# Patient Record
Sex: MALE | Race: WHITE | URBAN - METROPOLITAN AREA
[De-identification: names, ages, dates, MRNs, and addresses within clinical notes are randomized per-mention and may not be internally consistent; named-entity substitution may affect disease eponyms.]

---

## 2017-03-07 ENCOUNTER — TELEPHONE (OUTPATIENT)
Dept: FAMILY MEDICINE CLINIC | Facility: CLINIC | Age: 72
End: 2017-03-07

## 2017-03-07 DIAGNOSIS — G47.33 OSA (OBSTRUCTIVE SLEEP APNEA): Primary | ICD-10-CM

## 2017-03-07 RX ORDER — OMEGA-3S/DHA/EPA/FISH OIL 300-1000MG
1 CAPSULE ORAL 2 TIMES DAILY
COMMUNITY
Start: 2015-02-23

## 2017-03-07 RX ORDER — UBIDECARENONE 60 MG
1 CAPSULE ORAL DAILY
COMMUNITY
Start: 2009-02-26

## 2017-03-07 RX ORDER — OLMESARTAN MEDOXOMIL AND HYDROCHLOROTHIAZIDE 40/12.5 40; 12.5 MG/1; MG/1
1 TABLET ORAL DAILY
COMMUNITY
Start: 2008-03-13 | End: 2017-03-21

## 2017-03-07 RX ORDER — ATORVASTATIN CALCIUM 20 MG/1
1 TABLET, FILM COATED ORAL DAILY
COMMUNITY
Start: 2006-11-24 | End: 2017-03-21

## 2017-03-07 RX ORDER — CLONIDINE HYDROCHLORIDE 0.1 MG/1
1 TABLET ORAL 3 TIMES DAILY
Refills: 0 | COMMUNITY
Start: 2016-12-27 | End: 2018-02-13

## 2017-03-07 RX ORDER — DOCUSATE SODIUM 100 MG/1
1 CAPSULE, LIQUID FILLED ORAL DAILY
COMMUNITY
Start: 2014-03-27 | End: 2020-02-03

## 2017-03-07 RX ORDER — ALLOPURINOL 300 MG/1
1 TABLET ORAL DAILY
COMMUNITY
Start: 2013-02-04 | End: 2017-03-21

## 2017-03-07 RX ORDER — CLOBETASOL PROPIONATE 0.46 MG/ML
SOLUTION TOPICAL
Refills: 0 | COMMUNITY
Start: 2017-01-17

## 2017-03-07 RX ORDER — ACETAMINOPHEN 160 MG
6000 TABLET,DISINTEGRATING ORAL DAILY
COMMUNITY
Start: 2015-01-16

## 2017-03-07 RX ORDER — ALPHA LIPOIC ACID 200 MG
1000 CAPSULE ORAL DAILY
COMMUNITY
Start: 2015-01-16

## 2017-03-07 RX ORDER — CLONIDINE HYDROCHLORIDE 0.1 MG/1
1 TABLET ORAL 3 TIMES DAILY
COMMUNITY
Start: 2006-11-24 | End: 2017-03-21

## 2017-03-07 RX ORDER — MULTIVITAMIN
1 CAPSULE ORAL DAILY
COMMUNITY
Start: 2006-11-24

## 2017-03-07 NOTE — TELEPHONE ENCOUNTER
10/18/16 last seen with Dr. Cipriano Crow for sleep follow up  Okay to fax order to [facility] for cpap supplies?     Selvin Zuniga, 03/07/2017, 4:25 PM

## 2017-03-10 NOTE — TELEPHONE ENCOUNTER
All documentation faxed to [facility], [facility] Minor, 03/10/2017, 4:39 PM

## 2017-03-23 RX ORDER — ALLOPURINOL 300 MG/1
TABLET ORAL
Qty: 90 TABLET | Refills: 0 | Status: SHIPPED | OUTPATIENT
Start: 2017-03-23 | End: 2017-06-22

## 2017-03-23 RX ORDER — ATORVASTATIN CALCIUM 20 MG/1
TABLET, FILM COATED ORAL
Qty: 90 TABLET | Refills: 0 | Status: SHIPPED | OUTPATIENT
Start: 2017-03-23 | End: 2017-06-22

## 2017-03-23 RX ORDER — OLMESARTAN MEDOXOMIL AND HYDROCHLOROTHIAZIDE 40/12.5 40; 12.5 MG/1; MG/1
TABLET ORAL
Qty: 90 TABLET | Refills: 0 | Status: SHIPPED | OUTPATIENT
Start: 2017-03-23 | End: 2017-06-22

## 2017-03-23 RX ORDER — CLONIDINE HYDROCHLORIDE 0.1 MG/1
TABLET ORAL
Qty: 270 TABLET | Refills: 0 | Status: SHIPPED | OUTPATIENT
Start: 2017-03-23 | End: 2017-06-22

## 2017-06-22 RX ORDER — OLMESARTAN MEDOXOMIL AND HYDROCHLOROTHIAZIDE 40/12.5 40; 12.5 MG/1; MG/1
TABLET ORAL
Qty: 90 TABLET | Refills: 0 | Status: SHIPPED | OUTPATIENT
Start: 2017-06-22 | End: 2017-09-25

## 2017-06-22 RX ORDER — ATORVASTATIN CALCIUM 20 MG/1
TABLET, FILM COATED ORAL
Qty: 90 TABLET | Refills: 0 | Status: SHIPPED | OUTPATIENT
Start: 2017-06-22 | End: 2017-09-25

## 2017-06-22 RX ORDER — CLONIDINE HYDROCHLORIDE 0.1 MG/1
TABLET ORAL
Qty: 270 TABLET | Refills: 0 | Status: SHIPPED | OUTPATIENT
Start: 2017-06-22 | End: 2017-09-25

## 2017-06-22 RX ORDER — ALLOPURINOL 300 MG/1
TABLET ORAL
Qty: 90 TABLET | Refills: 0 | Status: SHIPPED | OUTPATIENT
Start: 2017-06-22 | End: 2017-09-25

## 2017-06-22 NOTE — TELEPHONE ENCOUNTER
10/18/16 last OV with Dr. Fernandez Aguayo  1/19/17 last labs done      [facility] Minor, 06/22/2017, 2:31 PM

## 2017-07-12 ENCOUNTER — TELEPHONE (OUTPATIENT)
Dept: FAMILY MEDICINE CLINIC | Facility: CLINIC | Age: 72
End: 2017-07-12

## 2017-07-12 DIAGNOSIS — C61 MALIGNANT NEOPLASM OF PROSTATE (HCC): ICD-10-CM

## 2017-07-12 DIAGNOSIS — R97.20 ELEVATED PROSTATE SPECIFIC ANTIGEN (PSA): Primary | ICD-10-CM

## 2017-07-12 NOTE — TELEPHONE ENCOUNTER
Patient saw Urologist at [facility]. Dr Amrik Alcocer requesting Patient to have repeat PSA. Requesting Order to be entered by Dr Erik Faith. Dr Amrik Alcocer did not send order home with Patient.     He will call back Thursday afternoon to Schedule Lab Bernad[place]

## 2017-07-13 PROBLEM — G47.30 SLEEP APNEA: Status: ACTIVE | Noted: 2017-07-13

## 2017-07-13 PROBLEM — M19.90 OSTEOARTHROSIS: Status: ACTIVE | Noted: 2017-07-13

## 2017-07-13 PROBLEM — Z86.010 HISTORY OF COLONIC POLYPS: Status: ACTIVE | Noted: 2017-07-13

## 2017-07-13 PROBLEM — Z86.0100 HISTORY OF COLONIC POLYPS: Status: ACTIVE | Noted: 2017-07-13

## 2017-07-13 PROBLEM — I10 ESSENTIAL HYPERTENSION: Status: ACTIVE | Noted: 2017-07-13

## 2017-07-13 PROBLEM — E78.00 PURE HYPERCHOLESTEROLEMIA: Status: ACTIVE | Noted: 2017-07-13

## 2017-07-13 PROBLEM — E66.9 OBESITY: Status: ACTIVE | Noted: 2017-07-13

## 2017-07-18 ENCOUNTER — APPOINTMENT (OUTPATIENT)
Dept: LAB | Age: 72
End: 2017-07-18
Attending: FAMILY MEDICINE
Payer: MEDICARE

## 2017-07-18 ENCOUNTER — TELEPHONE (OUTPATIENT)
Dept: FAMILY MEDICINE CLINIC | Facility: CLINIC | Age: 72
End: 2017-07-18

## 2017-07-18 DIAGNOSIS — C61 MALIGNANT NEOPLASM OF PROSTATE (HCC): ICD-10-CM

## 2017-07-18 DIAGNOSIS — R97.20 ELEVATED PROSTATE SPECIFIC ANTIGEN (PSA): ICD-10-CM

## 2017-07-18 LAB — PSA SERPL-MCNC: 3.01 NG/ML (ref 0.01–4)

## 2017-07-18 PROCEDURE — 36415 COLL VENOUS BLD VENIPUNCTURE: CPT

## 2017-07-18 PROCEDURE — 84153 ASSAY OF PSA TOTAL: CPT

## 2017-07-18 NOTE — TELEPHONE ENCOUNTER
----- Message from Luis David MD sent at 7/18/2017  4:18 PM CDT -----  PSA has doubled. Recommend fax to urologist.   Recommend appointment with oncologist as well.

## 2017-07-18 NOTE — TELEPHONE ENCOUNTER
Patient notified of results and recommendations and expressed understanding.   Copy of PSA faxed to Dr. Oli Vora at [facility]  Copy also to  for patient     Tere Zuniga, 07/18/17, 4:56 PM

## 2017-09-25 NOTE — TELEPHONE ENCOUNTER
Future appt:    Last Appointment:  Visit date not found  No results found for: CHOLEST, HDL, LDL, TRIGLY, TRIG  No results found for: EAG, A1C  No results found for: T4F, TSH, TSHT4  No results found for: GLU, BUN, BUNCREA, CREATSERUM, ANIONGAP, GFR, GFRNA

## 2017-09-29 ENCOUNTER — TELEPHONE (OUTPATIENT)
Dept: FAMILY MEDICINE CLINIC | Facility: CLINIC | Age: 72
End: 2017-09-29

## 2017-09-29 DIAGNOSIS — E53.8 ADENOSYLCOBALAMIN SYNTHESIS DEFECT: ICD-10-CM

## 2017-09-29 DIAGNOSIS — C61 MALIGNANT NEOPLASM OF PROSTATE (HCC): ICD-10-CM

## 2017-09-29 DIAGNOSIS — E78.00 PURE HYPERCHOLESTEROLEMIA: Primary | ICD-10-CM

## 2017-09-29 DIAGNOSIS — Z12.5 ENCOUNTER FOR SCREENING FOR MALIGNANT NEOPLASM OF PROSTATE: ICD-10-CM

## 2017-09-29 DIAGNOSIS — E55.9 VITAMIN D DEFICIENCY: ICD-10-CM

## 2017-09-29 DIAGNOSIS — G60.9 HEREDITARY AND IDIOPATHIC PERIPHERAL NEUROPATHY: ICD-10-CM

## 2017-09-29 DIAGNOSIS — R97.20 ELEVATED PROSTATE SPECIFIC ANTIGEN (PSA): ICD-10-CM

## 2017-09-29 DIAGNOSIS — R79.9 ABNORMAL FINDING OF BLOOD CHEMISTRY: ICD-10-CM

## 2017-09-29 RX ORDER — ALLOPURINOL 300 MG/1
TABLET ORAL
Qty: 90 TABLET | Refills: 0 | Status: SHIPPED | OUTPATIENT
Start: 2017-09-29 | End: 2017-12-20

## 2017-09-29 RX ORDER — ATORVASTATIN CALCIUM 20 MG/1
TABLET, FILM COATED ORAL
Qty: 90 TABLET | Refills: 0 | Status: SHIPPED | OUTPATIENT
Start: 2017-09-29 | End: 2017-12-20

## 2017-09-29 RX ORDER — OLMESARTAN MEDOXOMIL AND HYDROCHLOROTHIAZIDE 40/12.5 40; 12.5 MG/1; MG/1
TABLET ORAL
Qty: 90 TABLET | Refills: 0 | Status: SHIPPED | OUTPATIENT
Start: 2017-09-29 | End: 2017-12-20

## 2017-09-29 RX ORDER — CLONIDINE HYDROCHLORIDE 0.1 MG/1
TABLET ORAL
Qty: 270 TABLET | Refills: 0 | Status: SHIPPED | OUTPATIENT
Start: 2017-09-29 | End: 2017-10-17

## 2017-09-29 NOTE — TELEPHONE ENCOUNTER
Future Appointments  Date Time Provider Leeann Collinsi   10/17/2017 11:30 AM Miguel Suarez MD [facility] [place]     Patient is completely out of medications.

## 2017-09-29 NOTE — TELEPHONE ENCOUNTER
Patient would like to have labs done before seeing you at next appt. Future Appointments  Date Time Provider Leeann Flor   10/17/2017 11:30 AM Mihaela Cruz MD [facility] [place]     Can you please advise. Thank you.

## 2017-10-13 ENCOUNTER — LABORATORY ENCOUNTER (OUTPATIENT)
Dept: LAB | Age: 72
End: 2017-10-13
Attending: FAMILY MEDICINE
Payer: MEDICARE

## 2017-10-13 DIAGNOSIS — C61 MALIGNANT NEOPLASM OF PROSTATE (HCC): ICD-10-CM

## 2017-10-13 DIAGNOSIS — G60.9 HEREDITARY AND IDIOPATHIC PERIPHERAL NEUROPATHY: ICD-10-CM

## 2017-10-13 DIAGNOSIS — E53.8 ADENOSYLCOBALAMIN SYNTHESIS DEFECT: ICD-10-CM

## 2017-10-13 DIAGNOSIS — Z12.5 ENCOUNTER FOR SCREENING FOR MALIGNANT NEOPLASM OF PROSTATE: ICD-10-CM

## 2017-10-13 DIAGNOSIS — R79.9 ABNORMAL FINDING OF BLOOD CHEMISTRY: ICD-10-CM

## 2017-10-13 DIAGNOSIS — E55.9 VITAMIN D DEFICIENCY: ICD-10-CM

## 2017-10-13 DIAGNOSIS — E78.00 PURE HYPERCHOLESTEROLEMIA: ICD-10-CM

## 2017-10-13 DIAGNOSIS — R97.20 ELEVATED PSA: ICD-10-CM

## 2017-10-13 PROCEDURE — 36415 COLL VENOUS BLD VENIPUNCTURE: CPT

## 2017-10-13 PROCEDURE — 82553 CREATINE MB FRACTION: CPT

## 2017-10-13 PROCEDURE — 80053 COMPREHEN METABOLIC PANEL: CPT

## 2017-10-13 PROCEDURE — 81003 URINALYSIS AUTO W/O SCOPE: CPT

## 2017-10-13 PROCEDURE — 82728 ASSAY OF FERRITIN: CPT

## 2017-10-13 PROCEDURE — 84443 ASSAY THYROID STIM HORMONE: CPT

## 2017-10-13 PROCEDURE — 83036 HEMOGLOBIN GLYCOSYLATED A1C: CPT

## 2017-10-13 PROCEDURE — 85025 COMPLETE CBC W/AUTO DIFF WBC: CPT

## 2017-10-13 PROCEDURE — 84550 ASSAY OF BLOOD/URIC ACID: CPT

## 2017-10-13 PROCEDURE — 84153 ASSAY OF PSA TOTAL: CPT

## 2017-10-13 PROCEDURE — 82306 VITAMIN D 25 HYDROXY: CPT

## 2017-10-13 PROCEDURE — 83540 ASSAY OF IRON: CPT

## 2017-10-13 PROCEDURE — 82607 VITAMIN B-12: CPT

## 2017-10-13 PROCEDURE — 80061 LIPID PANEL: CPT

## 2017-10-13 PROCEDURE — 82550 ASSAY OF CK (CPK): CPT

## 2017-10-13 PROCEDURE — 83550 IRON BINDING TEST: CPT

## 2017-10-16 ENCOUNTER — TELEPHONE (OUTPATIENT)
Dept: FAMILY MEDICINE CLINIC | Facility: CLINIC | Age: 72
End: 2017-10-16

## 2017-10-16 NOTE — TELEPHONE ENCOUNTER
Patient notified of results and recommendations and expressed understanding.     Your appointments     Date & Time Appointment Department [facility])    Oct 17, 2017 11:30 AM CDT Follow up with Manisha Santamaria MD [address] [place] [address] (Ed

## 2017-10-16 NOTE — TELEPHONE ENCOUNTER
----- Message from Demetri Carranza MD sent at 10/16/2017  9:33 AM CDT -----  A1c is slightly elevated continue to watch diet. CK is elevated as patient having musculoskeletal pain?   Triglycerides are elevated with low HDLs and fish oil twice daily

## 2017-10-17 ENCOUNTER — OFFICE VISIT (OUTPATIENT)
Dept: FAMILY MEDICINE CLINIC | Facility: CLINIC | Age: 72
End: 2017-10-17

## 2017-10-17 ENCOUNTER — HOSPITAL ENCOUNTER (OUTPATIENT)
Dept: GENERAL RADIOLOGY | Age: 72
Discharge: HOME | End: 2017-10-17
Attending: FAMILY MEDICINE
Payer: MEDICARE

## 2017-10-17 VITALS
SYSTOLIC BLOOD PRESSURE: 138 MMHG | HEIGHT: 72 IN | HEART RATE: 72 BPM | BODY MASS INDEX: 38.38 KG/M2 | WEIGHT: 283.38 LBS | DIASTOLIC BLOOD PRESSURE: 70 MMHG | TEMPERATURE: 98 F | RESPIRATION RATE: 18 BRPM

## 2017-10-17 DIAGNOSIS — E55.9 VITAMIN D DEFICIENCY: ICD-10-CM

## 2017-10-17 DIAGNOSIS — C61 MALIGNANT NEOPLASM OF PROSTATE (HCC): ICD-10-CM

## 2017-10-17 DIAGNOSIS — E78.00 PURE HYPERCHOLESTEROLEMIA: ICD-10-CM

## 2017-10-17 DIAGNOSIS — M54.6 ACUTE MIDLINE THORACIC BACK PAIN: ICD-10-CM

## 2017-10-17 DIAGNOSIS — I10 ESSENTIAL HYPERTENSION: Primary | ICD-10-CM

## 2017-10-17 PROCEDURE — 99214 OFFICE O/P EST MOD 30 MIN: CPT | Performed by: FAMILY MEDICINE

## 2017-10-17 PROCEDURE — 90653 IIV ADJUVANT VACCINE IM: CPT | Performed by: FAMILY MEDICINE

## 2017-10-17 PROCEDURE — 72072 X-RAY EXAM THORAC SPINE 3VWS: CPT | Performed by: FAMILY MEDICINE

## 2017-10-17 PROCEDURE — G0008 ADMIN INFLUENZA VIRUS VAC: HCPCS | Performed by: FAMILY MEDICINE

## 2017-10-17 NOTE — PROGRESS NOTES
[facility] [place]  PROGRESS NOTE        HPI:   This is a 67year old male coming in for Patient presents with:  Medication Follow-Up: med check and go over lab results  Imm/Inj: flu shot    HPI  Here for follow-up of his chronic medical problem 8.7 mg/dL   -URINALYSIS WITH CULTURE REFLEX   Result Value Ref Range   Urine Color Yellow Yellow   Clarity Urine Clear Clear   Spec Gravity 1.016 1.001 - 1.030   Glucose Urine Negative Negative mg/dl   Bilirubin Urine Negative Negative   Ketones Urine Nega 0.00 - 1.00 x10(3) uL   Neutrophil % 57.0 %   Lymphocyte % 26.0 %   Monocyte % 8.4 %   Eosinophil % 7.6 %   Basophil % 0.8 %   Immature Granulocyte % 0.2 %       Past Medical History:   Diagnosis Date   • Essential hypertension      Past Surgical History: Not Answered         Problem List:  Patient Active Problem List:     First degree atrioventricular block     Benign enlargement of prostate     Eczema     Elevated prostate specific antigen (PSA)     Hemorrhoids     History of colonic polyps     Pure hyper and time. He has normal reflexes. Skin: Skin is warm and dry. ASSESSMENT AND PLAN:   Ron Calero was seen today for medication follow-up and imm/inj. Diagnoses and all orders for this visit:    Essential hypertension  Well-controlled.   Continue presen 11/04/2014      Pneumovax 23          12/01/2010      TDAP                  03/02/2009

## 2017-11-20 ENCOUNTER — TELEPHONE (OUTPATIENT)
Dept: FAMILY MEDICINE CLINIC | Facility: CLINIC | Age: 72
End: 2017-11-20

## 2017-11-20 DIAGNOSIS — C61 ADENOCARCINOMA OF PROSTATE (HCC): ICD-10-CM

## 2017-11-20 DIAGNOSIS — R73.9 HYPERGLYCEMIA: ICD-10-CM

## 2017-11-20 DIAGNOSIS — R89.9 ABNORMAL LABORATORY TEST: ICD-10-CM

## 2017-11-20 DIAGNOSIS — R97.20 ELEVATED PROSTATE SPECIFIC ANTIGEN (PSA): Primary | ICD-10-CM

## 2017-11-20 NOTE — TELEPHONE ENCOUNTER
Patient has upcoming physical and lab appt. Needs orders entered.  Please advise     Your appointments     Date & Time Appointment Department [facility])    Jan 17, 2018  9:15 AM CST Laboratory Visit with REF [facility] ([facility] [place]

## 2017-12-20 RX ORDER — ATORVASTATIN CALCIUM 20 MG/1
TABLET, FILM COATED ORAL
Qty: 90 TABLET | Refills: 0 | Status: SHIPPED | OUTPATIENT
Start: 2017-12-20 | End: 2018-03-19

## 2017-12-20 RX ORDER — ALLOPURINOL 300 MG/1
TABLET ORAL
Qty: 90 TABLET | Refills: 0 | Status: SHIPPED | OUTPATIENT
Start: 2017-12-20 | End: 2018-03-19

## 2017-12-20 RX ORDER — CLONIDINE HYDROCHLORIDE 0.1 MG/1
TABLET ORAL
Qty: 270 TABLET | Refills: 0 | Status: SHIPPED | OUTPATIENT
Start: 2017-12-20 | End: 2018-03-19

## 2017-12-20 RX ORDER — OLMESARTAN MEDOXOMIL AND HYDROCHLOROTHIAZIDE 40/12.5 40; 12.5 MG/1; MG/1
TABLET ORAL
Qty: 90 TABLET | Refills: 0 | Status: SHIPPED | OUTPATIENT
Start: 2017-12-20 | End: 2018-03-19

## 2017-12-20 NOTE — TELEPHONE ENCOUNTER
Future appt:     Your appointments     Date & Time Appointment Department [facility])    Jan 17, 2018  9:15 AM CST Laboratory Visit with REF [facility]  Reference Lab ([facility] [place])    Jan 18, 2018 11:00 AM CST Medicare Annual Well Visit with Mock,

## 2018-01-17 ENCOUNTER — LABORATORY ENCOUNTER (OUTPATIENT)
Dept: LAB | Age: 73
End: 2018-01-17
Attending: FAMILY MEDICINE
Payer: MEDICARE

## 2018-01-17 ENCOUNTER — TELEPHONE (OUTPATIENT)
Dept: FAMILY MEDICINE CLINIC | Facility: CLINIC | Age: 73
End: 2018-01-17

## 2018-01-17 DIAGNOSIS — R89.9 ABNORMAL LABORATORY TEST: ICD-10-CM

## 2018-01-17 DIAGNOSIS — C61 ADENOCARCINOMA OF PROSTATE (HCC): ICD-10-CM

## 2018-01-17 DIAGNOSIS — R73.9 HYPERGLYCEMIA: ICD-10-CM

## 2018-01-17 DIAGNOSIS — R97.20 ELEVATED PROSTATE SPECIFIC ANTIGEN (PSA): ICD-10-CM

## 2018-01-17 LAB
ALBUMIN SERPL-MCNC: 4 G/DL (ref 3.5–4.8)
ALP LIVER SERPL-CCNC: 90 U/L (ref 45–117)
ALT SERPL-CCNC: 42 U/L (ref 17–63)
AST SERPL-CCNC: 23 U/L (ref 15–41)
BASOPHILS # BLD AUTO: 0.05 X10(3) UL (ref 0–0.1)
BASOPHILS NFR BLD AUTO: 0.7 %
BILIRUB SERPL-MCNC: 0.8 MG/DL (ref 0.1–2)
BUN BLD-MCNC: 16 MG/DL (ref 8–20)
CALCIUM BLD-MCNC: 9.2 MG/DL (ref 8.3–10.3)
CHLORIDE: 108 MMOL/L (ref 101–111)
CK: 152 IU/L (ref 39–308)
CO2: 28 MMOL/L (ref 22–32)
COMPLEXED PSA SERPL-MCNC: 5.05 NG/ML (ref 0.01–4)
CREAT BLD-MCNC: 0.98 MG/DL (ref 0.7–1.3)
EOSINOPHIL # BLD AUTO: 0.41 X10(3) UL (ref 0–0.3)
EOSINOPHIL NFR BLD AUTO: 5.4 %
ERYTHROCYTE [DISTWIDTH] IN BLOOD BY AUTOMATED COUNT: 13.8 % (ref 11.5–16)
EST. AVERAGE GLUCOSE BLD GHB EST-MCNC: 131 MG/DL (ref 68–126)
GLUCOSE BLD-MCNC: 87 MG/DL (ref 70–99)
HBA1C MFR BLD HPLC: 6.2 % (ref ?–5.7)
HCT VFR BLD AUTO: 44.2 % (ref 37–53)
HGB BLD-MCNC: 14.6 G/DL (ref 13–17)
IMMATURE GRANULOCYTE COUNT: 0.03 X10(3) UL (ref 0–1)
IMMATURE GRANULOCYTE RATIO %: 0.4 %
LYMPHOCYTES # BLD AUTO: 2.12 X10(3) UL (ref 0.9–4)
LYMPHOCYTES NFR BLD AUTO: 27.7 %
M PROTEIN MFR SERPL ELPH: 7.4 G/DL (ref 6.1–8.3)
MCH RBC QN AUTO: 30.9 PG (ref 27–33.2)
MCHC RBC AUTO-ENTMCNC: 33 G/DL (ref 31–37)
MCV RBC AUTO: 93.4 FL (ref 80–99)
MONOCYTES # BLD AUTO: 0.65 X10(3) UL (ref 0.1–0.6)
MONOCYTES NFR BLD AUTO: 8.5 %
NEUTROPHIL ABS PRELIM: 4.39 X10 (3) UL (ref 1.3–6.7)
NEUTROPHILS # BLD AUTO: 4.39 X10(3) UL (ref 1.3–6.7)
NEUTROPHILS NFR BLD AUTO: 57.3 %
PLATELET # BLD AUTO: 200 10(3)UL (ref 150–450)
POTASSIUM SERPL-SCNC: 3.6 MMOL/L (ref 3.6–5.1)
RBC # BLD AUTO: 4.73 X10(6)UL (ref 3.8–5.8)
RED CELL DISTRIBUTION WIDTH-SD: 47.2 FL (ref 35.1–46.3)
SODIUM SERPL-SCNC: 143 MMOL/L (ref 136–144)
WBC # BLD AUTO: 7.7 X10(3) UL (ref 4–13)

## 2018-01-17 PROCEDURE — 80053 COMPREHEN METABOLIC PANEL: CPT

## 2018-01-17 PROCEDURE — 83036 HEMOGLOBIN GLYCOSYLATED A1C: CPT

## 2018-01-17 PROCEDURE — 82550 ASSAY OF CK (CPK): CPT

## 2018-01-17 PROCEDURE — 36415 COLL VENOUS BLD VENIPUNCTURE: CPT

## 2018-01-17 PROCEDURE — 85025 COMPLETE CBC W/AUTO DIFF WBC: CPT

## 2018-01-17 NOTE — TELEPHONE ENCOUNTER
patient is here for labs today and would like to access results on my chart when results are in- since dr lim is not in to sign off on these to put them through to Malia- will someone else be able to do this so that they are available to view online?- h

## 2018-01-17 NOTE — TELEPHONE ENCOUNTER
These let patient know that when I review the results I will be able to release him to my chart for him. Thank you.  Loreta Franco, 01/17/18, 9:29 AM

## 2018-01-23 ENCOUNTER — TELEPHONE (OUTPATIENT)
Dept: FAMILY MEDICINE CLINIC | Facility: CLINIC | Age: 73
End: 2018-01-23

## 2018-01-23 DIAGNOSIS — R97.20 ELEVATED PSA: Primary | ICD-10-CM

## 2018-01-23 DIAGNOSIS — C61 MALIGNANT NEOPLASM OF PROSTATE (HCC): ICD-10-CM

## 2018-01-23 DIAGNOSIS — Z92.3 PERSONAL HISTORY OF RADIATION EXPOSURE: ICD-10-CM

## 2018-01-23 NOTE — TELEPHONE ENCOUNTER
Per Nuc Med at [facility], just an order for bone scan is needed. Also needs to specify body part or whole body.

## 2018-01-23 NOTE — TELEPHONE ENCOUNTER
Orders entered. Please fax orders with the recent CMP to [facility] and let patient know. Thank you.    Loreta Franco, 01/23/18, 1:58 PM

## 2018-01-23 NOTE — TELEPHONE ENCOUNTER
Faxed orders and CMP to [facility] patient scheduling. Phoned patient and informed him of orders faxed. Gave patient [facility] patient scheduling phone number. Patient verbalized understanding and expressed appreciation. Thank you.  TRENT NORIEGA, 01/23/18, 2:23 P

## 2018-01-23 NOTE — TELEPHONE ENCOUNTER
Dr. Edgar Cummings (Oncologist in [place]) calling regarding patient. Dr. Martin Neville states he would like to have a bone scan and a CT of abdomin and pelvis because of an elevated PSA and previous radiation ordered for patient.  Dr. Martin Neville aware Dr. Lilian Aceves

## 2018-02-05 ENCOUNTER — PATIENT MESSAGE (OUTPATIENT)
Dept: FAMILY MEDICINE CLINIC | Facility: CLINIC | Age: 73
End: 2018-02-05

## 2018-02-05 NOTE — TELEPHONE ENCOUNTER
From: Harpreet Broussard  To: Lex Pop MD  Sent: 2/5/2018 2:59 PM CST  Subject: Test Results Question    Last week I had a bone scan and CT scan conducted at [facility]. I was told the test results would be put into My Chart test results file.  My

## 2018-02-07 ENCOUNTER — TELEPHONE (OUTPATIENT)
Dept: FAMILY MEDICINE CLINIC | Facility: CLINIC | Age: 73
End: 2018-02-07

## 2018-02-07 NOTE — TELEPHONE ENCOUNTER
Irma calling to make sure we received the addendum to patient's CT scan. States she faxed it over this morning. Elysia Freire checked the faxes while I was on the phone with Irma and we did receive the fax. Irma informed.   Will route message to [facility]

## 2018-02-08 ENCOUNTER — TELEPHONE (OUTPATIENT)
Dept: FAMILY MEDICINE CLINIC | Facility: CLINIC | Age: 73
End: 2018-02-08

## 2018-02-08 NOTE — TELEPHONE ENCOUNTER
Call to Dr. Harinder Aldrich regarding abnormal CT scan. Due to elevated PSA. he was with a patient.    Will call back

## 2018-02-08 NOTE — TELEPHONE ENCOUNTER
Spoke with Dr. Millie Hoang local urology appointment to further evaluate lesion on kidney and will need follow up with him, for possbie axemet scanning. Had a bone scan as well.    Wife is in [place] and goes out there the 19th of Febr

## 2018-02-10 ENCOUNTER — PATIENT MESSAGE (OUTPATIENT)
Dept: FAMILY MEDICINE CLINIC | Facility: CLINIC | Age: 73
End: 2018-02-10

## 2018-02-10 DIAGNOSIS — R97.20 ELEVATED PSA: ICD-10-CM

## 2018-02-10 DIAGNOSIS — C61 PROSTATE CA (HCC): ICD-10-CM

## 2018-02-10 DIAGNOSIS — N28.89 RENAL MASS: Primary | ICD-10-CM

## 2018-02-10 NOTE — TELEPHONE ENCOUNTER
From: Fede Dave  To: Manisha Santamaria MD  Sent: 2/10/2018 9:05 AM CST  Subject: Referral Request    The other day you asked if I have a preference about a urologist. I had forgotten that my daughter and grandson had to to Dr. Beth Brown in [place] and taryn

## 2018-02-12 ENCOUNTER — TELEPHONE (OUTPATIENT)
Dept: FAMILY MEDICINE CLINIC | Facility: CLINIC | Age: 73
End: 2018-02-12

## 2018-02-12 NOTE — TELEPHONE ENCOUNTER
Please f-ax copies of his ct, bone scan and demographic to Dr. Whelan Stagers office at  [phone]

## 2018-02-13 ENCOUNTER — OFFICE VISIT (OUTPATIENT)
Dept: FAMILY MEDICINE CLINIC | Facility: CLINIC | Age: 73
End: 2018-02-13

## 2018-02-13 VITALS
HEIGHT: 72 IN | DIASTOLIC BLOOD PRESSURE: 88 MMHG | WEIGHT: 282.81 LBS | HEART RATE: 76 BPM | BODY MASS INDEX: 38.31 KG/M2 | RESPIRATION RATE: 16 BRPM | TEMPERATURE: 98 F | SYSTOLIC BLOOD PRESSURE: 146 MMHG

## 2018-02-13 DIAGNOSIS — Z13.39 SCREENING FOR ALCOHOL PROBLEM: ICD-10-CM

## 2018-02-13 DIAGNOSIS — C61 MALIGNANT NEOPLASM OF PROSTATE (HCC): ICD-10-CM

## 2018-02-13 DIAGNOSIS — Z13.31 DEPRESSION SCREENING: ICD-10-CM

## 2018-02-13 DIAGNOSIS — I10 ESSENTIAL HYPERTENSION: ICD-10-CM

## 2018-02-13 DIAGNOSIS — E78.00 PURE HYPERCHOLESTEROLEMIA: ICD-10-CM

## 2018-02-13 DIAGNOSIS — Z00.00 ENCOUNTER FOR ANNUAL HEALTH EXAMINATION: Primary | ICD-10-CM

## 2018-02-13 DIAGNOSIS — E53.8 ADENOSYLCOBALAMIN SYNTHESIS DEFECT: ICD-10-CM

## 2018-02-13 PROCEDURE — G0442 ANNUAL ALCOHOL SCREEN 15 MIN: HCPCS | Performed by: FAMILY MEDICINE

## 2018-02-13 PROCEDURE — G0444 DEPRESSION SCREEN ANNUAL: HCPCS | Performed by: FAMILY MEDICINE

## 2018-02-13 PROCEDURE — G0439 PPPS, SUBSEQ VISIT: HCPCS | Performed by: FAMILY MEDICINE

## 2018-02-13 PROCEDURE — 93000 ELECTROCARDIOGRAM COMPLETE: CPT | Performed by: FAMILY MEDICINE

## 2018-02-13 RX ORDER — ALCLOMETASONE DIPROPIONATE 0.5 MG/G
OINTMENT TOPICAL
COMMUNITY
Start: 2018-01-04

## 2018-02-13 NOTE — PATIENT INSTRUCTIONS
Ranjeet Damon's SCREENING SCHEDULE   Tests on this list are recommended by your physician but may not be covered, or covered at this frequency, by your insurer. Please check with your insurance carrier before scheduling to verify coverage.     PREVENTATIV 10 years- more often if abnormal Colonoscopy,3 Years due on 04/08/2018 Update Health Maintenance if applicable    Flex Sigmoidoscopy Screen  Covered every 5 years No results found for this or any previous visit. No flowsheet data found.      Fecal Occult Bl cover unless Medically needed    Zoster (Not covered by Medicare Part B) No orders found for this or any previous visit. This may be covered with your pharmacy  prescription benefits     Recommended Websites for Advanced Directives    [email or web address]

## 2018-02-13 NOTE — PROGRESS NOTES
HPI:   Sarah De Guzman is a 67year old male who presents for a Medicare Subsequent Annual Wellness visit (Pt already had Initial Annual Wellness).     His last annual assessment has been over 1 year: Annual Physical due on 01/15/2016         Fall/Risk Asse CAGE Alcohol screening   Oskar Stroud was screened for Alcohol abuse and had a score of 0 so is at low risk.      Patient Care Team: Patient Care Team:  Dottie Diaz MD as PCP - General (Family Practice)    Patient Active Problem List:     First de by mouth daily. Vitamin D3 2000 units Oral Cap Take 3,000 Units by mouth daily. Coenzyme Q10 (CO Q 10) 60 MG Oral Cap Take 1 capsule by mouth 2 (two) times daily. docusate sodium (COLACE) 100 MG Oral Cap Take 1 capsule by mouth daily.    Multiple Tiffanie allergy or asthma    EXAM:   /88 (BP Location: Right arm, Patient Position: Sitting, Cuff Size: large)   Pulse 76   Temp 97.9 °F (36.6 °C) (Temporal)   Resp 16   Ht 72\"   Wt 282 lb 12.8 oz   BMI 38.35 kg/m²   Estimated body mass index is 38.35 kg/m² 23 12/01/2010   • TDAP 03/02/2009        ASSESSMENT AND OTHER RELEVANT CHRONIC CONDITIONS:   Valerie Turcios is a 67year old male who presents for a Medicare Assessment.      PLAN SUMMARY:   Diagnoses and all orders for this visit:    Encounter for annual he Value   01/17/2018 87   ----------       Cardiovascular Disease Screening     LDL Annually LDL Cholesterol (mg/dL)   Date Value   10/13/2017 64        EKG - w/ Initial Preventative Physical Exam only, or if medically necessary Electrocardiogram date    Col Procedure      Annual Monitoring of Persistent     Medications (ACE/ARB, digoxin diuretics, anticonvulsants.)    Potassium  Annually Potassium (mmol/L)   Date Value   01/17/2018 3.6    No flowsheet data found.     Creatinine  Annually Creatinine (mg/dL)   D

## 2018-03-09 ENCOUNTER — TELEPHONE (OUTPATIENT)
Dept: FAMILY MEDICINE CLINIC | Facility: CLINIC | Age: 73
End: 2018-03-09

## 2018-03-19 RX ORDER — CLONIDINE HYDROCHLORIDE 0.1 MG/1
TABLET ORAL
Qty: 270 TABLET | Refills: 1 | Status: SHIPPED | OUTPATIENT
Start: 2018-03-19 | End: 2018-09-21

## 2018-03-19 RX ORDER — ALLOPURINOL 300 MG/1
TABLET ORAL
Qty: 90 TABLET | Refills: 1 | Status: SHIPPED | OUTPATIENT
Start: 2018-03-19 | End: 2018-09-21

## 2018-03-19 RX ORDER — ATORVASTATIN CALCIUM 20 MG/1
TABLET, FILM COATED ORAL
Qty: 90 TABLET | Refills: 1 | Status: SHIPPED | OUTPATIENT
Start: 2018-03-19 | End: 2018-09-21

## 2018-03-19 RX ORDER — OLMESARTAN MEDOXOMIL AND HYDROCHLOROTHIAZIDE 40/12.5 40; 12.5 MG/1; MG/1
TABLET ORAL
Qty: 90 TABLET | Refills: 1 | Status: SHIPPED | OUTPATIENT
Start: 2018-03-19 | End: 2018-09-21

## 2018-03-19 NOTE — TELEPHONE ENCOUNTER
Future appt:     Your appointments     Date & Time Appointment Department [facility])    Jul 23, 2018  4:40 PM CDT Exam - Established Patient with Portia Gray MD [address] [place], [place] [address]        [place]

## 2018-08-31 ENCOUNTER — OFFICE VISIT (OUTPATIENT)
Dept: FAMILY MEDICINE CLINIC | Facility: CLINIC | Age: 73
End: 2018-08-31
Payer: MEDICARE

## 2018-08-31 VITALS
BODY MASS INDEX: 38.44 KG/M2 | TEMPERATURE: 99 F | RESPIRATION RATE: 20 BRPM | WEIGHT: 283.81 LBS | HEIGHT: 72 IN | HEART RATE: 78 BPM | DIASTOLIC BLOOD PRESSURE: 76 MMHG | SYSTOLIC BLOOD PRESSURE: 136 MMHG

## 2018-08-31 DIAGNOSIS — R73.09 ABNORMAL GLUCOSE: ICD-10-CM

## 2018-08-31 DIAGNOSIS — I44.0 FIRST DEGREE ATRIOVENTRICULAR BLOCK: ICD-10-CM

## 2018-08-31 DIAGNOSIS — C61 MALIGNANT NEOPLASM OF PROSTATE (HCC): ICD-10-CM

## 2018-08-31 DIAGNOSIS — I10 ESSENTIAL HYPERTENSION: ICD-10-CM

## 2018-08-31 DIAGNOSIS — E55.9 VITAMIN D DEFICIENCY: ICD-10-CM

## 2018-08-31 DIAGNOSIS — R01.1 MURMUR, CARDIAC: Primary | ICD-10-CM

## 2018-08-31 PROCEDURE — 99214 OFFICE O/P EST MOD 30 MIN: CPT | Performed by: FAMILY MEDICINE

## 2018-08-31 NOTE — PROGRESS NOTES
[facility] [place]  PROGRESS NOTE        HPI:   This is a 68year old male coming in for Patient presents with:  Medication Follow-Up    HPI  Just finished 39 radiation treatments for his prostrate cancer and doing well.   He  Notes that he has b RDW-SD 47.2 (H) 35.1 - 46.3 fL   Neutrophil Absolute Prelim 4.39 1.30 - 6.70 x10 (3) uL   Neutrophil Absolute 4.39 1.30 - 6.70 x10(3) uL   Lymphocyte Absolute 2.12 0.90 - 4.00 x10(3) uL   Monocyte Absolute 0.65 (H) 0.10 - 0.60 x10(3) uL   Eosinophil Abso Packs/day: 1.00      Years: 35.00        Types: Cigarettes, Pipe, Cigars     Quit date: 1/1/2000  Smokeless tobacco: Never Used                      Comment: 20yrs cigarettes, 9 yrs pipe, 6 years cigars            [place]  Alcohol use:  Yes by mouth daily. Disp:  Rfl:    Omega-3 Fatty Acids (OMEGA-3 FISH OIL) 300 MG Oral Cap Take 1 capsule by mouth 2 (two) times daily.  Disp:  Rfl:    Clobetasol Propionate 0.05 % External Solution AAA prn Disp:  Rfl: 0   aspirin 81 MG Oral Tab Take 81 mg by mo Constitutional: He is oriented to person, place, and time. He appears well-developed and well-nourished. HENT:   Head: Normocephalic and atraumatic.    Right Ear: External ear normal.   Left Ear: External ear normal.   Mouth/Throat: Oropharynx is clear continue present management plan to follow-up in 6 months and repeat these labs in 1-2 months. Malignant neoplasm of prostate ([facility] [address]  -     CBC WITH DIFFERENTIAL WITH PLATELET; Future  -     COMP METABOLIC PANEL (14);  Future  -     CK CREATINE KINASE (NOT 10/17/2017   • Meningococcal (Menactra/Menveo) 06/12/2002   • Pneumococcal (Prevnar 13) 11/04/2014   • Pneumovax 23 12/01/2010   • TDAP 03/02/2009   • Typhoid 04/15/1999, 06/12/2002   • Yellow Fever 04/15/1999   • Zoster Vaccine Live (Zostavax) 02/27/2009

## 2018-09-21 RX ORDER — CLONIDINE HYDROCHLORIDE 0.1 MG/1
TABLET ORAL
Qty: 270 TABLET | Refills: 1 | Status: SHIPPED | OUTPATIENT
Start: 2018-09-21 | End: 2019-03-13

## 2018-09-21 RX ORDER — ATORVASTATIN CALCIUM 20 MG/1
TABLET, FILM COATED ORAL
Qty: 90 TABLET | Refills: 1 | Status: SHIPPED | OUTPATIENT
Start: 2018-09-21 | End: 2019-03-13

## 2018-09-21 RX ORDER — ALLOPURINOL 300 MG/1
TABLET ORAL
Qty: 90 TABLET | Refills: 1 | Status: SHIPPED | OUTPATIENT
Start: 2018-09-21 | End: 2019-03-13

## 2018-09-21 RX ORDER — OLMESARTAN MEDOXOMIL AND HYDROCHLOROTHIAZIDE 40/12.5 40; 12.5 MG/1; MG/1
TABLET ORAL
Qty: 90 TABLET | Refills: 1 | Status: SHIPPED | OUTPATIENT
Start: 2018-09-21 | End: 2019-03-13

## 2018-09-21 NOTE — TELEPHONE ENCOUNTER
Future appt:     Your appointments     Date & Time Appointment Department [facility])    Oct 15, 2018  8:00 AM CDT Laboratory Visit with REF Tamika Jett [facility] ([facility])        [facility]  [facility] [place]  [address] [place] [address]

## 2018-09-28 ENCOUNTER — TELEPHONE (OUTPATIENT)
Dept: FAMILY MEDICINE CLINIC | Facility: CLINIC | Age: 73
End: 2018-09-28

## 2018-09-28 NOTE — TELEPHONE ENCOUNTER
Patient notified of results and recommendations and expressed understanding.     Bhavesh Cotto, 09/28/18, 5:58 PM

## 2018-09-28 NOTE — TELEPHONE ENCOUNTER
Called to tell patient about his echo result. Pt with very mild disease.  Ef of 70  aortic valve sclerosis,

## 2018-10-15 ENCOUNTER — LABORATORY ENCOUNTER (OUTPATIENT)
Dept: LAB | Age: 73
End: 2018-10-15
Attending: FAMILY MEDICINE
Payer: MEDICARE

## 2018-10-15 DIAGNOSIS — I10 ESSENTIAL HYPERTENSION: ICD-10-CM

## 2018-10-15 DIAGNOSIS — R73.09 ABNORMAL GLUCOSE: ICD-10-CM

## 2018-10-15 DIAGNOSIS — E55.9 VITAMIN D DEFICIENCY: ICD-10-CM

## 2018-10-15 DIAGNOSIS — C61 MALIGNANT NEOPLASM OF PROSTATE (HCC): ICD-10-CM

## 2018-10-15 PROCEDURE — 82306 VITAMIN D 25 HYDROXY: CPT

## 2018-10-15 PROCEDURE — 84153 ASSAY OF PSA TOTAL: CPT

## 2018-10-15 PROCEDURE — 85025 COMPLETE CBC W/AUTO DIFF WBC: CPT

## 2018-10-15 PROCEDURE — 84443 ASSAY THYROID STIM HORMONE: CPT

## 2018-10-15 PROCEDURE — 86038 ANTINUCLEAR ANTIBODIES: CPT

## 2018-10-15 PROCEDURE — 81003 URINALYSIS AUTO W/O SCOPE: CPT

## 2018-10-15 PROCEDURE — 82607 VITAMIN B-12: CPT

## 2018-10-15 PROCEDURE — 36415 COLL VENOUS BLD VENIPUNCTURE: CPT

## 2018-10-15 PROCEDURE — 82550 ASSAY OF CK (CPK): CPT

## 2018-10-15 PROCEDURE — 85652 RBC SED RATE AUTOMATED: CPT

## 2018-10-15 PROCEDURE — 84550 ASSAY OF BLOOD/URIC ACID: CPT

## 2018-10-15 PROCEDURE — 86200 CCP ANTIBODY: CPT

## 2018-10-15 PROCEDURE — 83036 HEMOGLOBIN GLYCOSYLATED A1C: CPT

## 2018-10-15 PROCEDURE — 86431 RHEUMATOID FACTOR QUANT: CPT

## 2018-10-15 PROCEDURE — 80061 LIPID PANEL: CPT

## 2018-10-15 PROCEDURE — 80053 COMPREHEN METABOLIC PANEL: CPT

## 2019-01-04 DIAGNOSIS — C61 MALIGNANT NEOPLASM OF PROSTATE (HCC): ICD-10-CM

## 2019-01-04 DIAGNOSIS — R97.20 ELEVATED PROSTATE SPECIFIC ANTIGEN (PSA): Primary | ICD-10-CM

## 2019-01-04 NOTE — PROGRESS NOTES
Order for PSA into chart per Dr. Umberto Sneed orders  See patient letter scanned into Epic dated 11/12/18    Amber Cherry, 01/04/19, 9:52 AM

## 2019-01-15 ENCOUNTER — TELEPHONE (OUTPATIENT)
Dept: FAMILY MEDICINE CLINIC | Facility: CLINIC | Age: 74
End: 2019-01-15

## 2019-01-24 ENCOUNTER — APPOINTMENT (OUTPATIENT)
Dept: LAB | Age: 74
End: 2019-01-24
Attending: FAMILY MEDICINE
Payer: MEDICARE

## 2019-01-24 DIAGNOSIS — C61 MALIGNANT NEOPLASM OF PROSTATE (HCC): ICD-10-CM

## 2019-01-24 DIAGNOSIS — R97.20 ELEVATED PROSTATE SPECIFIC ANTIGEN (PSA): ICD-10-CM

## 2019-01-24 LAB — PSA SERPL-MCNC: 0.01 NG/ML (ref 0.01–4)

## 2019-01-24 PROCEDURE — 36415 COLL VENOUS BLD VENIPUNCTURE: CPT

## 2019-01-24 PROCEDURE — 84153 ASSAY OF PSA TOTAL: CPT

## 2019-01-25 ENCOUNTER — TELEPHONE (OUTPATIENT)
Dept: FAMILY MEDICINE CLINIC | Facility: CLINIC | Age: 74
End: 2019-01-25

## 2019-01-25 NOTE — TELEPHONE ENCOUNTER
Patient informed of below. patient not seeing a urologist at this time. Patient will print results off [employer] and bring with to his oncology appt. patient extremely pleased with his labs and excited for dilia in march.

## 2019-01-25 NOTE — TELEPHONE ENCOUNTER
----- Message from Loli Iglesias MD sent at 1/24/2019  5:07 PM CST -----  Please send to urology. Looks good.

## 2019-02-11 ENCOUNTER — TELEPHONE (OUTPATIENT)
Dept: FAMILY MEDICINE CLINIC | Facility: CLINIC | Age: 74
End: 2019-02-11

## 2019-02-11 DIAGNOSIS — G47.33 OSA (OBSTRUCTIVE SLEEP APNEA): Primary | ICD-10-CM

## 2019-02-11 NOTE — TELEPHONE ENCOUNTER
Patient states his cpap machine is not working correctly   [employer] is going to replace his machine  States he needs an order faxed to [facility] so his cpap machine can be replaced    Valeria Hobbs, 02/11/19, 11:47 AM

## 2019-02-11 NOTE — TELEPHONE ENCOUNTER
Patient having problems with sleep machine, patient states that justin needs a prescription from Dr. Carleen Acevedo so they can replace patients machine. Any questions, call patient.

## 2019-03-04 ENCOUNTER — OFFICE VISIT (OUTPATIENT)
Dept: FAMILY MEDICINE CLINIC | Facility: CLINIC | Age: 74
End: 2019-03-04
Payer: MEDICARE

## 2019-03-04 VITALS
DIASTOLIC BLOOD PRESSURE: 84 MMHG | RESPIRATION RATE: 18 BRPM | OXYGEN SATURATION: 97 % | SYSTOLIC BLOOD PRESSURE: 136 MMHG | HEIGHT: 72 IN | BODY MASS INDEX: 39.91 KG/M2 | TEMPERATURE: 97 F | HEART RATE: 84 BPM | WEIGHT: 294.63 LBS

## 2019-03-04 DIAGNOSIS — M62.08 RECTUS DIASTASIS: ICD-10-CM

## 2019-03-04 DIAGNOSIS — M25.561 RIGHT KNEE PAIN, UNSPECIFIED CHRONICITY: ICD-10-CM

## 2019-03-04 DIAGNOSIS — Z00.00 ENCOUNTER FOR ANNUAL HEALTH EXAMINATION: ICD-10-CM

## 2019-03-04 DIAGNOSIS — Z00.00 ENCOUNTER FOR ROUTINE ADULT HEALTH EXAMINATION WITHOUT ABNORMAL FINDINGS: Primary | ICD-10-CM

## 2019-03-04 DIAGNOSIS — C61 PROSTATE CANCER (HCC): ICD-10-CM

## 2019-03-04 PROCEDURE — 99497 ADVNCD CARE PLAN 30 MIN: CPT | Performed by: FAMILY MEDICINE

## 2019-03-04 PROCEDURE — G0439 PPPS, SUBSEQ VISIT: HCPCS | Performed by: FAMILY MEDICINE

## 2019-03-04 NOTE — PATIENT INSTRUCTIONS
Refer to [facility] for colonoscopy  [phone]. Fe Damon's SCREENING SCHEDULE   Tests on this list are recommended by your physician but may not be covered, or covered at this frequency, by your insurer.  Please check with your insurance aguilar Covered up to Age 76     Colonoscopy Screen   Covered every 10 years- more often if abnormal Colonoscopy due on 04/08/2018 Update Health Maintenance if applicable    Flex Sigmoidoscopy Screen  Covered every 5 years No results found for this or any previous with your prescription benefits, but Medicare does not cover unless Medically needed    Zoster (Not covered by Medicare Part B) No orders found for this or any previous visit.  This may be covered with your pharmacy  prescription benefits     Recommended We

## 2019-03-04 NOTE — PROGRESS NOTES
HPI:   Marie Sahu is a 68year old male who presents for a Medicare Subsequent Annual Wellness visit (Pt already had Initial Annual Wellness).     Pt states he is concerned about his having difficulty with the hormone oncologist in [facility],  Having so the past but quit greater than 12 months ago.   Social History    Tobacco Use      Smoking status: Former Smoker        Packs/day: 1.00        Years: 35.00        Pack years: 35        Types: Cigarettes, Pipe, Cigars        Quit date: 1/1/2000        Years and latex.     CURRENT MEDICATIONS:     Outpatient Medications Marked as Taking for the 3/4/19 encounter (Office Visit) with Holden Nair MD:  ALLOPURINOL 300 MG Oral Tab TAKE ONE TABLET BY MOUTH ONCE DAILY   ATORVASTATIN 20 MG Oral Tab TAKE ONE TABLET BY drinks about 1.8 oz of alcohol per week. He reports that he does not use drugs.      REVIEW OF SYSTEMS:   Review of Systems      History obtained from the patient  EXAM:   /84 (BP Location: Left arm, Patient Position: Sitting, Cuff Size: large)   Puls Vaccination History     Immunization History   Administered Date(s) Administered   • FLUAD High Dose 72 yr and older (98064) 10/17/2017   • HEP A 04/15/1999, 10/13/1999   • HEP B 06/12/2002, 07/10/2002, 12/18/2002   • IPV 06/12/2002   • Influenza 10/17/2 data found.     Fasting Blood Sugar (FSB)Annually Glucose (mg/dL)   Date Value   10/15/2018 114 (H)          Cardiovascular Disease Screening     LDL Annually LDL Cholesterol (mg/dL)   Date Value   10/15/2018 70        EKG - w/ Initial Preventative Physical MONITORING Internal Lab or Procedure External Lab or Procedure      Annual Monitoring of Persistent     Medications (ACE/ARB, digoxin diuretics, anticonvulsants.)    Potassium  Annually Potassium (mmol/L)   Date Value   10/15/2018 4.0    No flowsheet data

## 2019-03-11 ENCOUNTER — TELEPHONE (OUTPATIENT)
Dept: FAMILY MEDICINE CLINIC | Facility: CLINIC | Age: 74
End: 2019-03-11

## 2019-03-13 RX ORDER — CLONIDINE HYDROCHLORIDE 0.1 MG/1
TABLET ORAL
Qty: 270 TABLET | Refills: 0 | Status: SHIPPED | OUTPATIENT
Start: 2019-03-13 | End: 2019-06-15

## 2019-03-13 RX ORDER — OLMESARTAN MEDOXOMIL AND HYDROCHLOROTHIAZIDE 40/12.5 40; 12.5 MG/1; MG/1
TABLET ORAL
Qty: 90 TABLET | Refills: 0 | Status: SHIPPED | OUTPATIENT
Start: 2019-03-13 | End: 2019-06-15

## 2019-03-13 RX ORDER — ALLOPURINOL 300 MG/1
TABLET ORAL
Qty: 90 TABLET | Refills: 0 | Status: SHIPPED | OUTPATIENT
Start: 2019-03-13 | End: 2019-06-08

## 2019-03-13 RX ORDER — ATORVASTATIN CALCIUM 20 MG/1
TABLET, FILM COATED ORAL
Qty: 90 TABLET | Refills: 0 | Status: SHIPPED | OUTPATIENT
Start: 2019-03-13 | End: 2019-06-15

## 2019-03-13 NOTE — TELEPHONE ENCOUNTER
Future appt:    Last Appointment:  3/4/2019  Cholesterol, Total (mg/dL)   Date Value   10/15/2018 142     HDL Cholesterol (mg/dL)   Date Value   10/15/2018 40     LDL Cholesterol (mg/dL)   Date Value   10/15/2018 70     Triglycerides (mg/dL)   Date Value

## 2019-03-16 ENCOUNTER — TELEPHONE (OUTPATIENT)
Dept: FAMILY MEDICINE CLINIC | Facility: CLINIC | Age: 74
End: 2019-03-16

## 2019-03-16 DIAGNOSIS — R79.89 ABNORMAL CBC: ICD-10-CM

## 2019-03-16 DIAGNOSIS — E78.00 PURE HYPERCHOLESTEROLEMIA: ICD-10-CM

## 2019-03-16 DIAGNOSIS — C61 MALIGNANT NEOPLASM OF PROSTATE (HCC): Primary | ICD-10-CM

## 2019-03-16 DIAGNOSIS — E55.9 VITAMIN D DEFICIENCY: ICD-10-CM

## 2019-03-16 DIAGNOSIS — E53.8 ADENOSYLCOBALAMIN SYNTHESIS DEFECT: ICD-10-CM

## 2019-03-16 DIAGNOSIS — R73.09 ELEVATED GLUCOSE: ICD-10-CM

## 2019-03-18 NOTE — TELEPHONE ENCOUNTER
Orders into chart for full fasting labs  Patient notified to come to his lab appointment fasting  Patient expressed understanding    Nati Marti, 03/18/19, 2:06 PM

## 2019-03-18 NOTE — TELEPHONE ENCOUNTER
Your Appointments    Monday April 22, 2019  8:45 AM CDT  Laboratory Visit with REF Gabriella M[facility]ugh [facility] ([facility] [place]) [address] [facility]  [phone]        Patient needs to have PSA done per Dr. Melina rOtega recommend

## 2019-04-05 ENCOUNTER — TELEPHONE (OUTPATIENT)
Dept: FAMILY MEDICINE CLINIC | Facility: CLINIC | Age: 74
End: 2019-04-05

## 2019-04-05 ENCOUNTER — NURSE ONLY (OUTPATIENT)
Dept: FAMILY MEDICINE CLINIC | Facility: CLINIC | Age: 74
End: 2019-04-05
Payer: MEDICARE

## 2019-04-05 VITALS — TEMPERATURE: 98 F

## 2019-04-05 DIAGNOSIS — Z23 IMMUNIZATION DUE: Primary | ICD-10-CM

## 2019-04-05 DIAGNOSIS — Z23 NEED FOR VACCINATION: ICD-10-CM

## 2019-04-05 PROCEDURE — 90714 TD VACC NO PRESV 7 YRS+ IM: CPT | Performed by: FAMILY MEDICINE

## 2019-04-05 PROCEDURE — 90471 IMMUNIZATION ADMIN: CPT | Performed by: FAMILY MEDICINE

## 2019-04-05 NOTE — TELEPHONE ENCOUNTER
order needed for tetnus shot, it was talked about at last appointment and has now cut himself last night was told if this happened he would need it within 3 days of an injury, please advise

## 2019-04-05 NOTE — TELEPHONE ENCOUNTER
Appt given.     Future Appointments   Date Time Provider Leeann Flor   4/5/2019  4:00 PM [facility] [place] NURSE [facility] [place]   4/22/2019  8:45 AM [facility]F [facility] REF [facility]

## 2019-04-05 NOTE — TELEPHONE ENCOUNTER
Pt was seen recently with Dr. Cipriano Crow- 3/4/19. Pt states at time of visit, he brought up question if he needs tetanus- states he knew he was due for one.   Pt states he was told to call if he has any injuries, pt states he was told he would need vaccination w

## 2019-04-22 ENCOUNTER — TELEPHONE (OUTPATIENT)
Dept: FAMILY MEDICINE CLINIC | Facility: CLINIC | Age: 74
End: 2019-04-22

## 2019-04-22 ENCOUNTER — LABORATORY ENCOUNTER (OUTPATIENT)
Dept: LAB | Age: 74
End: 2019-04-22
Attending: FAMILY MEDICINE
Payer: MEDICARE

## 2019-04-22 DIAGNOSIS — R73.09 ELEVATED GLUCOSE: ICD-10-CM

## 2019-04-22 DIAGNOSIS — R79.89 ABNORMAL CBC: ICD-10-CM

## 2019-04-22 DIAGNOSIS — E55.9 VITAMIN D DEFICIENCY: ICD-10-CM

## 2019-04-22 DIAGNOSIS — C61 MALIGNANT NEOPLASM OF PROSTATE (HCC): ICD-10-CM

## 2019-04-22 DIAGNOSIS — E78.00 PURE HYPERCHOLESTEROLEMIA: ICD-10-CM

## 2019-04-22 PROCEDURE — 80061 LIPID PANEL: CPT

## 2019-04-22 PROCEDURE — 81003 URINALYSIS AUTO W/O SCOPE: CPT

## 2019-04-22 PROCEDURE — 83036 HEMOGLOBIN GLYCOSYLATED A1C: CPT

## 2019-04-22 PROCEDURE — 83540 ASSAY OF IRON: CPT

## 2019-04-22 PROCEDURE — 36415 COLL VENOUS BLD VENIPUNCTURE: CPT

## 2019-04-22 PROCEDURE — 80053 COMPREHEN METABOLIC PANEL: CPT

## 2019-04-22 PROCEDURE — 84550 ASSAY OF BLOOD/URIC ACID: CPT

## 2019-04-22 PROCEDURE — 84153 ASSAY OF PSA TOTAL: CPT

## 2019-04-22 PROCEDURE — 83550 IRON BINDING TEST: CPT

## 2019-04-22 PROCEDURE — 85025 COMPLETE CBC W/AUTO DIFF WBC: CPT

## 2019-04-22 PROCEDURE — 82306 VITAMIN D 25 HYDROXY: CPT

## 2019-04-22 PROCEDURE — 82550 ASSAY OF CK (CPK): CPT

## 2019-04-22 PROCEDURE — 82728 ASSAY OF FERRITIN: CPT

## 2019-04-22 NOTE — TELEPHONE ENCOUNTER
----- Message from Sydnee Blanchard MD sent at 4/22/2019  5:38 PM CDT -----  Normal (urinalysis) results,   please notify patient.    [employer] message sent

## 2019-04-23 NOTE — TELEPHONE ENCOUNTER
Chart reviewed and pt has reviewed labs/results/recommendations.     Results faxed to Urology - Dr. Carlos Hong at [phone]

## 2019-04-23 NOTE — TELEPHONE ENCOUNTER
----- Message from Carolina Iglesias MD sent at 4/22/2019  6:54 PM CDT -----  Labs look good. AIc is slightly higher,  Recommend attention to diet and exercise. Continue vitamin D supplementation. Goal is 50. PSA is stable.  Fax to urology    Continue pre

## 2019-06-12 NOTE — TELEPHONE ENCOUNTER
Future appt:    Last Appointment:  3/4/2019  Cholesterol, Total (mg/dL)   Date Value   04/22/2019 139     HDL Cholesterol (mg/dL)   Date Value   04/22/2019 36 (L)     LDL Cholesterol (mg/dL)   Date Value   04/22/2019 70     Triglycerides (mg/dL)   Date Sofia

## 2019-06-15 RX ORDER — ALLOPURINOL 300 MG/1
TABLET ORAL
Qty: 90 TABLET | Refills: 0 | Status: SHIPPED | OUTPATIENT
Start: 2019-06-15 | End: 2019-09-10

## 2019-06-17 RX ORDER — ATORVASTATIN CALCIUM 20 MG/1
TABLET, FILM COATED ORAL
Qty: 90 TABLET | Refills: 0 | Status: SHIPPED | OUTPATIENT
Start: 2019-06-17 | End: 2019-09-10

## 2019-06-17 RX ORDER — OLMESARTAN MEDOXOMIL AND HYDROCHLOROTHIAZIDE 40/12.5 40; 12.5 MG/1; MG/1
TABLET ORAL
Qty: 90 TABLET | Refills: 0 | Status: SHIPPED | OUTPATIENT
Start: 2019-06-17 | End: 2019-09-10

## 2019-06-17 RX ORDER — CLONIDINE HYDROCHLORIDE 0.1 MG/1
TABLET ORAL
Qty: 270 TABLET | Refills: 0 | Status: SHIPPED | OUTPATIENT
Start: 2019-06-17 | End: 2019-09-10

## 2019-06-17 NOTE — TELEPHONE ENCOUNTER
Future appt:  No future appointments.   Last Appointment:  3/4/2019  Cholesterol, Total (mg/dL)   Date Value   04/22/2019 139     HDL Cholesterol (mg/dL)   Date Value   04/22/2019 36 (L)     LDL Cholesterol (mg/dL)   Date Value   04/22/2019 70     Triglycer

## 2019-07-15 ENCOUNTER — TELEPHONE (OUTPATIENT)
Dept: FAMILY MEDICINE CLINIC | Facility: CLINIC | Age: 74
End: 2019-07-15

## 2019-07-15 DIAGNOSIS — C61 MALIGNANT NEOPLASM OF PROSTATE (HCC): Primary | ICD-10-CM

## 2019-07-15 NOTE — TELEPHONE ENCOUNTER
Pt states that he has had an appointment with Dr. Valerie Sutton and he states that the notes were sent to Dr. Radha Caban for review.  Pt states that he needs the order for a PSA test to be done based on this appointment and the collaboration that Dr. Radha Caban and Dr. Samy Keith

## 2019-07-15 NOTE — TELEPHONE ENCOUNTER
Patient states he needs to have PSA lab work, states they are being order by a different Doctor. Patient wants to know if Dr Jose Alfredo Dooley wants him to come in for more lab work so he can get them done the same day.  Please call back

## 2019-07-16 NOTE — TELEPHONE ENCOUNTER
Pt contacted and informed that order for PSA is in chart. Pt stated will call office back to schedule lab draw.

## 2019-07-19 ENCOUNTER — APPOINTMENT (OUTPATIENT)
Dept: LAB | Age: 74
End: 2019-07-19
Attending: FAMILY MEDICINE
Payer: MEDICARE

## 2019-07-19 DIAGNOSIS — C61 MALIGNANT NEOPLASM OF PROSTATE (HCC): ICD-10-CM

## 2019-07-19 LAB — PSA SERPL-MCNC: 0.04 NG/ML (ref ?–4)

## 2019-07-19 PROCEDURE — 84153 ASSAY OF PSA TOTAL: CPT

## 2019-07-19 PROCEDURE — 36415 COLL VENOUS BLD VENIPUNCTURE: CPT

## 2019-07-20 ENCOUNTER — TELEPHONE (OUTPATIENT)
Dept: FAMILY MEDICINE CLINIC | Facility: CLINIC | Age: 74
End: 2019-07-20

## 2019-07-20 NOTE — TELEPHONE ENCOUNTER
Let pt know the following below. Pt verbalized his understanding and had no other questions at this time.    Faxed to Dr Natacha Reid

## 2019-07-20 NOTE — TELEPHONE ENCOUNTER
----- Message from Eriberto Jo MD sent at 7/20/2019  7:48 AM CDT -----  This is slightly higher, than it was previously,   Please forward this to urology.

## 2019-09-10 RX ORDER — OLMESARTAN MEDOXOMIL AND HYDROCHLOROTHIAZIDE 40/12.5 40; 12.5 MG/1; MG/1
TABLET ORAL
Qty: 90 TABLET | Refills: 0 | Status: SHIPPED | OUTPATIENT
Start: 2019-09-10 | End: 2020-03-03

## 2019-09-10 RX ORDER — ATORVASTATIN CALCIUM 20 MG/1
TABLET, FILM COATED ORAL
Qty: 90 TABLET | Refills: 0 | Status: SHIPPED | OUTPATIENT
Start: 2019-09-10 | End: 2019-12-05

## 2019-09-10 RX ORDER — ALLOPURINOL 300 MG/1
TABLET ORAL
Qty: 90 TABLET | Refills: 0 | Status: SHIPPED | OUTPATIENT
Start: 2019-09-10 | End: 2019-12-05

## 2019-09-10 RX ORDER — CLONIDINE HYDROCHLORIDE 0.1 MG/1
TABLET ORAL
Qty: 270 TABLET | Refills: 0 | Status: SHIPPED | OUTPATIENT
Start: 2019-09-10 | End: 2019-12-05

## 2019-09-10 NOTE — TELEPHONE ENCOUNTER
Future appt:    Last Appointment with provider:  3/4/19 (Annual Physical)  Last appointment at [facility] [place]:  Visit date not found  Cholesterol, Total (mg/dL)   Date Value   04/22/2019 139     HDL Cholesterol (mg/dL)   Date Value   04/22/2019 36 (L)     LD

## 2019-09-11 ENCOUNTER — TELEPHONE (OUTPATIENT)
Dept: FAMILY MEDICINE CLINIC | Facility: CLINIC | Age: 74
End: 2019-09-11

## 2019-09-11 NOTE — TELEPHONE ENCOUNTER
Please advise orders to split medication. Refill was sent yesterday for Olmesartan/HCTZ 40-12.5mg. Received a fax from [facility] [place] stating that the medication is on a nationwide back order.      They would like to know if they can split the medication

## 2019-10-14 ENCOUNTER — TELEPHONE (OUTPATIENT)
Dept: FAMILY MEDICINE CLINIC | Facility: CLINIC | Age: 74
End: 2019-10-14

## 2019-10-14 DIAGNOSIS — C61 MALIGNANT NEOPLASM OF PROSTATE (HCC): Primary | ICD-10-CM

## 2019-10-14 NOTE — TELEPHONE ENCOUNTER
Re:   lab order from other Dr Morgan Slider PSA    - Please leave detailed messaage on voice mail if cannot be reached.    needs to schedule this week

## 2019-10-14 NOTE — TELEPHONE ENCOUNTER
Pt calling to state that order for PSA lab is needed in regards to upcoming appt with Dr. Becka Feliciano on 10/21/19  Pt informed that order would be sent to Dr Molina Zavala for review   Lab appt scheduled.   Future Appointments   Date Time Provider Leeann Andujar

## 2019-10-15 ENCOUNTER — APPOINTMENT (OUTPATIENT)
Dept: LAB | Age: 74
End: 2019-10-15
Attending: FAMILY MEDICINE
Payer: MEDICARE

## 2019-10-15 DIAGNOSIS — C61 MALIGNANT NEOPLASM OF PROSTATE (HCC): ICD-10-CM

## 2019-10-15 PROCEDURE — 36415 COLL VENOUS BLD VENIPUNCTURE: CPT

## 2019-10-15 PROCEDURE — 84153 ASSAY OF PSA TOTAL: CPT

## 2019-10-17 ENCOUNTER — TELEPHONE (OUTPATIENT)
Dept: FAMILY MEDICINE CLINIC | Facility: CLINIC | Age: 74
End: 2019-10-17

## 2019-10-17 NOTE — TELEPHONE ENCOUNTER
Per chart pt has viewed results/recommendations. Lab results faxed to Dr. Deon Gonzalez at [facility] at fax # [phone].

## 2019-10-17 NOTE — TELEPHONE ENCOUNTER
----- Message from Marli Hou MD sent at 10/16/2019  9:12 PM CDT -----  slightly higher than previous. Fax to urology at [facility]. Notify pt. Mychart message sent.

## 2019-12-05 RX ORDER — ATORVASTATIN CALCIUM 20 MG/1
TABLET, FILM COATED ORAL
Qty: 90 TABLET | Refills: 0 | Status: SHIPPED | OUTPATIENT
Start: 2019-12-05 | End: 2020-03-05

## 2019-12-05 RX ORDER — CLONIDINE HYDROCHLORIDE 0.1 MG/1
TABLET ORAL
Qty: 270 TABLET | Refills: 0 | Status: SHIPPED | OUTPATIENT
Start: 2019-12-05 | End: 2020-02-03

## 2019-12-05 RX ORDER — ALLOPURINOL 300 MG/1
TABLET ORAL
Qty: 90 TABLET | Refills: 0 | Status: SHIPPED | OUTPATIENT
Start: 2019-12-05 | End: 2020-03-05

## 2019-12-05 NOTE — TELEPHONE ENCOUNTER
Future appt:    Last Appointment with provider:  3/4/2019    Last appointment at [facility]:  Visit date not found  Cholesterol, Total (mg/dL)   Date Value   04/22/2019 139     HDL Cholesterol (mg/dL)   Date Value   04/22/2019 36 (L)     LDL Cholesterol

## 2019-12-06 RX ORDER — ALLOPURINOL 300 MG/1
TABLET ORAL
Qty: 90 TABLET | Refills: 0 | OUTPATIENT
Start: 2019-12-06

## 2019-12-06 RX ORDER — ATORVASTATIN CALCIUM 20 MG/1
TABLET, FILM COATED ORAL
Qty: 90 TABLET | Refills: 0 | OUTPATIENT
Start: 2019-12-06

## 2019-12-06 RX ORDER — CLONIDINE HYDROCHLORIDE 0.1 MG/1
TABLET ORAL
Qty: 270 TABLET | Refills: 0 | OUTPATIENT
Start: 2019-12-06

## 2019-12-07 DIAGNOSIS — I10 ESSENTIAL HYPERTENSION: Primary | ICD-10-CM

## 2019-12-07 DIAGNOSIS — E78.00 PURE HYPERCHOLESTEROLEMIA: ICD-10-CM

## 2019-12-07 NOTE — TELEPHONE ENCOUNTER
Refill request from [facility].      Future appt:    Last Appointment with provider:     4/5/19 with Dr. Viviana Palencia  Last appointment at [facility]:  4/5/19  Cholesterol, Total (mg/dL)   Date Value   04/22/2019 139     HDL Cholesterol (mg/dL)   Date Value   04

## 2019-12-08 RX ORDER — ALLOPURINOL 300 MG/1
TABLET ORAL
Qty: 90 TABLET | Refills: 0 | Status: SHIPPED | OUTPATIENT
Start: 2019-12-08 | End: 2020-02-03

## 2019-12-08 RX ORDER — CLONIDINE HYDROCHLORIDE 0.1 MG/1
TABLET ORAL
Qty: 270 TABLET | Refills: 0 | Status: SHIPPED | OUTPATIENT
Start: 2019-12-08 | End: 2020-03-05

## 2019-12-08 RX ORDER — HYDROCHLOROTHIAZIDE 12.5 MG/1
CAPSULE, GELATIN COATED ORAL
Qty: 90 CAPSULE | Refills: 0 | Status: SHIPPED | OUTPATIENT
Start: 2019-12-08 | End: 2020-02-03

## 2019-12-08 RX ORDER — OLMESARTAN MEDOXOMIL 40 MG/1
TABLET ORAL
Qty: 90 TABLET | Refills: 0 | Status: SHIPPED | OUTPATIENT
Start: 2019-12-08 | End: 2020-02-03

## 2019-12-08 RX ORDER — ATORVASTATIN CALCIUM 20 MG/1
TABLET, FILM COATED ORAL
Qty: 90 TABLET | Refills: 0 | Status: SHIPPED | OUTPATIENT
Start: 2019-12-08 | End: 2020-02-03

## 2019-12-09 DIAGNOSIS — I10 ESSENTIAL HYPERTENSION: ICD-10-CM

## 2019-12-09 RX ORDER — HYDROCHLOROTHIAZIDE 12.5 MG/1
CAPSULE, GELATIN COATED ORAL
Qty: 90 CAPSULE | Refills: 0 | OUTPATIENT
Start: 2019-12-09

## 2019-12-09 RX ORDER — OLMESARTAN MEDOXOMIL 40 MG/1
TABLET ORAL
Qty: 90 TABLET | Refills: 0 | OUTPATIENT
Start: 2019-12-09

## 2019-12-20 ENCOUNTER — TELEPHONE (OUTPATIENT)
Dept: FAMILY MEDICINE CLINIC | Facility: CLINIC | Age: 74
End: 2019-12-20

## 2019-12-21 NOTE — TELEPHONE ENCOUNTER
2/11/19 is a phone note. Patient was not seen that date. Will need to call April at [place] on Monday 12/23.   Curt Orellana, 12/21/19, 11:24 AM

## 2019-12-23 NOTE — TELEPHONE ENCOUNTER
[facility] informed there is not an office visit on date requested only a   Telephone note. Informed there is not an office visit before or after   2/11/19 phone note discussing sleep medicine.   Gera Morales, 12/23/19, 2:16 PM

## 2020-01-21 ENCOUNTER — TELEPHONE (OUTPATIENT)
Dept: FAMILY MEDICINE CLINIC | Facility: CLINIC | Age: 75
End: 2020-01-21

## 2020-01-21 DIAGNOSIS — I44.0 FIRST DEGREE ATRIOVENTRICULAR BLOCK: ICD-10-CM

## 2020-01-21 DIAGNOSIS — C61 MALIGNANT NEOPLASM OF PROSTATE (HCC): ICD-10-CM

## 2020-01-21 DIAGNOSIS — E55.9 VITAMIN D DEFICIENCY: Primary | ICD-10-CM

## 2020-01-21 DIAGNOSIS — E78.00 PURE HYPERCHOLESTEROLEMIA: ICD-10-CM

## 2020-01-21 NOTE — TELEPHONE ENCOUNTER
has px in March. needs refills on all rxs to [facility] in [place]. will run out 2nd week of March.  scheduled labs for April.  needs a psa for Dr. Jacoby Doyle at that time and  wants yearly labs done at that time also

## 2020-01-23 NOTE — TELEPHONE ENCOUNTER
Please review all meds with patient multiple are dupliates, and multiple preparations of drugs. Lab orders entered.

## 2020-02-03 NOTE — TELEPHONE ENCOUNTER
Spoke with patient and reviewed and clarified his medication list.    He will have [facility] in [facility] fax over refill requests when he is due. Patient verbalized understanding and has no further questions or concerns.

## 2020-02-12 ENCOUNTER — TELEPHONE (OUTPATIENT)
Dept: FAMILY MEDICINE CLINIC | Facility: CLINIC | Age: 75
End: 2020-02-12

## 2020-02-14 NOTE — TELEPHONE ENCOUNTER
Scheduled patient for a sleep fu appointment on 2/15/19 at 10 am.    Patient verbalized understanding and has no further questions or concerns.

## 2020-02-17 NOTE — TELEPHONE ENCOUNTER
Patient cancelled his Sleep FU appointment on 2/15/20. Sent letter to patient via [address] No

## 2020-03-03 RX ORDER — OLMESARTAN MEDOXOMIL AND HYDROCHLOROTHIAZIDE 40/12.5 40; 12.5 MG/1; MG/1
TABLET ORAL
Qty: 90 TABLET | Refills: 0 | Status: SHIPPED | OUTPATIENT
Start: 2020-03-03 | End: 2020-05-29

## 2020-03-03 NOTE — TELEPHONE ENCOUNTER
Please advise refill request of Olmesartan-HCTZ. Last Rx: 9/10/19    Future appt:     Your appointments     Date & Time Appointment Department [facility])    Mar 05, 2020  9:50 AM CST Medicare Annual Well Visit with Miguel Suarez  [facility]

## 2020-03-05 ENCOUNTER — TELEPHONE (OUTPATIENT)
Dept: FAMILY MEDICINE CLINIC | Facility: CLINIC | Age: 75
End: 2020-03-05

## 2020-03-05 ENCOUNTER — OFFICE VISIT (OUTPATIENT)
Dept: FAMILY MEDICINE CLINIC | Facility: CLINIC | Age: 75
End: 2020-03-05
Payer: MEDICARE

## 2020-03-05 VITALS
BODY MASS INDEX: 38.28 KG/M2 | DIASTOLIC BLOOD PRESSURE: 82 MMHG | TEMPERATURE: 97 F | WEIGHT: 282.63 LBS | HEART RATE: 70 BPM | HEIGHT: 72 IN | SYSTOLIC BLOOD PRESSURE: 126 MMHG | OXYGEN SATURATION: 98 %

## 2020-03-05 DIAGNOSIS — G47.33 OBSTRUCTIVE SLEEP APNEA SYNDROME: Primary | ICD-10-CM

## 2020-03-05 DIAGNOSIS — Z13.31 DEPRESSION SCREENING: ICD-10-CM

## 2020-03-05 DIAGNOSIS — Z00.00 ENCOUNTER FOR ANNUAL HEALTH EXAMINATION: ICD-10-CM

## 2020-03-05 PROBLEM — E53.8 VITAMIN B12 DEFICIENCY: Status: ACTIVE | Noted: 2020-03-05

## 2020-03-05 PROCEDURE — G0439 PPPS, SUBSEQ VISIT: HCPCS | Performed by: FAMILY MEDICINE

## 2020-03-05 PROCEDURE — G0444 DEPRESSION SCREEN ANNUAL: HCPCS | Performed by: FAMILY MEDICINE

## 2020-03-05 PROCEDURE — 99214 OFFICE O/P EST MOD 30 MIN: CPT | Performed by: FAMILY MEDICINE

## 2020-03-05 RX ORDER — CLONIDINE HYDROCHLORIDE 0.1 MG/1
TABLET ORAL
Qty: 270 TABLET | Refills: 0 | Status: SHIPPED | OUTPATIENT
Start: 2020-03-05 | End: 2020-06-01

## 2020-03-05 RX ORDER — ATORVASTATIN CALCIUM 20 MG/1
TABLET, FILM COATED ORAL
Qty: 90 TABLET | Refills: 0 | Status: SHIPPED | OUTPATIENT
Start: 2020-03-05 | End: 2020-05-29

## 2020-03-05 RX ORDER — ALLOPURINOL 300 MG/1
TABLET ORAL
Qty: 90 TABLET | Refills: 0 | Status: SHIPPED | OUTPATIENT
Start: 2020-03-05 | End: 2020-05-29

## 2020-03-05 NOTE — PATIENT INSTRUCTIONS
Arabella Damon's SCREENING SCHEDULE   Tests on this list are recommended by your physician but may not be covered, or covered at this frequency, by your insurer. Please check with your insurance carrier before scheduling to verify coverage.     PREVENTATIV every 10 years- more often if abnormal Colonoscopy due on 05/13/2024 Update [facility] if applicable    Flex Sigmoidoscopy Screen  Covered every 5 years No results found for this or any previous visit. No flowsheet data found.      Fecal Occult Bloo prescription benefits, but Medicare does not cover unless Medically needed    Zoster (Not covered by Medicare Part B) No orders found for this or any previous visit.  This may be covered with your pharmacy  prescription benefits     Recommended Websites for

## 2020-03-05 NOTE — PROGRESS NOTES
HPI:   Rose Mary Church is a 76year old male who presents for a Medicare Subsequent Annual Wellness visit (Pt already had Initial Annual Wellness). Pt is using and benefiting from his pap therapy.   He has been using nightly   He is using nightly and   H cigarettes, 9 yrs pipe, 6 years cigars [place]         CAGE Alcohol screening   Harpreet Broussard was screened for Alcohol abuse and had a score of 0 so is at low risk.      Patient Care Team: Patient Care Team:  Lex Pop MD as PCP - [facility] rhinitis (1961), [facility] ([facility]), Essential hypertension, Hyperlipidemia (1990), and Sleep apnea (1996).     He  has a past surgical history that includes tonsillectomy; other surgical history; colonoscopy (Since 1995); hernia surgery (2007); skin surge taken:  3/5/2020  9:22 AM  Screening Method:  Finger Rub  Finger Rub Result:  Pass                  Visual Acuity  Right Eye Visual Acuity: Corrected Right Eye Chart Acuity: 20/25   Left Eye Visual Acuity: Corrected Left Eye Chart Acuity: 20/25   Both Eyes a Medicare Assessment. PLAN SUMMARY:   Diagnoses and all orders for this visit:    Obstructive sleep apnea syndrome   Patient is using and benefiting from his cpap. Awakens more refreshed and not having any issues with machine or complications.   Sara applicable    Flex Sigmoidoscopy Screen every 10 years No results found for this or any previous visit. No flowsheet data found. Fecal Occult Blood Annually No results found for: FOB No flowsheet data found.     Glaucoma Screening      Ophthalmology Bethel Sammy

## 2020-03-05 NOTE — TELEPHONE ENCOUNTER
Allopurinol LR - 12/5/19, 90 tab, 0 refills  Atorvastatin LR - 12/5/19, 90 tab, 0 refills  Clonidine LR - 12/8/19, 270 tab, 0 refills    LOV - 3/4/19 Physical  NOV - 3/5/20 Medicare Physical    Future appt:     Your appointments     Date & Time Appointment

## 2020-03-05 NOTE — TELEPHONE ENCOUNTER
Spoke with Jr Hicks from [place] [facility] and she is faxing over the patient's CMN sheet and they are requesting further information to be faxes. Waiting for CMN.

## 2020-04-15 ENCOUNTER — TELEPHONE (OUTPATIENT)
Dept: FAMILY MEDICINE CLINIC | Facility: CLINIC | Age: 75
End: 2020-04-15

## 2020-04-15 NOTE — TELEPHONE ENCOUNTER
faxed over all BW orders dated 1/23/2020  to [facility] per pt request as his appt here for BW was cancelled. Confirmation of receipt of fax received.

## 2020-05-11 ENCOUNTER — PATIENT MESSAGE (OUTPATIENT)
Dept: FAMILY MEDICINE CLINIC | Facility: CLINIC | Age: 75
End: 2020-05-11

## 2020-05-11 NOTE — TELEPHONE ENCOUNTER
From: Marta Austin  To: Mel Estrada MD  Sent: 5/11/2020 10:21 AM CDT  Subject: Test Results Question    FYI I had a blood draw at [facility] last week to get a PSA for Dr. Fany Velarde. It was a 0.09 (yeah!).  The orders for the tests you wanted were a

## 2020-05-11 NOTE — TELEPHONE ENCOUNTER
Agreed, will ask nursing staff to enter them to your [facility] Chart. Overall labs look good. Vitamin d and b12 are good. Iron profile is great. And cbc and cmp looks good as well.    Continue present managment       Please add labs from 5/6/20 to flow

## 2020-05-13 ENCOUNTER — TELEPHONE (OUTPATIENT)
Dept: FAMILY MEDICINE CLINIC | Facility: CLINIC | Age: 75
End: 2020-05-13

## 2020-05-13 DIAGNOSIS — E66.09 CLASS 2 OBESITY DUE TO EXCESS CALORIES WITH BODY MASS INDEX (BMI) OF 38.0 TO 38.9 IN ADULT, UNSPECIFIED WHETHER SERIOUS COMORBIDITY PRESENT: ICD-10-CM

## 2020-05-13 DIAGNOSIS — K76.9 HEPATIC DYSFUNCTION: ICD-10-CM

## 2020-05-13 DIAGNOSIS — E55.9 VITAMIN D DEFICIENCY: Primary | ICD-10-CM

## 2020-05-13 NOTE — TELEPHONE ENCOUNTER
I have seen it! And have it at home and working on it. There are multiple diagnosis that will work. I Entered a new order here and we can send it with the updated diagnosis to [facility] I suppose. I have sent the new order by Right fax.    Please notify

## 2020-05-29 RX ORDER — ATORVASTATIN CALCIUM 20 MG/1
TABLET, FILM COATED ORAL
Qty: 90 TABLET | Refills: 0 | Status: SHIPPED | OUTPATIENT
Start: 2020-05-29 | End: 2020-08-27

## 2020-05-29 RX ORDER — ALLOPURINOL 300 MG/1
TABLET ORAL
Qty: 90 TABLET | Refills: 0 | Status: SHIPPED | OUTPATIENT
Start: 2020-05-29 | End: 2020-08-27

## 2020-05-29 RX ORDER — OLMESARTAN MEDOXOMIL AND HYDROCHLOROTHIAZIDE 40/12.5 40; 12.5 MG/1; MG/1
TABLET ORAL
Qty: 90 TABLET | Refills: 0 | Status: SHIPPED | OUTPATIENT
Start: 2020-05-29 | End: 2020-08-27

## 2020-05-29 NOTE — TELEPHONE ENCOUNTER
Please advise refills of Olmesartan-HCTZ 40-12.5mg, Allopurinol 300mg, Atorvastatin 20mg. Last Rx: 3/5/20    Future appt:     Your appointments     Date & Time Appointment Department [facility])    Sep 08, 2020  9:30 AM CDT Sleep Follow Up with Bertha Reaves

## 2020-06-01 RX ORDER — CLONIDINE HYDROCHLORIDE 0.1 MG/1
TABLET ORAL
Qty: 270 TABLET | Refills: 0 | Status: SHIPPED | OUTPATIENT
Start: 2020-06-01 | End: 2020-11-16

## 2020-06-01 NOTE — TELEPHONE ENCOUNTER
Future appt:     Your appointments     Date & Time Appointment Department [facility])    Sep 08, 2020  9:30 AM CDT Sleep Follow Up with Lex Pop MD [address] [place] [address])            [employer]

## 2020-08-26 NOTE — TELEPHONE ENCOUNTER
Future appt:     Your appointments     Date & Time Appointment Department [facility])    Sep 08, 2020  9:30 AM CDT Sleep Follow Up with Sadie Rodriguez MD [facility], [facility] ([facility])            [employer]

## 2020-08-27 RX ORDER — OLMESARTAN MEDOXOMIL AND HYDROCHLOROTHIAZIDE 40/12.5 40; 12.5 MG/1; MG/1
TABLET ORAL
Qty: 90 TABLET | Refills: 0 | Status: SHIPPED | OUTPATIENT
Start: 2020-08-27 | End: 2020-11-19

## 2020-08-27 RX ORDER — ATORVASTATIN CALCIUM 20 MG/1
TABLET, FILM COATED ORAL
Qty: 90 TABLET | Refills: 0 | Status: SHIPPED | OUTPATIENT
Start: 2020-08-27 | End: 2020-12-21

## 2020-08-27 RX ORDER — ALLOPURINOL 300 MG/1
TABLET ORAL
Qty: 90 TABLET | Refills: 0 | Status: SHIPPED | OUTPATIENT
Start: 2020-08-27 | End: 2020-11-19

## 2020-09-08 ENCOUNTER — OFFICE VISIT (OUTPATIENT)
Dept: FAMILY MEDICINE CLINIC | Facility: CLINIC | Age: 75
End: 2020-09-08
Payer: MEDICARE

## 2020-09-08 VITALS
SYSTOLIC BLOOD PRESSURE: 126 MMHG | TEMPERATURE: 97 F | HEART RATE: 83 BPM | DIASTOLIC BLOOD PRESSURE: 80 MMHG | BODY MASS INDEX: 38.19 KG/M2 | WEIGHT: 282 LBS | OXYGEN SATURATION: 98 % | HEIGHT: 72 IN

## 2020-09-08 DIAGNOSIS — G47.33 OBSTRUCTIVE SLEEP APNEA SYNDROME: ICD-10-CM

## 2020-09-08 DIAGNOSIS — G47.52 REM SLEEP BEHAVIOR DISORDER: Primary | ICD-10-CM

## 2020-09-08 PROCEDURE — 99214 OFFICE O/P EST MOD 30 MIN: CPT | Performed by: FAMILY MEDICINE

## 2020-10-16 ENCOUNTER — TELEPHONE (OUTPATIENT)
Dept: FAMILY MEDICINE CLINIC | Facility: CLINIC | Age: 75
End: 2020-10-16

## 2020-10-16 DIAGNOSIS — E53.8 VITAMIN B12 DEFICIENCY: ICD-10-CM

## 2020-10-16 DIAGNOSIS — I10 ESSENTIAL HYPERTENSION: ICD-10-CM

## 2020-10-16 DIAGNOSIS — E78.00 PURE HYPERCHOLESTEROLEMIA: ICD-10-CM

## 2020-10-16 DIAGNOSIS — E55.9 VITAMIN D DEFICIENCY: Primary | ICD-10-CM

## 2020-10-16 DIAGNOSIS — R97.20 ELEVATED PROSTATE SPECIFIC ANTIGEN (PSA): ICD-10-CM

## 2020-10-16 NOTE — TELEPHONE ENCOUNTER
Future Appointments   Date Time Provider Leeann Ray   3/8/2021  2:00 PM Koffi Harris MD [facility]     Please enter BW orders for a diagnostic PSA (for Dr Natacha Reid) and if you want anything added.   Also there are orders from January

## 2020-10-16 NOTE — TELEPHONE ENCOUNTER
Future appt:     Your appointments     Date & Time Appointment Department [facility])    Mar 08, 2021  2:00 PM CST Sleep Follow Up with Mihaela Cruz MD [facility], [facility]            [employer]

## 2020-10-20 ENCOUNTER — TELEPHONE (OUTPATIENT)
Dept: FAMILY MEDICINE CLINIC | Facility: CLINIC | Age: 75
End: 2020-10-20

## 2020-10-20 NOTE — TELEPHONE ENCOUNTER
Pt states that he only needs the PSA.  He states that he is not going to have labs in November because he just had them in May and they were normal. He states that Medicare isn't going to pay for it and he isn't having anything done that isn't covered when

## 2020-10-20 NOTE — TELEPHONE ENCOUNTER
estrada    RE: BW appt on 10/27. .       has questions / appt not there @ 915am      rsd to 10/27 @ 945am          Future Appointments   Date Time Provider Leeann Ray   10/27/2020  9:45 AM REF [place] [facility]   3/8/2021  2:00 PM Sadie Rodriguez

## 2020-10-27 ENCOUNTER — IMMUNIZATION (OUTPATIENT)
Dept: FAMILY MEDICINE CLINIC | Facility: CLINIC | Age: 75
End: 2020-10-27
Payer: MEDICARE

## 2020-10-27 ENCOUNTER — LAB ENCOUNTER (OUTPATIENT)
Dept: LAB | Age: 75
End: 2020-10-27
Attending: FAMILY MEDICINE
Payer: MEDICARE

## 2020-10-27 DIAGNOSIS — Z23 NEED FOR VACCINATION: ICD-10-CM

## 2020-10-27 DIAGNOSIS — R97.20 ELEVATED PROSTATE SPECIFIC ANTIGEN (PSA): ICD-10-CM

## 2020-10-27 PROCEDURE — 36415 COLL VENOUS BLD VENIPUNCTURE: CPT

## 2020-10-27 PROCEDURE — G0008 ADMIN INFLUENZA VIRUS VAC: HCPCS | Performed by: FAMILY MEDICINE

## 2020-10-27 PROCEDURE — 90662 IIV NO PRSV INCREASED AG IM: CPT | Performed by: FAMILY MEDICINE

## 2020-10-27 PROCEDURE — 84153 ASSAY OF PSA TOTAL: CPT

## 2020-10-28 ENCOUNTER — TELEPHONE (OUTPATIENT)
Dept: FAMILY MEDICINE CLINIC | Facility: CLINIC | Age: 75
End: 2020-10-28

## 2020-10-28 DIAGNOSIS — C61 CARCINOMA OF PROSTATE (HCC): Primary | ICD-10-CM

## 2020-10-28 NOTE — TELEPHONE ENCOUNTER
----- Message from Nina Yen MD sent at 10/27/2020  5:08 PM CDT -----  Higher than previously   Forward to Urology.

## 2020-10-29 NOTE — TELEPHONE ENCOUNTER
Patient notified of PSA results and Dr. Sukhdeep Jovel instructions. Patient states he has an appointment coming up with Dr. Vin Workman next week regarding his PSA and requests results to be faxed to him. PSA result faxed to Dr. Vin Workman.

## 2020-11-16 RX ORDER — CLONIDINE HYDROCHLORIDE 0.1 MG/1
TABLET ORAL
Qty: 270 TABLET | Refills: 0 | Status: SHIPPED | OUTPATIENT
Start: 2020-11-16 | End: 2021-04-17

## 2020-11-16 NOTE — TELEPHONE ENCOUNTER
Future appt:     Your appointments     Date & Time Appointment Department [facility])    Mar 08, 2021  2:00 PM CST Sleep Follow Up with Nancy Gimenez MD [address] [place] [address])            [employer]

## 2020-11-18 DIAGNOSIS — E78.00 PURE HYPERCHOLESTEROLEMIA: Primary | ICD-10-CM

## 2020-11-18 NOTE — TELEPHONE ENCOUNTER
*Pt is due for annual physical*    Future appt:     Your appointments     Date & Time Appointment Department [facility])    Mar 08, 2021  2:00 PM CST Sleep Follow Up with Koffi Harris MD 25 [facility], Lesli Turcios [address]

## 2020-11-19 RX ORDER — ALLOPURINOL 300 MG/1
TABLET ORAL
Qty: 90 TABLET | Refills: 1 | Status: SHIPPED | OUTPATIENT
Start: 2020-11-19 | End: 2021-08-03

## 2020-11-19 RX ORDER — OLMESARTAN MEDOXOMIL AND HYDROCHLOROTHIAZIDE 40/12.5 40; 12.5 MG/1; MG/1
TABLET ORAL
Qty: 90 TABLET | Refills: 1 | Status: SHIPPED | OUTPATIENT
Start: 2020-11-19 | End: 2021-08-03

## 2020-11-19 NOTE — TELEPHONE ENCOUNTER
Future appt: Your appointments     Date & Time Appointment Department [facility])    Mar 08, 2021  2:00 PM CST Had sleep follow up on 9/8/20 w/Dr. Mahad Valle.  Sleep Follow Up with Joi Roberts MD [address] [place] [address] [employer]

## 2020-12-21 RX ORDER — ATORVASTATIN CALCIUM 20 MG/1
TABLET, FILM COATED ORAL
Qty: 90 TABLET | Refills: 1 | Status: SHIPPED | OUTPATIENT
Start: 2020-12-21 | End: 2021-04-17

## 2020-12-21 NOTE — TELEPHONE ENCOUNTER
Future appt:     Your appointments     Date & Time Appointment Department [facility])    Mar 08, 2021  2:00 PM CST Sleep Follow Up with Verner Cones, MD [address] [place] [address], Beck Bailey ([facility])        May 18, 2021  9:10 AM

## 2021-02-01 DIAGNOSIS — Z23 NEED FOR VACCINATION: ICD-10-CM

## 2021-02-08 DIAGNOSIS — E78.00 PURE HYPERCHOLESTEROLEMIA: ICD-10-CM

## 2021-02-08 RX ORDER — ATORVASTATIN CALCIUM 20 MG/1
TABLET, FILM COATED ORAL
Qty: 90 TABLET | Refills: 0 | OUTPATIENT
Start: 2021-02-08

## 2021-02-08 NOTE — TELEPHONE ENCOUNTER
Script filled and approved by pharmacy 12/21/20 for 90tabs w/ 1 refill to soon to refill. Future appt:     Your appointments     Date & Time Appointment Department [facility])    Mar 08, 2021  2:00 PM CST Sleep Follow Up with Bertha Reaves MD [address] [place] [address]

## 2021-03-08 ENCOUNTER — OFFICE VISIT (OUTPATIENT)
Dept: FAMILY MEDICINE CLINIC | Facility: CLINIC | Age: 76
End: 2021-03-08
Payer: MEDICARE

## 2021-03-08 VITALS
TEMPERATURE: 98 F | WEIGHT: 285.81 LBS | DIASTOLIC BLOOD PRESSURE: 88 MMHG | HEART RATE: 75 BPM | HEIGHT: 72.25 IN | RESPIRATION RATE: 16 BRPM | OXYGEN SATURATION: 99 % | SYSTOLIC BLOOD PRESSURE: 124 MMHG | BODY MASS INDEX: 38.29 KG/M2

## 2021-03-08 DIAGNOSIS — C61 MALIGNANT NEOPLASM OF PROSTATE (HCC): ICD-10-CM

## 2021-03-08 DIAGNOSIS — E55.9 VITAMIN D DEFICIENCY: ICD-10-CM

## 2021-03-08 DIAGNOSIS — G47.33 OBSTRUCTIVE SLEEP APNEA SYNDROME: Primary | ICD-10-CM

## 2021-03-08 DIAGNOSIS — E78.00 PURE HYPERCHOLESTEROLEMIA: ICD-10-CM

## 2021-03-08 DIAGNOSIS — I10 ESSENTIAL HYPERTENSION: ICD-10-CM

## 2021-03-08 DIAGNOSIS — I44.0 FIRST DEGREE ATRIOVENTRICULAR BLOCK: ICD-10-CM

## 2021-03-08 DIAGNOSIS — M1A.00X0 IDIOPATHIC CHRONIC GOUT WITHOUT TOPHUS, UNSPECIFIED SITE: ICD-10-CM

## 2021-03-08 DIAGNOSIS — E53.8 VITAMIN B12 DEFICIENCY: ICD-10-CM

## 2021-03-08 PROCEDURE — 99214 OFFICE O/P EST MOD 30 MIN: CPT | Performed by: FAMILY MEDICINE

## 2021-03-08 NOTE — PROGRESS NOTES
[facility]  SLEEP PROGRESS NOTE        HPI:   This is a 76year old male coming in for Patient presents with:  Obstructive Sleep Apnea (NELL)      HPI: over the past week has had a tough time with staying asleep.  Due to some family issues HDBracytherapy   • Essential hypertension    • Hyperlipidemia 1990    Under Medical Control   • Sleep apnea 1996    Use CPAP     Past Surgical History:   Procedure Laterality Date   • COLONOSCOPY  Since 1995    As needed   • HERNIA SURGERY  2017    Umbilic use: No    Family History:  Family History   Problem Relation Age of Onset   • Cancer Father         Lung Cancer   • Diabetes Father    • Cancer Paternal Grandfather         Cancer   • Cancer Sister         Breast then Ovarian Cancer   • Stroke Maternal Gr List:  Patient Active Problem List:     First degree atrioventricular block     Benign enlargement of prostate     Eczema     Elevated prostate specific antigen (PSA)     Hemorrhoids     History of colonic polyps     Pure hypercholesterolemia     Essential atraumatic. Right Ear: External ear normal.      Left Ear: External ear normal.      Nose: Nose normal.      Mouth/Throat:      Mouth: Mucous membranes are moist.      Comments: Oral erythema.    Mal 1 tonsils 0     Eyes:      Conjunctiva/sclera: Conju CULTURE REFLEX; Future    7. Essential hypertension  - CK CREATINE KINASE (NOT CREATININE); Future  - LIPID PANEL; Future  - COMP METABOLIC PANEL (14); Future  - TSH W REFLEX TO FREE T4; Future  - URINALYSIS WITH CULTURE REFLEX; Future    8.  Idiopathic chr excuse any grammatical errors. Call my office if you have any questions regarding this note.  \"     Barbie Mckeon MD  3/8/2021  2:08 PM

## 2021-04-17 DIAGNOSIS — E78.00 PURE HYPERCHOLESTEROLEMIA: ICD-10-CM

## 2021-04-17 RX ORDER — ATORVASTATIN CALCIUM 20 MG/1
20 TABLET, FILM COATED ORAL EVERY EVENING
Qty: 90 TABLET | Refills: 1 | Status: SHIPPED | OUTPATIENT
Start: 2021-04-17 | End: 2021-10-08

## 2021-04-17 RX ORDER — CLONIDINE HYDROCHLORIDE 0.1 MG/1
0.1 TABLET ORAL 3 TIMES DAILY
Qty: 270 TABLET | Refills: 0 | Status: SHIPPED | OUTPATIENT
Start: 2021-04-17 | End: 2021-07-12

## 2021-04-17 NOTE — TELEPHONE ENCOUNTER
Future appt:     Your appointments     Date & Time Appointment Department [facility])    Apr 26, 2021  8:45 AM CDT Laboratory Visit with [facility] ([facility])        May 18, 2021  9:10 AM CDT Medicare Annual Well Visit with [facility]

## 2021-04-26 ENCOUNTER — LAB ENCOUNTER (OUTPATIENT)
Dept: LAB | Age: 76
End: 2021-04-26
Attending: FAMILY MEDICINE
Payer: MEDICARE

## 2021-04-26 ENCOUNTER — TELEPHONE (OUTPATIENT)
Dept: FAMILY MEDICINE CLINIC | Facility: CLINIC | Age: 76
End: 2021-04-26

## 2021-04-26 DIAGNOSIS — E78.00 PURE HYPERCHOLESTEROLEMIA: ICD-10-CM

## 2021-04-26 DIAGNOSIS — I10 ESSENTIAL HYPERTENSION: ICD-10-CM

## 2021-04-26 DIAGNOSIS — M1A.00X0 IDIOPATHIC CHRONIC GOUT WITHOUT TOPHUS, UNSPECIFIED SITE: ICD-10-CM

## 2021-04-26 DIAGNOSIS — C61 MALIGNANT NEOPLASM OF PROSTATE (HCC): ICD-10-CM

## 2021-04-26 DIAGNOSIS — E55.9 VITAMIN D DEFICIENCY: ICD-10-CM

## 2021-04-26 DIAGNOSIS — I44.0 FIRST DEGREE ATRIOVENTRICULAR BLOCK: ICD-10-CM

## 2021-04-26 DIAGNOSIS — E53.8 VITAMIN B12 DEFICIENCY: ICD-10-CM

## 2021-04-26 PROCEDURE — 36415 COLL VENOUS BLD VENIPUNCTURE: CPT

## 2021-04-26 PROCEDURE — 82306 VITAMIN D 25 HYDROXY: CPT

## 2021-04-26 PROCEDURE — 84443 ASSAY THYROID STIM HORMONE: CPT

## 2021-04-26 PROCEDURE — 81003 URINALYSIS AUTO W/O SCOPE: CPT

## 2021-04-26 PROCEDURE — 80061 LIPID PANEL: CPT

## 2021-04-26 PROCEDURE — 84153 ASSAY OF PSA TOTAL: CPT

## 2021-04-26 PROCEDURE — 82550 ASSAY OF CK (CPK): CPT

## 2021-04-26 PROCEDURE — 80053 COMPREHEN METABOLIC PANEL: CPT

## 2021-04-26 PROCEDURE — 84550 ASSAY OF BLOOD/URIC ACID: CPT

## 2021-04-26 PROCEDURE — 82607 VITAMIN B-12: CPT

## 2021-04-26 NOTE — TELEPHONE ENCOUNTER
Pt received first moderna covid vaccine on 3/9/21 and second vaccine on 4/7/21. Would like covid vaccine order in chart cancelled.

## 2021-04-27 ENCOUNTER — TELEPHONE (OUTPATIENT)
Dept: FAMILY MEDICINE CLINIC | Facility: CLINIC | Age: 76
End: 2021-04-27

## 2021-04-27 DIAGNOSIS — R73.9 ELEVATED SERUM GLUCOSE: Primary | ICD-10-CM

## 2021-04-27 NOTE — TELEPHONE ENCOUNTER
----- Message from Ravi Montiel MD sent at 4/27/2021  7:13 AM CDT -----  Sugar is higher,  suspect that he is trending towards diabetes. Weight loss, diet, exercise,   ADD AIC>. PSA is increasing. Recommend  forward to urology.      Triglycerides a

## 2021-04-27 NOTE — TELEPHONE ENCOUNTER
Lab unable to add A1C to yesterday's blood. Please advise whether to change entered order to office visit fingerstick A1C.

## 2021-04-27 NOTE — TELEPHONE ENCOUNTER
Patient notified of lab recommendations. He has viewed results on Nadanu. States Dr. Josephine Sun is watching his PSA with him and has a plan for treatment, will continue to monitor every 6 months. Patient will come in for A1C draw this week.

## 2021-05-10 ENCOUNTER — LAB ENCOUNTER (OUTPATIENT)
Dept: LAB | Age: 76
End: 2021-05-10
Attending: FAMILY MEDICINE
Payer: MEDICARE

## 2021-05-10 DIAGNOSIS — C61 CARCINOMA OF PROSTATE (HCC): ICD-10-CM

## 2021-05-10 DIAGNOSIS — C61 MALIGNANT NEOPLASM OF PROSTATE (HCC): Primary | ICD-10-CM

## 2021-05-10 PROCEDURE — 83036 HEMOGLOBIN GLYCOSYLATED A1C: CPT | Performed by: FAMILY MEDICINE

## 2021-05-10 PROCEDURE — 36415 COLL VENOUS BLD VENIPUNCTURE: CPT | Performed by: FAMILY MEDICINE

## 2021-05-11 ENCOUNTER — TELEPHONE (OUTPATIENT)
Dept: FAMILY MEDICINE CLINIC | Facility: CLINIC | Age: 76
End: 2021-05-11

## 2021-05-11 NOTE — TELEPHONE ENCOUNTER
----- Message from Marta Nair MD sent at 5/10/2021  6:37 PM CDT -----  Slightly worse AIC<   Would he like to trial diet and exercise. Or would he like medications?

## 2021-05-11 NOTE — TELEPHONE ENCOUNTER
BRIGID    Pt states that he wants to talk to Dr lim about what to do moving forward at his next appt coming up next week. He does not know what a glucose level of 154 means, he wants to know if that is something he will die from?      He states that there

## 2021-05-18 ENCOUNTER — OFFICE VISIT (OUTPATIENT)
Dept: FAMILY MEDICINE CLINIC | Facility: CLINIC | Age: 76
End: 2021-05-18
Payer: MEDICARE

## 2021-05-18 VITALS
HEIGHT: 71.75 IN | OXYGEN SATURATION: 97 % | DIASTOLIC BLOOD PRESSURE: 78 MMHG | WEIGHT: 282.63 LBS | HEART RATE: 64 BPM | RESPIRATION RATE: 18 BRPM | BODY MASS INDEX: 38.7 KG/M2 | TEMPERATURE: 98 F | SYSTOLIC BLOOD PRESSURE: 126 MMHG

## 2021-05-18 DIAGNOSIS — E11.9 TYPE 2 DIABETES MELLITUS WITHOUT COMPLICATION, WITHOUT LONG-TERM CURRENT USE OF INSULIN (HCC): ICD-10-CM

## 2021-05-18 DIAGNOSIS — Z00.00 ENCOUNTER FOR ANNUAL HEALTH EXAMINATION: Primary | ICD-10-CM

## 2021-05-18 PROBLEM — M54.6 MIDLINE THORACIC BACK PAIN: Status: RESOLVED | Noted: 2017-10-17 | Resolved: 2021-05-18

## 2021-05-18 PROCEDURE — G0439 PPPS, SUBSEQ VISIT: HCPCS | Performed by: FAMILY MEDICINE

## 2021-05-18 NOTE — PATIENT INSTRUCTIONS
Discussed exercise for 30 minutes daily to sweating/ target heart rate.     Schedule diabetic eye exam.       Rachelle Damon's SCREENING SCHEDULE   Tests on this list are recommended by your physician but may not be covered, or covered at this frequency, by history    Colorectal Cancer Screening Covered up to Age 76     Colonoscopy Screen   Covered every 10 years- more often if abnormal Colonoscopy due on 05/13/2024 Update Health Maintenance if applicable    Flex Sigmoidoscopy Screen  Covered every 5 years No Part B) Orders placed or performed in visit on 04/05/19   • TETANUS AND DIPHTHERIA, PRESERVE FREE    This may be covered with your prescription benefits, but Medicare does not cover unless Medically needed    Zoster (Not covered by Medicare Part B) No orde trouble using it. As time goes on, your body may stop making enough insulin. Then you may need medicine. Over time, many people with type 2 diabetes need medicine to manage their disease. But a healthy lifestyle is also important.  This is done through die times. Eat or drink these if you start to have symptoms of low blood sugar. Fiber  Fiber comes from plant foods. Your body can't digest most fiber.  Instead of raising blood sugar levels like other carbs, fiber stops blood sugar from rising too fast. Fibe eat. If your food doesn't have a nutrition label, you should be able to get an idea how many carbs there are per serving by using a book or website.    Two very important lines to look at on the label are the serving size and the total carbohydrate amount p Overview  Diabetes is a long-term health problem. It means your body doesn't make enough insulin. Or it may mean that your body can't use the insulin it makes. Insulin is a hormone in your body. It lets blood sugar (glucose) reach the cells in your body.  A and get exercise. · Don't smoke. Smoking worsens the effects of diabetes on your circulation. You are much more likely to have a heart attack if you have diabetes and you smoke. Also don't use e-cigarettes or vaping products.   · Take good care of your fee regular schedule. Do this even if you don't feel like eating. · Drink water or other liquids that don't have caffeine or calories. This will keep you from getting dehydrated. If you are nauseated or vomiting, takes small sips every 5 minutes.  To prevent d if you have any of these signs of low blood sugar and they don't go away with the above treatment suggestions:   · Fatigue  · Headache  · Shakes  · Excess sweating  · Hunger  · Feeling anxious or restless  · Eyesight changes  · Drowsiness  · Weakness  Call helps keep blood cholesterol at a healthy level. Did you know? Even though carbohydrates raise blood sugar, it’s best to have some in every meal. They are an important part of a healthy diet. Fat  Fat is an energy source that can be stored until needed. foods tend to have no cholesterol. Most are low in saturated fat. · Animal protein. This is found in fish, poultry, meat, cheese, milk, and eggs. These foods have cholesterol. They can be high in saturated fat. Aim for lean, lower-fat choices.  Don't eat fri manage your diabetes. The carbohydrates you eat become glucose in the blood. Talk with your healthcare provider about how many grams of carbohydrates are recommended for you at each meal. Eat 3 meals a day, at consistent times. Don't skip meals.  If you are reactions. It will also help you reduce the health risks of diabetes. There is no one specific diet that is right for everyone with diabetes. But there are general guidelines to follow.  A registered dietitian (ROBBY) will create a tailored diet approach that’ canola, or peanut oil. · Don't eat foods with added salt. Salt can contribute to high blood pressure, which can cause heart disease. People with diabetes already have a risk for high blood pressure and heart disease. · Stay at a healthy weight.  If you ne

## 2021-05-18 NOTE — PROGRESS NOTES
HPI:   Marta Austin is a 76year old male who presents for a Medicare Subsequent Annual Wellness visit (Pt already had Initial Annual Wellness). He continues with his urologist to follow his prostate cancer.    Wife is getting weaker and now feeding h Benign enlargement of prostate     Eczema     Elevated prostate specific antigen (PSA)     Hemorrhoids     History of colonic polyps     Pure hypercholesterolemia     Essential hypertension     Actinic keratosis     Obesity     Osteoarthrosis     Heredit 0.05 % External Ointment,   triamcinolone acetonide 0.1 % External Ointment,   triamcinolone acetonide 0.1 % External Cream,   B Complex Vitamins (B COMPLEX-B12) Oral Tab, Take 1,000 mcg by mouth daily.   Vitamin D3 2000 units Oral Cap, Take 3,000 Units by Ht 5' 11.75\" (1.822 m)   Wt 282 lb 9.6 oz (128.2 kg)   SpO2 97%   BMI 38.60 kg/m²   Estimated body mass index is 38.6 kg/m² as calculated from the following:    Height as of this encounter: 5' 11.75\" (1.822 m).     Weight as of this encounter: 282 lb 9.6 (07685) 10/27/2020   • FLUAD High Dose 65 yr and older (00759) 10/17/2017   • HEP A 04/15/1999, 10/13/1999   • HEP B 06/12/2002, 07/10/2002, 12/18/2002   • IPV 06/12/2002   • Influenza 10/17/2017, 10/31/2018, 11/02/2019   • Meningococcal-Menactra 06/12/200 Value   04/26/2021 116 (H)       Cardiovascular Disease Screening     LDL Annually LDL Cholesterol (mg/dL)   Date Value   04/26/2021 73        EKG - w/ Initial Preventative Physical Exam only, or if medically necessary Electrocardiogram date    Colorectal Persistent     Medications (ACE/ARB, digoxin diuretics, anticonvulsants.)    Potassium  Annually Potassium (mmol/L)   Date Value   04/26/2021 4.2     POTASSIUM (no units)   Date Value   05/06/2020 3.9   05/06/2020 3.9    No flowsheet data found.     Creatin

## 2021-06-15 ENCOUNTER — TELEPHONE (OUTPATIENT)
Dept: FAMILY MEDICINE CLINIC | Facility: CLINIC | Age: 76
End: 2021-06-15

## 2021-06-15 DIAGNOSIS — E11.65 TYPE 2 DIABETES MELLITUS WITH HYPERGLYCEMIA, WITHOUT LONG-TERM CURRENT USE OF INSULIN (HCC): Primary | ICD-10-CM

## 2021-07-12 RX ORDER — CLONIDINE HYDROCHLORIDE 0.1 MG/1
0.1 TABLET ORAL 3 TIMES DAILY
Qty: 270 TABLET | Refills: 0 | Status: SHIPPED | OUTPATIENT
Start: 2021-07-12 | End: 2021-10-08

## 2021-07-12 NOTE — TELEPHONE ENCOUNTER
Future appt:     Your appointments     Date & Time Appointment Department [facility])    Aug 24, 2021  9:30 AM CDT Follow Up Visit with Bernie Chaudhari MD 25 [facility], Angelina Ruelas ([place]) [facility]

## 2021-08-03 RX ORDER — OLMESARTAN MEDOXOMIL AND HYDROCHLOROTHIAZIDE 40/12.5 40; 12.5 MG/1; MG/1
TABLET ORAL
Qty: 90 TABLET | Refills: 0 | Status: SHIPPED | OUTPATIENT
Start: 2021-08-03 | End: 2021-12-04

## 2021-08-03 RX ORDER — ALLOPURINOL 300 MG/1
TABLET ORAL
Qty: 90 TABLET | Refills: 0 | Status: SHIPPED | OUTPATIENT
Start: 2021-08-03 | End: 2021-12-04

## 2021-08-03 NOTE — TELEPHONE ENCOUNTER
Future appt:     Your appointments     Date & Time Appointment Department [facility])    Aug 24, 2021  9:30 AM CDT Follow Up Visit with Frederic Ray MD 25 [facility], [facility] ([place]) [facility]

## 2021-08-16 ENCOUNTER — PATIENT MESSAGE (OUTPATIENT)
Dept: FAMILY MEDICINE CLINIC | Facility: CLINIC | Age: 76
End: 2021-08-16

## 2021-08-16 NOTE — TELEPHONE ENCOUNTER
From: Luis Miguel Lamas  To: Trish Stratton MD  Sent: 8/16/2021 8:49 AM CDT  Subject: Other    I have an appointment next Monday. My notes indicated that you want an A1C.  My notes also indicated that I did not need a blood draw prior to my appointment and that

## 2021-08-23 PROBLEM — G62.9 PERIPHERAL NEUROPATHY: Status: ACTIVE | Noted: 2021-08-23

## 2021-08-23 PROBLEM — I44.0 AV BLOCK, 1ST DEGREE: Status: ACTIVE | Noted: 2021-08-23

## 2021-08-23 PROBLEM — C61 PROSTATE CANCER (HCC): Status: ACTIVE | Noted: 2021-08-23

## 2021-08-23 PROBLEM — I10 HYPERTENSION: Status: ACTIVE | Noted: 2021-08-23

## 2021-08-23 PROBLEM — E78.00 HYPERCHOLESTEROLEMIA: Status: ACTIVE | Noted: 2021-08-23

## 2021-08-23 PROBLEM — M54.50 ACUTE RIGHT-SIDED LOW BACK PAIN WITHOUT SCIATICA: Status: ACTIVE | Noted: 2020-08-17

## 2021-08-23 PROBLEM — G47.33 OBSTRUCTIVE SLEEP APNEA: Status: ACTIVE | Noted: 2017-07-13

## 2021-08-24 ENCOUNTER — OFFICE VISIT (OUTPATIENT)
Dept: FAMILY MEDICINE CLINIC | Facility: CLINIC | Age: 76
End: 2021-08-24
Payer: MEDICARE

## 2021-08-24 VITALS
OXYGEN SATURATION: 98 % | HEIGHT: 71.75 IN | TEMPERATURE: 97 F | HEART RATE: 64 BPM | DIASTOLIC BLOOD PRESSURE: 78 MMHG | BODY MASS INDEX: 34.78 KG/M2 | SYSTOLIC BLOOD PRESSURE: 120 MMHG | WEIGHT: 254 LBS | RESPIRATION RATE: 18 BRPM

## 2021-08-24 DIAGNOSIS — I10 PRIMARY HYPERTENSION: ICD-10-CM

## 2021-08-24 DIAGNOSIS — E11.9 TYPE 2 DIABETES MELLITUS WITHOUT COMPLICATION, WITHOUT LONG-TERM CURRENT USE OF INSULIN (HCC): Primary | ICD-10-CM

## 2021-08-24 LAB
CARTRIDGE LOT#: NORMAL NUMERIC
HEMOGLOBIN A1C: 5.6 % (ref 4.3–5.6)

## 2021-08-24 PROCEDURE — 83036 HEMOGLOBIN GLYCOSYLATED A1C: CPT | Performed by: FAMILY MEDICINE

## 2021-08-24 PROCEDURE — 99214 OFFICE O/P EST MOD 30 MIN: CPT | Performed by: FAMILY MEDICINE

## 2021-08-24 RX ORDER — LANCETS 33 GAUGE
1 EACH MISCELLANEOUS 2 TIMES DAILY
COMMUNITY
Start: 2021-08-01

## 2021-08-24 RX ORDER — BLOOD SUGAR DIAGNOSTIC
1 STRIP MISCELLANEOUS 2 TIMES DAILY
COMMUNITY
Start: 2021-08-01

## 2021-08-24 NOTE — PROGRESS NOTES
[facility] [place]  PROGRESS NOTE        HPI:   This is a 68year old male coming in for Patient presents with: Follow - Up: 3mo    HPI  He is overall feeling fine. He notes that he is now seeing Dr. Spring Chavarria.     He is checking his blood sugar 46       Spouses Name: ab VAIL      Spouses Health:  Spinal muscular atrophy, needs walker and scooter, going to pick her up Monday.        Number of Children: 2 children, 3 grandchild      Denominational/ Sikhism: Anabaptism Latter day.       Other Social History route 2 (two) times daily. • Lancets (ONETOUCH DELICA PLUS [id number]) Does not apply Misc 1 strip by In Vitro route 2 (two) times daily.      • ALLOPURINOL 300 MG Oral Tab TAKE ONE TABLET BY MOUTH ONE TIME DAILY 90 tablet 0   • OLMESARTAN MEDOXOMIL-HCTZ Acute right-sided low back pain without sciatica     Hypertension     AV block, 1st degree     Peripheral neuropathy     Prostate cancer ([facility] [address]     Hypercholesterolemia      REVIEW OF SYSTEMS:   Review of Systems   Constitutional: Negative.     HENT: Negativ is soft. Musculoskeletal:         General: Normal range of motion. Cervical back: Normal range of motion and neck supple. Skin:     General: Skin is warm and dry.    Neurological:      Mental Status: He is alert and oriented to person, place, and t Pneumococcal (Prevnar 13) 11/04/2014   • Pneumovax 23 12/01/2010   • TDAP 03/02/2009   • Td, Preserv Free 04/05/2019   • Typhoid 04/15/1999, 06/12/2002   • Yellow Fever 04/15/1999   • Zoster Vaccine Live (Zostavax) 02/27/2009       Most Recent Immunization

## 2021-10-07 DIAGNOSIS — E78.00 PURE HYPERCHOLESTEROLEMIA: ICD-10-CM

## 2021-10-07 NOTE — TELEPHONE ENCOUNTER
Future appt:    Last Appointment with provider:   8/24/2021 for diabetes follow up.   Last appointment at [facility]:  8/24/2021  Cholesterol, Total (mg/dL)   Date Value   04/26/2021 141     HDL Cholesterol (mg/dL)   Date Value   04/26/2021 37 (L)     LDL

## 2021-10-08 RX ORDER — ATORVASTATIN CALCIUM 20 MG/1
20 TABLET, FILM COATED ORAL EVERY EVENING
Qty: 90 TABLET | Refills: 0 | Status: SHIPPED | OUTPATIENT
Start: 2021-10-08

## 2021-10-08 RX ORDER — CLONIDINE HYDROCHLORIDE 0.1 MG/1
TABLET ORAL
Qty: 270 TABLET | Refills: 0 | Status: SHIPPED | OUTPATIENT
Start: 2021-10-08

## 2021-11-01 ENCOUNTER — LABORATORY ENCOUNTER (OUTPATIENT)
Dept: LAB | Age: 76
End: 2021-11-01
Payer: MEDICARE

## 2021-11-01 DIAGNOSIS — C61 CARCINOMA OF PROSTATE (HCC): ICD-10-CM

## 2021-11-01 DIAGNOSIS — C61 MALIGNANT NEOPLASM OF PROSTATE (HCC): ICD-10-CM

## 2021-11-01 PROCEDURE — 84402 ASSAY OF FREE TESTOSTERONE: CPT

## 2021-11-01 PROCEDURE — 84154 ASSAY OF PSA FREE: CPT

## 2021-11-01 PROCEDURE — 36415 COLL VENOUS BLD VENIPUNCTURE: CPT

## 2021-11-01 PROCEDURE — 85025 COMPLETE CBC W/AUTO DIFF WBC: CPT

## 2021-11-01 PROCEDURE — 84403 ASSAY OF TOTAL TESTOSTERONE: CPT

## 2021-11-01 PROCEDURE — 80053 COMPREHEN METABOLIC PANEL: CPT

## 2021-11-01 PROCEDURE — 84153 ASSAY OF PSA TOTAL: CPT

## 2021-11-30 ENCOUNTER — TELEPHONE (OUTPATIENT)
Dept: FAMILY MEDICINE CLINIC | Facility: CLINIC | Age: 76
End: 2021-11-30

## 2021-11-30 NOTE — TELEPHONE ENCOUNTER
Patient can discuss results with Dr. Kelby Palacios- ordering physician. I would advise he schedule appointment with any provider here to review ascending Aortic aneurysm.

## 2021-11-30 NOTE — TELEPHONE ENCOUNTER
Patient is seeing Dr. Trini Sen for malignant neoplasm of prostate. Dr. Trini Sen ordered PET scan on 11/18/21 (see in care everywhere). He was not contacted by Dr. Fadumo Olivier office regarding results but saw them on mychart.  PET scan showed ascending aortic

## 2021-12-01 ENCOUNTER — OFFICE VISIT (OUTPATIENT)
Dept: FAMILY MEDICINE CLINIC | Facility: CLINIC | Age: 76
End: 2021-12-01
Payer: MEDICARE

## 2021-12-01 VITALS
DIASTOLIC BLOOD PRESSURE: 80 MMHG | RESPIRATION RATE: 18 BRPM | TEMPERATURE: 97 F | SYSTOLIC BLOOD PRESSURE: 130 MMHG | HEART RATE: 74 BPM | BODY MASS INDEX: 34.23 KG/M2 | OXYGEN SATURATION: 95 % | HEIGHT: 71.5 IN | WEIGHT: 250 LBS

## 2021-12-01 DIAGNOSIS — I71.2 THORACIC ASCENDING AORTIC ANEURYSM (HCC): Primary | ICD-10-CM

## 2021-12-01 PROCEDURE — 99213 OFFICE O/P EST LOW 20 MIN: CPT | Performed by: NURSE PRACTITIONER

## 2021-12-04 ENCOUNTER — TELEPHONE (OUTPATIENT)
Dept: FAMILY MEDICINE CLINIC | Facility: CLINIC | Age: 76
End: 2021-12-04

## 2021-12-04 RX ORDER — ALLOPURINOL 300 MG/1
300 TABLET ORAL DAILY
Qty: 90 TABLET | Refills: 0 | Status: SHIPPED | OUTPATIENT
Start: 2021-12-04

## 2021-12-04 RX ORDER — OLMESARTAN MEDOXOMIL AND HYDROCHLOROTHIAZIDE 40/12.5 40; 12.5 MG/1; MG/1
1 TABLET ORAL DAILY
Qty: 90 TABLET | Refills: 0 | Status: SHIPPED | OUTPATIENT
Start: 2021-12-04

## 2021-12-04 NOTE — TELEPHONE ENCOUNTER
Needs refill of allopurinol and olmesartan medoxomil sent to [facility] in [facility]. Pt states he only has 5 days left of blood pressure meds.

## 2021-12-04 NOTE — TELEPHONE ENCOUNTER
Future appt:    Last Appointment with provider: 12/1/21 test results.     8/24/2021 3 month follow up  Last appointment at [facility] [place]:  12/1/2021  Cholesterol, Total (mg/dL)   Date Value   04/26/2021 141     HDL Cholesterol (mg/dL)   Date Value   04/26/20

## 2021-12-06 ENCOUNTER — TELEPHONE (OUTPATIENT)
Dept: FAMILY MEDICINE CLINIC | Facility: CLINIC | Age: 76
End: 2021-12-06

## 2021-12-06 DIAGNOSIS — I71.2 THORACIC ASCENDING AORTIC ANEURYSM (HCC): Primary | ICD-10-CM

## 2021-12-06 PROBLEM — I71.21 THORACIC ASCENDING AORTIC ANEURYSM (HCC): Status: ACTIVE | Noted: 2021-12-06

## 2021-12-06 PROBLEM — I71.21 THORACIC ASCENDING AORTIC ANEURYSM: Status: ACTIVE | Noted: 2021-12-06

## 2021-12-06 NOTE — PROGRESS NOTES
HPI:    Patient ID: Marie Sahu is a 68year old male. HPI    Patient is following up from a PET scan. He had a PET scan secondary to his prostate cancer. He was found to have an ascending thoracic aneurysm. It was less than 5 cm in size.   He denie Comment:Other reaction(s): Blisters             Other reaction(s): Blisters  Lemongrass [Cymbopo*    TONGUE SWELLING  Terconazole             SWELLING  Terazosin Hcl               Comment:Other reaction(s): nasal congestion, dizzines,             edema  Pineda Referrals:  None      Patient Instructions   Recommend referral to cardio of vascular surgeon for evaluation. Repeat CT scan in 6 months unless recommended otherwise by surgeon. Follow up as needed.           KS#[id number]

## 2021-12-06 NOTE — TELEPHONE ENCOUNTER
Recommendations for a vascular surgeon for the aneurysm. Dr. Mikayla Benito.      [address] [facility], [address]    Phone: [phone]    (on website did also [facility]acic [facility], [facility] 80

## 2021-12-06 NOTE — PATIENT INSTRUCTIONS
Recommend referral to cardio of vascular surgeon for evaluation. Repeat CT scan in 6 months unless recommended otherwise by surgeon. Follow up as needed.

## 2022-02-24 RX ORDER — ATORVASTATIN CALCIUM 20 MG/1
TABLET, FILM COATED ORAL
Qty: 90 TABLET | Refills: 0 | Status: SHIPPED | OUTPATIENT
Start: 2022-02-24

## 2022-02-24 RX ORDER — CLONIDINE HYDROCHLORIDE 0.1 MG/1
TABLET ORAL
Qty: 270 TABLET | Refills: 0 | Status: SHIPPED | OUTPATIENT
Start: 2022-02-24

## 2022-02-24 NOTE — TELEPHONE ENCOUNTER
Future appt:    Last Appointment with provider:  12/1/21-  Advised Follow up as needed. Last refill: 10/8/21-  Atorvastatin 20 mg #90 0 refills  Clonidine 0.1 mg #270 0 refills      Cholesterol, Total (mg/dL)   Date Value   04/26/2021 141     HDL Cholesterol (mg/dL)   Date Value   04/26/2021 37 (L)     LDL Cholesterol (mg/dL)   Date Value   04/26/2021 73     Triglycerides (mg/dL)   Date Value   04/26/2021 156 (H)     Lab Results   Component Value Date     (H) 05/10/2021    A1C 5.6 08/24/2021     Lab Results   Component Value Date    TSH 1.410 04/26/2021       No follow-ups on file.

## 2022-02-24 NOTE — TELEPHONE ENCOUNTER
Future appt:    Last Appointment with provider:   12/1/2021. Last diabetes check 8/24/21. Due for an appt. Last appointment at [facility]:  12/1/2021  Cholesterol, Total (mg/dL)   Date Value   04/26/2021 141     HDL Cholesterol (mg/dL)   Date Value   04/26/2021 37 (L)     LDL Cholesterol (mg/dL)   Date Value   04/26/2021 73     Triglycerides (mg/dL)   Date Value   04/26/2021 156 (H)     Lab Results   Component Value Date     (H) 05/10/2021    A1C 5.6 08/24/2021     Lab Results   Component Value Date    TSH 1.410 04/26/2021       No follow-ups on file.

## 2022-02-25 NOTE — TELEPHONE ENCOUNTER
appt made    Future Appointments   Date Time Provider Leeann [place]   3/10/2022  8:00 AM VON Escamilla [facility] [place] [place]   5/23/2022  9:00 AM Brett Franco APRN [facility] [place]

## 2022-02-28 RX ORDER — OLMESARTAN MEDOXOMIL AND HYDROCHLOROTHIAZIDE 40/12.5 40; 12.5 MG/1; MG/1
1 TABLET ORAL DAILY
Qty: 90 TABLET | Refills: 0 | Status: SHIPPED | OUTPATIENT
Start: 2022-02-28

## 2022-02-28 RX ORDER — ALLOPURINOL 300 MG/1
300 TABLET ORAL DAILY
Qty: 90 TABLET | Refills: 0 | Status: SHIPPED | OUTPATIENT
Start: 2022-02-28

## 2022-03-07 DIAGNOSIS — R97.21 RISING PSA FOLLOWING TREATMENT FOR MALIGNANT NEOPLASM OF PROSTATE: Primary | ICD-10-CM

## 2022-03-10 ENCOUNTER — TELEPHONE (OUTPATIENT)
Dept: FAMILY MEDICINE CLINIC | Facility: CLINIC | Age: 77
End: 2022-03-10

## 2022-03-10 ENCOUNTER — OFFICE VISIT (OUTPATIENT)
Dept: FAMILY MEDICINE CLINIC | Facility: CLINIC | Age: 77
End: 2022-03-10
Payer: MEDICARE

## 2022-03-10 VITALS
SYSTOLIC BLOOD PRESSURE: 128 MMHG | BODY MASS INDEX: 35.33 KG/M2 | WEIGHT: 258 LBS | DIASTOLIC BLOOD PRESSURE: 74 MMHG | HEART RATE: 67 BPM | HEIGHT: 71.5 IN | TEMPERATURE: 97 F | RESPIRATION RATE: 18 BRPM | OXYGEN SATURATION: 99 %

## 2022-03-10 DIAGNOSIS — R39.198 VOIDING DIFFICULTY: ICD-10-CM

## 2022-03-10 DIAGNOSIS — I10 ESSENTIAL HYPERTENSION: ICD-10-CM

## 2022-03-10 DIAGNOSIS — C61 CARCINOMA OF PROSTATE (HCC): ICD-10-CM

## 2022-03-10 DIAGNOSIS — E11.9 TYPE 2 DIABETES MELLITUS WITHOUT COMPLICATION, WITHOUT LONG-TERM CURRENT USE OF INSULIN (HCC): Primary | ICD-10-CM

## 2022-03-10 DIAGNOSIS — R97.20 ELEVATED PROSTATE SPECIFIC ANTIGEN (PSA): ICD-10-CM

## 2022-03-10 LAB
APPEARANCE: CLEAR
BILIRUB UR QL STRIP.AUTO: NEGATIVE
BILIRUBIN: NEGATIVE
CLARITY UR REFRACT.AUTO: CLEAR
COLOR UR AUTO: YELLOW
GLUCOSE (URINE DIPSTICK): NEGATIVE MG/DL
GLUCOSE UR STRIP.AUTO-MCNC: NEGATIVE MG/DL
KETONES (URINE DIPSTICK): NEGATIVE MG/DL
KETONES UR STRIP.AUTO-MCNC: NEGATIVE MG/DL
LEUKOCYTE ESTERASE UR QL STRIP.AUTO: NEGATIVE
LEUKOCYTES: NEGATIVE
MULTISTIX LOT#: ABNORMAL NUMERIC
NITRITE UR QL STRIP.AUTO: NEGATIVE
NITRITE, URINE: NEGATIVE
PH UR STRIP.AUTO: 6 [PH] (ref 5–8)
PROT UR STRIP.AUTO-MCNC: NEGATIVE MG/DL
PROTEIN (URINE DIPSTICK): NEGATIVE MG/DL
RBC UR QL AUTO: NEGATIVE
SP GR UR STRIP.AUTO: 1.01 (ref 1–1.03)
SPECIFIC GRAVITY: 1.01 (ref 1–1.03)
URINE-COLOR: YELLOW
UROBILINOGEN UR STRIP.AUTO-MCNC: <2 MG/DL
UROBILINOGEN,SEMI-QN: 0.2 MG/DL (ref 0–1.9)

## 2022-03-10 PROCEDURE — 81003 URINALYSIS AUTO W/O SCOPE: CPT | Performed by: NURSE PRACTITIONER

## 2022-03-10 PROCEDURE — 87086 URINE CULTURE/COLONY COUNT: CPT | Performed by: NURSE PRACTITIONER

## 2022-03-10 PROCEDURE — 99214 OFFICE O/P EST MOD 30 MIN: CPT | Performed by: NURSE PRACTITIONER

## 2022-03-10 NOTE — PATIENT INSTRUCTIONS
Lab orders entered for April. Continue current medications. Urine culture  pending. Follow up for wellness over the summer. Otherwise follow up as needed.

## 2022-03-10 NOTE — TELEPHONE ENCOUNTER
----- Message from VON Bates sent at 3/10/2022  4:47 PM CST -----  Urine was negative for blood on urinalysis. Note to MyChart.

## 2022-03-11 ENCOUNTER — TELEPHONE (OUTPATIENT)
Dept: FAMILY MEDICINE CLINIC | Facility: CLINIC | Age: 77
End: 2022-03-11

## 2022-03-11 NOTE — TELEPHONE ENCOUNTER
----- Message from VON Anglin sent at 3/11/2022  4:03 PM CST -----  Please let patient know the urine culture is negative. Thank you.

## 2022-03-25 NOTE — TELEPHONE ENCOUNTER
Dr. Auguste Goes out of the office. Total lipid panel done through [facility] in 05/2020. There are no Wet Read(s) to document.

## 2022-04-14 ENCOUNTER — LABORATORY ENCOUNTER (OUTPATIENT)
Dept: LAB | Age: 77
End: 2022-04-14
Attending: NURSE PRACTITIONER
Payer: MEDICARE

## 2022-04-14 DIAGNOSIS — I10 ESSENTIAL HYPERTENSION: ICD-10-CM

## 2022-04-14 DIAGNOSIS — R97.20 ELEVATED PROSTATE SPECIFIC ANTIGEN (PSA): ICD-10-CM

## 2022-04-14 DIAGNOSIS — C61 CARCINOMA OF PROSTATE (HCC): ICD-10-CM

## 2022-04-14 DIAGNOSIS — E11.9 TYPE 2 DIABETES MELLITUS WITHOUT COMPLICATION, WITHOUT LONG-TERM CURRENT USE OF INSULIN (HCC): ICD-10-CM

## 2022-04-14 LAB
ALBUMIN SERPL-MCNC: 4 G/DL (ref 3.4–5)
ALBUMIN/GLOB SERPL: 1.4 {RATIO} (ref 1–2)
ALP LIVER SERPL-CCNC: 93 U/L
ALT SERPL-CCNC: 31 U/L
ANION GAP SERPL CALC-SCNC: 3 MMOL/L (ref 0–18)
AST SERPL-CCNC: 22 U/L (ref 15–37)
BILIRUB SERPL-MCNC: 1 MG/DL (ref 0.1–2)
BUN BLD-MCNC: 16 MG/DL (ref 7–18)
CALCIUM BLD-MCNC: 9.4 MG/DL (ref 8.5–10.1)
CHLORIDE SERPL-SCNC: 106 MMOL/L (ref 98–112)
CO2 SERPL-SCNC: 32 MMOL/L (ref 21–32)
CREAT BLD-MCNC: 0.92 MG/DL
EST. AVERAGE GLUCOSE BLD GHB EST-MCNC: 123 MG/DL (ref 68–126)
FASTING STATUS PATIENT QL REPORTED: YES
GLOBULIN PLAS-MCNC: 2.9 G/DL (ref 2.8–4.4)
GLUCOSE BLD-MCNC: 101 MG/DL (ref 70–99)
HBA1C MFR BLD: 5.9 % (ref ?–5.7)
OSMOLALITY SERPL CALC.SUM OF ELEC: 293 MOSM/KG (ref 275–295)
POTASSIUM SERPL-SCNC: 4.2 MMOL/L (ref 3.5–5.1)
PROT SERPL-MCNC: 6.9 G/DL (ref 6.4–8.2)
PSA SERPL-MCNC: 1.34 NG/ML (ref ?–4)
SODIUM SERPL-SCNC: 141 MMOL/L (ref 136–145)

## 2022-04-14 PROCEDURE — 80053 COMPREHEN METABOLIC PANEL: CPT

## 2022-04-14 PROCEDURE — 84153 ASSAY OF PSA TOTAL: CPT

## 2022-04-14 PROCEDURE — 83036 HEMOGLOBIN GLYCOSYLATED A1C: CPT

## 2022-04-14 PROCEDURE — 36415 COLL VENOUS BLD VENIPUNCTURE: CPT

## 2022-04-15 ENCOUNTER — TELEPHONE (OUTPATIENT)
Dept: FAMILY MEDICINE CLINIC | Facility: CLINIC | Age: 77
End: 2022-04-15

## 2022-04-15 NOTE — TELEPHONE ENCOUNTER
----- Message from VON Mancsuo sent at 4/15/2022  9:59 AM CDT -----  Please let patient know that his labs look great. A1c has come down nicely. Continue current treatment plan. Note to MyChart.

## 2022-04-22 ENCOUNTER — HOSPITAL ENCOUNTER (OUTPATIENT)
Dept: MRI IMAGING | Age: 77
Discharge: HOME | End: 2022-04-22
Attending: UROLOGY

## 2022-04-22 DIAGNOSIS — R97.21 RISING PSA FOLLOWING TREATMENT FOR MALIGNANT NEOPLASM OF PROSTATE: ICD-10-CM

## 2022-04-22 PROCEDURE — 10002805 HB CONTRAST AGENT: Performed by: UROLOGY

## 2022-04-22 PROCEDURE — G1004 CDSM NDSC: HCPCS

## 2022-04-22 PROCEDURE — A9577 INJ MULTIHANCE: HCPCS | Performed by: UROLOGY

## 2022-04-22 PROCEDURE — 72197 MRI PELVIS W/O & W/DYE: CPT

## 2022-04-22 RX ADMIN — GADOBENATE DIMEGLUMINE 20 ML: 529 INJECTION, SOLUTION INTRAVENOUS at 10:59

## 2022-04-29 ENCOUNTER — PATIENT OUTREACH (OUTPATIENT)
Dept: FAMILY MEDICINE CLINIC | Facility: CLINIC | Age: 77
End: 2022-04-29

## 2022-05-09 ENCOUNTER — TELEPHONE (OUTPATIENT)
Dept: FAMILY MEDICINE CLINIC | Facility: CLINIC | Age: 77
End: 2022-05-09

## 2022-05-09 NOTE — TELEPHONE ENCOUNTER
Patient needs an appt with MD for pre-op clearance. Sent mcms to patient to call office to schedule appt.

## 2022-05-10 ENCOUNTER — TELEPHONE (OUTPATIENT)
Dept: FAMILY MEDICINE CLINIC | Facility: CLINIC | Age: 77
End: 2022-05-10

## 2022-05-10 NOTE — TELEPHONE ENCOUNTER
pt was contacted to make appt for sleep follow up- was just here in march for sleep appt- wants to know if this is necessary since he was just here and santos can see downloads electronically

## 2022-05-10 NOTE — TELEPHONE ENCOUNTER
Patient was last seen for sleep 3/8/21. Spoke with pt. Scheduled sleep follow up appointment.   Future Appointments   Date Time Provider Leeann Ray   5/27/2022 10:30 AM Esvin Franco APRN [facility] [place]   6/10/2022 11:00 AM Esvin Franco APRN [facility] [place]   6/22/2022  4:30 PM Gasper Delarosa MD [facility] [place]

## 2022-05-10 NOTE — TELEPHONE ENCOUNTER
pt has appt for pre-op with dr Francisco Ramirez on 6/22- wants to make sure his pre-op notes, ekg , and labs are also shared with dr taking care of his aortic aneurysm- dr Reyna Ramos, Phone [phone]- fax [phone]

## 2022-05-10 NOTE — TELEPHONE ENCOUNTER
Future Appointments   Date Time Provider [facility] [place]   5/27/2022 10:30 AM Robinson Franco APRN [facility] [place]   6/10/2022 11:00 AM Robinson Franco APRN [facility] [place]   6/22/2022  4:30 PM Oumar Sarkar MD [facility] [place]

## 2022-05-27 ENCOUNTER — OFFICE VISIT (OUTPATIENT)
Dept: FAMILY MEDICINE CLINIC | Facility: CLINIC | Age: 77
End: 2022-05-27
Payer: MEDICARE

## 2022-05-27 VITALS
WEIGHT: 252 LBS | SYSTOLIC BLOOD PRESSURE: 118 MMHG | TEMPERATURE: 98 F | HEIGHT: 71.5 IN | DIASTOLIC BLOOD PRESSURE: 64 MMHG | HEART RATE: 68 BPM | BODY MASS INDEX: 34.51 KG/M2 | OXYGEN SATURATION: 97 % | RESPIRATION RATE: 16 BRPM

## 2022-05-27 DIAGNOSIS — I10 ESSENTIAL HYPERTENSION: ICD-10-CM

## 2022-05-27 DIAGNOSIS — E11.9 TYPE 2 DIABETES MELLITUS WITHOUT COMPLICATION, WITHOUT LONG-TERM CURRENT USE OF INSULIN (HCC): ICD-10-CM

## 2022-05-27 DIAGNOSIS — Z13.6 SCREENING FOR CARDIOVASCULAR CONDITION: ICD-10-CM

## 2022-05-27 DIAGNOSIS — E78.00 PURE HYPERCHOLESTEROLEMIA: ICD-10-CM

## 2022-05-27 DIAGNOSIS — Z00.00 ENCOUNTER FOR ANNUAL HEALTH EXAMINATION: Primary | ICD-10-CM

## 2022-05-27 PROCEDURE — G0439 PPPS, SUBSEQ VISIT: HCPCS | Performed by: NURSE PRACTITIONER

## 2022-05-27 RX ORDER — OLMESARTAN MEDOXOMIL AND HYDROCHLOROTHIAZIDE 40/12.5 40; 12.5 MG/1; MG/1
1 TABLET ORAL DAILY
Qty: 90 TABLET | Refills: 1 | Status: SHIPPED | OUTPATIENT
Start: 2022-05-27

## 2022-05-27 RX ORDER — CLONIDINE HYDROCHLORIDE 0.1 MG/1
0.1 TABLET ORAL 3 TIMES DAILY
Qty: 270 TABLET | Refills: 1 | Status: SHIPPED | OUTPATIENT
Start: 2022-05-27

## 2022-05-27 RX ORDER — ALLOPURINOL 300 MG/1
300 TABLET ORAL DAILY
Qty: 90 TABLET | Refills: 1 | Status: SHIPPED | OUTPATIENT
Start: 2022-05-27

## 2022-05-27 RX ORDER — ATORVASTATIN CALCIUM 20 MG/1
20 TABLET, FILM COATED ORAL EVERY EVENING
Qty: 90 TABLET | Refills: 1 | Status: SHIPPED | OUTPATIENT
Start: 2022-05-27

## 2022-06-10 ENCOUNTER — OFFICE VISIT (OUTPATIENT)
Dept: FAMILY MEDICINE CLINIC | Facility: CLINIC | Age: 77
End: 2022-06-10
Payer: MEDICARE

## 2022-06-10 VITALS
TEMPERATURE: 97 F | HEIGHT: 71.5 IN | HEART RATE: 69 BPM | DIASTOLIC BLOOD PRESSURE: 88 MMHG | SYSTOLIC BLOOD PRESSURE: 136 MMHG | OXYGEN SATURATION: 98 % | WEIGHT: 253.19 LBS | BODY MASS INDEX: 34.67 KG/M2 | RESPIRATION RATE: 18 BRPM

## 2022-06-10 DIAGNOSIS — G47.33 OBSTRUCTIVE SLEEP APNEA: Primary | ICD-10-CM

## 2022-06-10 PROCEDURE — 99214 OFFICE O/P EST MOD 30 MIN: CPT | Performed by: NURSE PRACTITIONER

## 2022-06-22 ENCOUNTER — OFFICE VISIT (OUTPATIENT)
Dept: FAMILY MEDICINE CLINIC | Facility: CLINIC | Age: 77
End: 2022-06-22
Payer: MEDICARE

## 2022-06-22 VITALS
SYSTOLIC BLOOD PRESSURE: 110 MMHG | BODY MASS INDEX: 35.19 KG/M2 | TEMPERATURE: 98 F | RESPIRATION RATE: 18 BRPM | WEIGHT: 257 LBS | DIASTOLIC BLOOD PRESSURE: 74 MMHG | HEIGHT: 71.5 IN | OXYGEN SATURATION: 99 % | HEART RATE: 67 BPM

## 2022-06-22 DIAGNOSIS — C61 PROSTATE CANCER (HCC): ICD-10-CM

## 2022-06-22 DIAGNOSIS — Z01.818 PREOPERATIVE EXAMINATION: Primary | ICD-10-CM

## 2022-06-22 DIAGNOSIS — I71.2 THORACIC ASCENDING AORTIC ANEURYSM (HCC): ICD-10-CM

## 2022-06-22 DIAGNOSIS — K64.4 HEMORRHOIDS, EXTERNAL: ICD-10-CM

## 2022-06-22 DIAGNOSIS — I10 PRIMARY HYPERTENSION: ICD-10-CM

## 2022-06-22 PROBLEM — R73.03 PREDIABETES: Status: ACTIVE | Noted: 2021-05-18

## 2022-06-22 LAB
ALBUMIN SERPL-MCNC: 3.9 G/DL (ref 3.4–5)
ALBUMIN/GLOB SERPL: 1.3 {RATIO} (ref 1–2)
ALP LIVER SERPL-CCNC: 94 U/L
ALT SERPL-CCNC: 32 U/L
ANION GAP SERPL CALC-SCNC: 4 MMOL/L (ref 0–18)
AST SERPL-CCNC: 18 U/L (ref 15–37)
BASOPHILS # BLD AUTO: 0.04 X10(3) UL (ref 0–0.2)
BASOPHILS NFR BLD AUTO: 0.6 %
BILIRUB SERPL-MCNC: 0.5 MG/DL (ref 0.1–2)
BUN BLD-MCNC: 16 MG/DL (ref 7–18)
CALCIUM BLD-MCNC: 10 MG/DL (ref 8.5–10.1)
CHLORIDE SERPL-SCNC: 104 MMOL/L (ref 98–112)
CO2 SERPL-SCNC: 32 MMOL/L (ref 21–32)
CREAT BLD-MCNC: 1.04 MG/DL
EOSINOPHIL # BLD AUTO: 0.33 X10(3) UL (ref 0–0.7)
EOSINOPHIL NFR BLD AUTO: 4.9 %
ERYTHROCYTE [DISTWIDTH] IN BLOOD BY AUTOMATED COUNT: 13.8 %
FASTING STATUS PATIENT QL REPORTED: NO
GLOBULIN PLAS-MCNC: 3.1 G/DL (ref 2.8–4.4)
GLUCOSE BLD-MCNC: 86 MG/DL (ref 70–99)
HCT VFR BLD AUTO: 42 %
HGB BLD-MCNC: 13.8 G/DL
IMM GRANULOCYTES # BLD AUTO: 0.01 X10(3) UL (ref 0–1)
IMM GRANULOCYTES NFR BLD: 0.1 %
LYMPHOCYTES # BLD AUTO: 0.92 X10(3) UL (ref 1–4)
LYMPHOCYTES NFR BLD AUTO: 13.8 %
MCH RBC QN AUTO: 31.4 PG (ref 26–34)
MCHC RBC AUTO-ENTMCNC: 32.9 G/DL (ref 31–37)
MCV RBC AUTO: 95.5 FL
MONOCYTES # BLD AUTO: 0.63 X10(3) UL (ref 0.1–1)
MONOCYTES NFR BLD AUTO: 9.4 %
NEUTROPHILS # BLD AUTO: 4.75 X10 (3) UL (ref 1.5–7.7)
NEUTROPHILS # BLD AUTO: 4.75 X10(3) UL (ref 1.5–7.7)
NEUTROPHILS NFR BLD AUTO: 71.2 %
OSMOLALITY SERPL CALC.SUM OF ELEC: 290 MOSM/KG (ref 275–295)
PLATELET # BLD AUTO: 176 10(3)UL (ref 150–450)
POTASSIUM SERPL-SCNC: 3.9 MMOL/L (ref 3.5–5.1)
PROT SERPL-MCNC: 7 G/DL (ref 6.4–8.2)
RBC # BLD AUTO: 4.4 X10(6)UL
SODIUM SERPL-SCNC: 140 MMOL/L (ref 136–145)
WBC # BLD AUTO: 6.7 X10(3) UL (ref 4–11)

## 2022-06-22 PROCEDURE — 93000 ELECTROCARDIOGRAM COMPLETE: CPT | Performed by: FAMILY MEDICINE

## 2022-06-22 PROCEDURE — 99214 OFFICE O/P EST MOD 30 MIN: CPT | Performed by: FAMILY MEDICINE

## 2022-06-22 PROCEDURE — 80053 COMPREHEN METABOLIC PANEL: CPT | Performed by: FAMILY MEDICINE

## 2022-06-22 PROCEDURE — 85025 COMPLETE CBC W/AUTO DIFF WBC: CPT | Performed by: FAMILY MEDICINE

## 2022-06-22 NOTE — PATIENT INSTRUCTIONS
After today's assessment  patient is at optimum health for surgery and relatively at low risk. There are no contraindication for procedure. Will have cardiology clearance with Dr Enrico Ontiveros. Recommend to avoid any use of aleve, aspirin or ibuprofen 1 week before surgery. Stop multivitamins and fish oil 1 week before procedure. Monitor blood pressure.

## 2022-07-12 RX ORDER — ATORVASTATIN CALCIUM 20 MG/1
20 TABLET, FILM COATED ORAL EVERY EVENING
COMMUNITY

## 2022-07-12 RX ORDER — CLONIDINE HYDROCHLORIDE 0.1 MG/1
0.1 TABLET ORAL 3 TIMES DAILY
COMMUNITY

## 2022-07-12 RX ORDER — ALLOPURINOL 300 MG/1
300 TABLET ORAL EVERY MORNING
COMMUNITY

## 2022-07-12 ASSESSMENT — ACTIVITIES OF DAILY LIVING (ADL)
HISTORY OF FALLING IN THE LAST YEAR (PRIOR TO ADMISSION): YES
ADL_SHORT_OF_BREATH: NO
SENSORY_SUPPORT_DEVICES: EYEGLASSES

## 2022-07-14 ENCOUNTER — TELEPHONE (OUTPATIENT)
Dept: FAMILY MEDICINE CLINIC | Facility: CLINIC | Age: 77
End: 2022-07-14

## 2022-07-15 ENCOUNTER — LAB SERVICES (OUTPATIENT)
Dept: LAB | Age: 77
End: 2022-07-15

## 2022-07-15 DIAGNOSIS — Z01.812 PRE-PROCEDURAL LABORATORY EXAMINATION: ICD-10-CM

## 2022-07-15 PROCEDURE — U0003 INFECTIOUS AGENT DETECTION BY NUCLEIC ACID (DNA OR RNA); SEVERE ACUTE RESPIRATORY SYNDROME CORONAVIRUS 2 (SARS-COV-2) (CORONAVIRUS DISEASE [COVID-19]), AMPLIFIED PROBE TECHNIQUE, MAKING USE OF HIGH THROUGHPUT TECHNOLOGIES AS DESCRIBED BY CMS-2020-01-R: HCPCS

## 2022-07-16 LAB
SARS-COV-2 RNA RESP QL NAA+PROBE: NOT DETECTED
SERVICE CMNT-IMP: NORMAL
SERVICE CMNT-IMP: NORMAL

## 2022-07-18 ENCOUNTER — HOSPITAL ENCOUNTER (OUTPATIENT)
Age: 77
Discharge: HOME | End: 2022-07-18
Attending: UROLOGY | Admitting: UROLOGY

## 2022-07-18 ENCOUNTER — ANESTHESIA (OUTPATIENT)
Dept: SURGERY | Age: 77
End: 2022-07-18

## 2022-07-18 ENCOUNTER — ANESTHESIA EVENT (OUTPATIENT)
Dept: SURGERY | Age: 77
End: 2022-07-18

## 2022-07-18 VITALS
TEMPERATURE: 97.6 F | HEIGHT: 71 IN | SYSTOLIC BLOOD PRESSURE: 151 MMHG | RESPIRATION RATE: 15 BRPM | WEIGHT: 256.39 LBS | HEART RATE: 63 BPM | DIASTOLIC BLOOD PRESSURE: 108 MMHG | OXYGEN SATURATION: 97 % | BODY MASS INDEX: 35.9 KG/M2

## 2022-07-18 DIAGNOSIS — Z01.812 PRE-PROCEDURAL LABORATORY EXAMINATION: Primary | ICD-10-CM

## 2022-07-18 PROBLEM — C61 PROSTATE CANCER (CMD): Status: ACTIVE | Noted: 2022-07-18

## 2022-07-18 PROBLEM — R97.21 RISING PSA FOLLOWING TREATMENT FOR MALIGNANT NEOPLASM OF PROSTATE: Status: ACTIVE | Noted: 2022-07-18

## 2022-07-18 PROCEDURE — 13000007 HB ANESTHESIA MAC EA ADD MINUTE: Performed by: UROLOGY

## 2022-07-18 PROCEDURE — 13000006 HB ANESTHESIA MAC S/U + 1ST 15 MIN: Performed by: UROLOGY

## 2022-07-18 PROCEDURE — 10004452 HB PACU ADDL 30 MINUTES: Performed by: UROLOGY

## 2022-07-18 PROCEDURE — 88341 IMHCHEM/IMCYTCHM EA ADD ANTB: CPT | Performed by: UROLOGY

## 2022-07-18 PROCEDURE — 13000001 HB PHASE II RECOVERY EA 30 MINUTES: Performed by: UROLOGY

## 2022-07-18 PROCEDURE — 10002800 HB RX 250 W HCPCS: Performed by: NURSE ANESTHETIST, CERTIFIED REGISTERED

## 2022-07-18 PROCEDURE — 10004451 HB PACU RECOVERY 1ST 30 MINUTES: Performed by: UROLOGY

## 2022-07-18 PROCEDURE — 13000035 HB BASIC CASE EA ADD MINUTE: Performed by: UROLOGY

## 2022-07-18 PROCEDURE — 10002800 HB RX 250 W HCPCS: Performed by: UROLOGY

## 2022-07-18 PROCEDURE — 10002807 HB RX 258: Performed by: NURSE ANESTHETIST, CERTIFIED REGISTERED

## 2022-07-18 PROCEDURE — 88342 IMHCHEM/IMCYTCHM 1ST ANTB: CPT | Performed by: UROLOGY

## 2022-07-18 PROCEDURE — 10002807 HB RX 258: Performed by: ANESTHESIOLOGY

## 2022-07-18 PROCEDURE — 13000034 HB BASIC CASE  S/U +1ST 15 MIN: Performed by: UROLOGY

## 2022-07-18 PROCEDURE — 10002801 HB RX 250 W/O HCPCS: Performed by: NURSE ANESTHETIST, CERTIFIED REGISTERED

## 2022-07-18 RX ORDER — NICOTINE POLACRILEX 4 MG
30 LOZENGE BUCCAL
Status: DISCONTINUED | OUTPATIENT
Start: 2022-07-18 | End: 2022-07-18 | Stop reason: HOSPADM

## 2022-07-18 RX ORDER — CHLORAL HYDRATE 500 MG
1000 CAPSULE ORAL 2 TIMES DAILY
COMMUNITY

## 2022-07-18 RX ORDER — CEFAZOLIN SODIUM/WATER 2 G/20 ML
2000 SYRINGE (ML) INTRAVENOUS ONCE
Status: COMPLETED | OUTPATIENT
Start: 2022-07-18 | End: 2022-07-18

## 2022-07-18 RX ORDER — DEXTROSE MONOHYDRATE 50 MG/ML
INJECTION, SOLUTION INTRAVENOUS CONTINUOUS PRN
Status: DISCONTINUED | OUTPATIENT
Start: 2022-07-18 | End: 2022-07-18 | Stop reason: HOSPADM

## 2022-07-18 RX ORDER — SODIUM CHLORIDE, SODIUM LACTATE, POTASSIUM CHLORIDE, CALCIUM CHLORIDE 600; 310; 30; 20 MG/100ML; MG/100ML; MG/100ML; MG/100ML
INJECTION, SOLUTION INTRAVENOUS CONTINUOUS
Status: DISCONTINUED | OUTPATIENT
Start: 2022-07-18 | End: 2022-07-18 | Stop reason: HOSPADM

## 2022-07-18 RX ORDER — LIDOCAINE HYDROCHLORIDE 10 MG/ML
5-10 INJECTION, SOLUTION INFILTRATION; PERINEURAL PRN
Status: DISCONTINUED | OUTPATIENT
Start: 2022-07-18 | End: 2022-07-18 | Stop reason: HOSPADM

## 2022-07-18 RX ORDER — ACETAMINOPHEN 160 MG
50 TABLET,DISINTEGRATING ORAL 3 TIMES DAILY
COMMUNITY

## 2022-07-18 RX ORDER — LIDOCAINE HYDROCHLORIDE 20 MG/ML
INJECTION, SOLUTION INFILTRATION; PERINEURAL PRN
Status: DISCONTINUED | OUTPATIENT
Start: 2022-07-18 | End: 2022-07-18

## 2022-07-18 RX ORDER — VITAMIN B COMPLEX
100 TABLET ORAL 2 TIMES DAILY
COMMUNITY

## 2022-07-18 RX ORDER — PROPOFOL 10 MG/ML
INJECTION, EMULSION INTRAVENOUS PRN
Status: DISCONTINUED | OUTPATIENT
Start: 2022-07-18 | End: 2022-07-18

## 2022-07-18 RX ORDER — 0.9 % SODIUM CHLORIDE 0.9 %
2 VIAL (ML) INJECTION EVERY 12 HOURS SCHEDULED
Status: DISCONTINUED | OUTPATIENT
Start: 2022-07-18 | End: 2022-07-18 | Stop reason: HOSPADM

## 2022-07-18 RX ORDER — HUMAN INSULIN 100 [IU]/ML
INJECTION, SOLUTION SUBCUTANEOUS
Status: DISCONTINUED | OUTPATIENT
Start: 2022-07-18 | End: 2022-07-18 | Stop reason: HOSPADM

## 2022-07-18 RX ORDER — DEXTROSE MONOHYDRATE 25 G/50ML
25 INJECTION, SOLUTION INTRAVENOUS PRN
Status: DISCONTINUED | OUTPATIENT
Start: 2022-07-18 | End: 2022-07-18 | Stop reason: HOSPADM

## 2022-07-18 RX ORDER — SODIUM CHLORIDE, SODIUM LACTATE, POTASSIUM CHLORIDE, CALCIUM CHLORIDE 600; 310; 30; 20 MG/100ML; MG/100ML; MG/100ML; MG/100ML
INJECTION, SOLUTION INTRAVENOUS CONTINUOUS PRN
Status: DISCONTINUED | OUTPATIENT
Start: 2022-07-18 | End: 2022-07-18

## 2022-07-18 RX ORDER — LANOLIN ALCOHOL/MO/W.PET/CERES
1000 CREAM (GRAM) TOPICAL DAILY
COMMUNITY

## 2022-07-18 RX ORDER — 0.9 % SODIUM CHLORIDE 0.9 %
2 VIAL (ML) INJECTION EVERY 12 HOURS SCHEDULED
Status: CANCELLED | OUTPATIENT
Start: 2022-07-18

## 2022-07-18 RX ORDER — SODIUM CHLORIDE 9 MG/ML
INJECTION, SOLUTION INTRAVENOUS CONTINUOUS
Status: DISCONTINUED | OUTPATIENT
Start: 2022-07-18 | End: 2022-07-18 | Stop reason: HOSPADM

## 2022-07-18 RX ADMIN — PROPOFOL 50 MG: 10 INJECTION, EMULSION INTRAVENOUS at 10:19

## 2022-07-18 RX ADMIN — PROPOFOL 60 MG: 10 INJECTION, EMULSION INTRAVENOUS at 10:12

## 2022-07-18 RX ADMIN — SODIUM CHLORIDE, POTASSIUM CHLORIDE, SODIUM LACTATE AND CALCIUM CHLORIDE: 600; 310; 30; 20 INJECTION, SOLUTION INTRAVENOUS at 10:08

## 2022-07-18 RX ADMIN — PROPOFOL 100 MCG/KG/MIN: 10 INJECTION, EMULSION INTRAVENOUS at 10:12

## 2022-07-18 RX ADMIN — PROPOFOL 40 MG: 10 INJECTION, EMULSION INTRAVENOUS at 10:15

## 2022-07-18 RX ADMIN — SODIUM CHLORIDE, POTASSIUM CHLORIDE, SODIUM LACTATE AND CALCIUM CHLORIDE: 600; 310; 30; 20 INJECTION, SOLUTION INTRAVENOUS at 09:26

## 2022-07-18 RX ADMIN — Medication 2000 MG: at 10:15

## 2022-07-18 RX ADMIN — LIDOCAINE HYDROCHLORIDE 100 MG: 20 INJECTION, SOLUTION INFILTRATION; PERINEURAL at 10:12

## 2022-07-18 ASSESSMENT — ENCOUNTER SYMPTOMS: EXERCISE TOLERANCE: GOOD (>4 METS)

## 2022-07-18 ASSESSMENT — PAIN SCALES - GENERAL
PAINLEVEL_OUTOF10: 1
PAINLEVEL_OUTOF10: 0
PAINLEVEL_OUTOF10: 0

## 2022-07-21 LAB
ASR DISCLAIMER: NORMAL
CASE RPRT: NORMAL
CLINICAL INFO: NORMAL
PATH REPORT.FINAL DX SPEC: NORMAL
PATH REPORT.FINAL DX SPEC: NORMAL
PATH REPORT.GROSS SPEC: NORMAL

## 2022-07-27 ENCOUNTER — HOSPITAL ENCOUNTER (OUTPATIENT)
Dept: GENERAL RADIOLOGY | Age: 77
Discharge: HOME | End: 2022-07-27
Attending: NURSE PRACTITIONER
Payer: MEDICARE

## 2022-07-27 ENCOUNTER — TELEPHONE (OUTPATIENT)
Dept: FAMILY MEDICINE CLINIC | Facility: CLINIC | Age: 77
End: 2022-07-27

## 2022-07-27 ENCOUNTER — HOSPITAL ENCOUNTER (EMERGENCY)
Age: 77
Discharge: HOME | End: 2022-07-27
Attending: EMERGENCY MEDICINE

## 2022-07-27 ENCOUNTER — OFFICE VISIT (OUTPATIENT)
Dept: FAMILY MEDICINE CLINIC | Facility: CLINIC | Age: 77
End: 2022-07-27
Payer: MEDICARE

## 2022-07-27 ENCOUNTER — APPOINTMENT (OUTPATIENT)
Dept: CT IMAGING | Age: 77
End: 2022-07-27

## 2022-07-27 VITALS
DIASTOLIC BLOOD PRESSURE: 94 MMHG | BODY MASS INDEX: 36.43 KG/M2 | WEIGHT: 266 LBS | HEART RATE: 81 BPM | TEMPERATURE: 97 F | HEIGHT: 71.5 IN | OXYGEN SATURATION: 99 % | SYSTOLIC BLOOD PRESSURE: 154 MMHG | RESPIRATION RATE: 18 BRPM

## 2022-07-27 VITALS
WEIGHT: 266.87 LBS | DIASTOLIC BLOOD PRESSURE: 92 MMHG | BODY MASS INDEX: 37.36 KG/M2 | TEMPERATURE: 98.7 F | HEART RATE: 81 BPM | RESPIRATION RATE: 16 BRPM | OXYGEN SATURATION: 96 % | HEIGHT: 71 IN | SYSTOLIC BLOOD PRESSURE: 146 MMHG

## 2022-07-27 DIAGNOSIS — C61 MALIGNANT NEOPLASM OF PROSTATE (CMD): ICD-10-CM

## 2022-07-27 DIAGNOSIS — C61 PROSTATE CANCER (HCC): ICD-10-CM

## 2022-07-27 DIAGNOSIS — R11.0 NAUSEA: ICD-10-CM

## 2022-07-27 DIAGNOSIS — N13.30 HYDRONEPHROSIS, UNSPECIFIED HYDRONEPHROSIS TYPE: ICD-10-CM

## 2022-07-27 DIAGNOSIS — K59.00 CONSTIPATION, ACUTE: ICD-10-CM

## 2022-07-27 DIAGNOSIS — R33.8 ACUTE URINARY RETENTION: Primary | ICD-10-CM

## 2022-07-27 DIAGNOSIS — K59.00 CONSTIPATION, UNSPECIFIED CONSTIPATION TYPE: ICD-10-CM

## 2022-07-27 DIAGNOSIS — K59.00 CONSTIPATION, UNSPECIFIED CONSTIPATION TYPE: Primary | ICD-10-CM

## 2022-07-27 PROBLEM — I71.2 ASCENDING AORTIC ANEURYSM (HCC): Status: ACTIVE | Noted: 2021-12-06

## 2022-07-27 PROBLEM — I71.2 ASCENDING AORTIC ANEURYSM: Status: ACTIVE | Noted: 2021-12-06

## 2022-07-27 PROBLEM — I71.21 ASCENDING AORTIC ANEURYSM (HCC): Status: ACTIVE | Noted: 2021-12-06

## 2022-07-27 PROBLEM — I71.21 ASCENDING AORTIC ANEURYSM: Status: ACTIVE | Noted: 2021-12-06

## 2022-07-27 LAB
ALBUMIN SERPL-MCNC: 3.8 G/DL (ref 3.6–5.1)
ALBUMIN/GLOB SERPL: 1 {RATIO} (ref 1–2.4)
ALP SERPL-CCNC: 87 UNITS/L (ref 45–117)
ALT SERPL-CCNC: 33 UNITS/L
ANION GAP SERPL CALC-SCNC: 18 MMOL/L (ref 7–19)
APPEARANCE UR: CLEAR
APTT PPP: 24 SEC (ref 22–30)
AST SERPL-CCNC: 21 UNITS/L
BACTERIA #/AREA URNS HPF: ABNORMAL /HPF
BASOPHILS # BLD: 0 K/MCL (ref 0–0.3)
BASOPHILS NFR BLD: 0 %
BILIRUB SERPL-MCNC: 1 MG/DL (ref 0.2–1)
BILIRUB UR QL STRIP: NEGATIVE
BUN SERPL-MCNC: 56 MG/DL (ref 6–20)
BUN/CREAT SERPL: 13 (ref 7–25)
CALCIUM SERPL-MCNC: 9.4 MG/DL (ref 8.4–10.2)
CHLORIDE SERPL-SCNC: 94 MMOL/L (ref 97–110)
CO2 SERPL-SCNC: 24 MMOL/L (ref 21–32)
COLOR UR: YELLOW
CREAT SERPL-MCNC: 4.47 MG/DL (ref 0.67–1.17)
DEPRECATED RDW RBC: 47.8 FL (ref 39–50)
EOSINOPHIL # BLD: 0 K/MCL (ref 0–0.5)
EOSINOPHIL NFR BLD: 0 %
ERYTHROCYTE [DISTWIDTH] IN BLOOD: 13.9 % (ref 11–15)
FASTING DURATION TIME PATIENT: ABNORMAL H
GFR SERPLBLD BASED ON 1.73 SQ M-ARVRAT: 13 ML/MIN
GLOBULIN SER-MCNC: 3.9 G/DL (ref 2–4)
GLUCOSE SERPL-MCNC: 107 MG/DL (ref 70–99)
GLUCOSE UR STRIP-MCNC: NEGATIVE MG/DL
HCT VFR BLD CALC: 42.5 % (ref 39–51)
HGB BLD-MCNC: 14.4 G/DL (ref 13–17)
HGB UR QL STRIP: ABNORMAL
HYALINE CASTS #/AREA URNS LPF: ABNORMAL /LPF
IMM GRANULOCYTES # BLD AUTO: 0.1 K/MCL (ref 0–0.2)
IMM GRANULOCYTES # BLD: 0 %
INR PPP: 1
KETONES UR STRIP-MCNC: NEGATIVE MG/DL
LEUKOCYTE ESTERASE UR QL STRIP: NEGATIVE
LIPASE SERPL-CCNC: 179 UNITS/L (ref 73–393)
LYMPHOCYTES # BLD: 0.8 K/MCL (ref 1–4)
LYMPHOCYTES NFR BLD: 5 %
MCH RBC QN AUTO: 31.6 PG (ref 26–34)
MCHC RBC AUTO-ENTMCNC: 33.9 G/DL (ref 32–36.5)
MCV RBC AUTO: 93.4 FL (ref 78–100)
MONOCYTES # BLD: 1.3 K/MCL (ref 0.3–0.9)
MONOCYTES NFR BLD: 8 %
NEUTROPHILS # BLD: 14.2 K/MCL (ref 1.8–7.7)
NEUTROPHILS NFR BLD: 87 %
NITRITE UR QL STRIP: NEGATIVE
NRBC BLD MANUAL-RTO: 0 /100 WBC
PH UR STRIP: 5.5 [PH] (ref 5–7)
PLATELET # BLD AUTO: 211 K/MCL (ref 140–450)
POTASSIUM SERPL-SCNC: 4.1 MMOL/L (ref 3.4–5.1)
PROT SERPL-MCNC: 7.7 G/DL (ref 6.4–8.2)
PROT UR STRIP-MCNC: NEGATIVE MG/DL
PROTHROMBIN TIME: 10.5 SEC (ref 9.7–11.8)
RBC # BLD: 4.55 MIL/MCL (ref 4.5–5.9)
RBC #/AREA URNS HPF: ABNORMAL /HPF
SODIUM SERPL-SCNC: 132 MMOL/L (ref 135–145)
SP GR UR STRIP: 1.01 (ref 1–1.03)
SQUAMOUS #/AREA URNS HPF: ABNORMAL /HPF
UROBILINOGEN UR STRIP-MCNC: 0.2 MG/DL
WBC # BLD: 16.4 K/MCL (ref 4.2–11)
WBC #/AREA URNS HPF: ABNORMAL /HPF

## 2022-07-27 PROCEDURE — 85610 PROTHROMBIN TIME: CPT | Performed by: EMERGENCY MEDICINE

## 2022-07-27 PROCEDURE — 96375 TX/PRO/DX INJ NEW DRUG ADDON: CPT

## 2022-07-27 PROCEDURE — 74019 RADEX ABDOMEN 2 VIEWS: CPT | Performed by: NURSE PRACTITIONER

## 2022-07-27 PROCEDURE — 80053 COMPREHEN METABOLIC PANEL: CPT | Performed by: PHYSICIAN ASSISTANT

## 2022-07-27 PROCEDURE — 99284 EMERGENCY DEPT VISIT MOD MDM: CPT

## 2022-07-27 PROCEDURE — 10002807 HB RX 258: Performed by: PHYSICIAN ASSISTANT

## 2022-07-27 PROCEDURE — 51702 INSERT TEMP BLADDER CATH: CPT

## 2022-07-27 PROCEDURE — 81001 URINALYSIS AUTO W/SCOPE: CPT | Performed by: PHYSICIAN ASSISTANT

## 2022-07-27 PROCEDURE — 96374 THER/PROPH/DIAG INJ IV PUSH: CPT

## 2022-07-27 PROCEDURE — 85025 COMPLETE CBC W/AUTO DIFF WBC: CPT | Performed by: PHYSICIAN ASSISTANT

## 2022-07-27 PROCEDURE — 74176 CT ABD & PELVIS W/O CONTRAST: CPT

## 2022-07-27 PROCEDURE — 51798 US URINE CAPACITY MEASURE: CPT | Performed by: EMERGENCY MEDICINE

## 2022-07-27 PROCEDURE — 83690 ASSAY OF LIPASE: CPT | Performed by: PHYSICIAN ASSISTANT

## 2022-07-27 PROCEDURE — G1004 CDSM NDSC: HCPCS

## 2022-07-27 PROCEDURE — 10002800 HB RX 250 W HCPCS: Performed by: EMERGENCY MEDICINE

## 2022-07-27 PROCEDURE — 85730 THROMBOPLASTIN TIME PARTIAL: CPT | Performed by: EMERGENCY MEDICINE

## 2022-07-27 PROCEDURE — 99214 OFFICE O/P EST MOD 30 MIN: CPT | Performed by: NURSE PRACTITIONER

## 2022-07-27 RX ORDER — POLYETHYLENE GLYCOL 3350 17 G/17G
17 POWDER, FOR SOLUTION ORAL DAILY
Status: DISCONTINUED | OUTPATIENT
Start: 2022-07-28 | End: 2022-07-28 | Stop reason: HOSPADM

## 2022-07-27 RX ORDER — 0.9 % SODIUM CHLORIDE 0.9 %
2 VIAL (ML) INJECTION EVERY 12 HOURS SCHEDULED
Status: DISCONTINUED | OUTPATIENT
Start: 2022-07-27 | End: 2022-07-28 | Stop reason: HOSPADM

## 2022-07-27 RX ORDER — TAMSULOSIN HYDROCHLORIDE 0.4 MG/1
0.4 CAPSULE ORAL DAILY
COMMUNITY
Start: 2022-07-26

## 2022-07-27 RX ORDER — POLYETHYLENE GLYCOL 3350 17 G/17G
17 POWDER, FOR SOLUTION ORAL DAILY PRN
Qty: 507 G | Refills: 0 | Status: SHIPPED | OUTPATIENT
Start: 2022-07-27

## 2022-07-27 RX ORDER — ONDANSETRON 2 MG/ML
4 INJECTION INTRAMUSCULAR; INTRAVENOUS ONCE
Status: COMPLETED | OUTPATIENT
Start: 2022-07-27 | End: 2022-07-27

## 2022-07-27 RX ORDER — LIDOCAINE HYDROCHLORIDE 20 MG/ML
10 JELLY TOPICAL
Status: DISCONTINUED | OUTPATIENT
Start: 2022-07-27 | End: 2022-07-28 | Stop reason: HOSPADM

## 2022-07-27 RX ADMIN — SODIUM CHLORIDE 1000 ML: 9 INJECTION, SOLUTION INTRAVENOUS at 18:02

## 2022-07-27 RX ADMIN — MORPHINE SULFATE 2 MG: 2 INJECTION, SOLUTION INTRAMUSCULAR; INTRAVENOUS at 19:00

## 2022-07-27 RX ADMIN — ONDANSETRON 4 MG: 2 INJECTION INTRAMUSCULAR; INTRAVENOUS at 18:58

## 2022-07-27 ASSESSMENT — PAIN SCALES - GENERAL
PAINLEVEL_OUTOF10: 6
PAINLEVEL_OUTOF10: 5

## 2022-07-27 NOTE — TELEPHONE ENCOUNTER
Patient requesting call back regarding cancer returning, had testing done 7/18 has had more constipation, feeling poor, no fever, nausea. Trying to get stomach straightened out before further treatments for the cancer. He has been using laxatives, no relief.

## 2022-07-27 NOTE — TELEPHONE ENCOUNTER
Patient has upcoming treatment for prostate cancer. Patient has been having ongoing bowel issues. Patient has been constipated for 3 days now. Used a fleet enema with no relief. Patient wants an xray to rule out fecal impaction.

## 2022-07-28 ENCOUNTER — TELEPHONE (OUTPATIENT)
Dept: FAMILY MEDICINE CLINIC | Facility: CLINIC | Age: 77
End: 2022-07-28

## 2022-07-28 LAB
RAINBOW EXTRA TUBES HOLD SPECIMEN: NORMAL
RAINBOW EXTRA TUBES HOLD SPECIMEN: NORMAL

## 2022-07-28 NOTE — TELEPHONE ENCOUNTER
Called patient for an update after going to [facility] ER yesterday. States that he had 2.25 L drained from his bladder when the de paz was put in. States he has noticed one blood clot. States that he was able to eat this morning. He plans to continue his Miralax and metamucil. Encouraged to call Monday if no results over the weekend. States understanding. Updated Dr. Edna Andrews recommended no metamucil since doesn't need the bulk. Called patient back and updated him. States understanding. Will also not use Fleets enema until needed for his next procedure.

## 2022-07-31 ENCOUNTER — HOSPITAL ENCOUNTER (EMERGENCY)
Age: 77
Discharge: HOME | End: 2022-07-31
Attending: EMERGENCY MEDICINE

## 2022-07-31 VITALS
BODY MASS INDEX: 36.44 KG/M2 | RESPIRATION RATE: 18 BRPM | OXYGEN SATURATION: 99 % | SYSTOLIC BLOOD PRESSURE: 164 MMHG | HEART RATE: 73 BPM | TEMPERATURE: 98 F | WEIGHT: 261.25 LBS | DIASTOLIC BLOOD PRESSURE: 100 MMHG

## 2022-07-31 DIAGNOSIS — T83.511A URINARY TRACT INFECTION ASSOCIATED WITH INDWELLING URETHRAL CATHETER, INITIAL ENCOUNTER (CMD): ICD-10-CM

## 2022-07-31 DIAGNOSIS — R33.8 ACUTE URINARY RETENTION: Primary | ICD-10-CM

## 2022-07-31 DIAGNOSIS — N39.0 URINARY TRACT INFECTION ASSOCIATED WITH INDWELLING URETHRAL CATHETER, INITIAL ENCOUNTER (CMD): ICD-10-CM

## 2022-07-31 LAB
APPEARANCE UR: ABNORMAL
BACTERIA #/AREA URNS HPF: ABNORMAL /HPF
BILIRUB UR QL STRIP: NEGATIVE
COLOR UR: ABNORMAL
GLUCOSE UR STRIP-MCNC: NEGATIVE MG/DL
HGB UR QL STRIP: ABNORMAL
HYALINE CASTS #/AREA URNS LPF: ABNORMAL /LPF
KETONES UR STRIP-MCNC: NEGATIVE MG/DL
LEUKOCYTE ESTERASE UR QL STRIP: ABNORMAL
NITRITE UR QL STRIP: NEGATIVE
PH UR STRIP: 7 [PH] (ref 5–7)
PROT UR STRIP-MCNC: 30 MG/DL
RBC #/AREA URNS HPF: ABNORMAL /HPF
SP GR UR STRIP: 1.01 (ref 1–1.03)
SQUAMOUS #/AREA URNS HPF: ABNORMAL /HPF
UROBILINOGEN UR STRIP-MCNC: 0.2 MG/DL
WBC #/AREA URNS HPF: ABNORMAL /HPF

## 2022-07-31 PROCEDURE — 10002801 HB RX 250 W/O HCPCS: Performed by: EMERGENCY MEDICINE

## 2022-07-31 PROCEDURE — 51702 INSERT TEMP BLADDER CATH: CPT

## 2022-07-31 PROCEDURE — 81001 URINALYSIS AUTO W/SCOPE: CPT | Performed by: EMERGENCY MEDICINE

## 2022-07-31 PROCEDURE — 99283 EMERGENCY DEPT VISIT LOW MDM: CPT

## 2022-07-31 PROCEDURE — 87086 URINE CULTURE/COLONY COUNT: CPT | Performed by: EMERGENCY MEDICINE

## 2022-07-31 RX ORDER — CEPHALEXIN 500 MG/1
500 CAPSULE ORAL 2 TIMES DAILY
Qty: 14 CAPSULE | Refills: 0 | Status: SHIPPED | OUTPATIENT
Start: 2022-07-31 | End: 2022-08-07

## 2022-07-31 RX ORDER — LIDOCAINE HYDROCHLORIDE 20 MG/ML
10 JELLY TOPICAL
Status: COMPLETED | OUTPATIENT
Start: 2022-07-31 | End: 2022-07-31

## 2022-07-31 RX ADMIN — LIDOCAINE HYDROCHLORIDE 10 ML: 20 JELLY TOPICAL at 20:49

## 2022-08-02 LAB — BACTERIA UR CULT: NORMAL

## 2022-08-29 ENCOUNTER — TELEPHONE (OUTPATIENT)
Dept: HEMATOLOGY/ONCOLOGY | Facility: HOSPITAL | Age: 77
End: 2022-08-29

## 2022-08-29 ENCOUNTER — OFFICE VISIT (OUTPATIENT)
Dept: FAMILY MEDICINE CLINIC | Facility: CLINIC | Age: 77
End: 2022-08-29
Payer: MEDICARE

## 2022-08-29 VITALS
DIASTOLIC BLOOD PRESSURE: 78 MMHG | OXYGEN SATURATION: 100 % | HEART RATE: 72 BPM | BODY MASS INDEX: 35.33 KG/M2 | SYSTOLIC BLOOD PRESSURE: 128 MMHG | TEMPERATURE: 98 F | WEIGHT: 258 LBS | RESPIRATION RATE: 18 BRPM | HEIGHT: 71.5 IN

## 2022-08-29 DIAGNOSIS — C61 PROSTATE CANCER (HCC): Primary | ICD-10-CM

## 2022-08-29 DIAGNOSIS — C61 MALIGNANT NEOPLASM OF PROSTATE (HCC): ICD-10-CM

## 2022-08-29 DIAGNOSIS — E11.65 TYPE 2 DIABETES MELLITUS WITH HYPERGLYCEMIA, WITHOUT LONG-TERM CURRENT USE OF INSULIN (HCC): ICD-10-CM

## 2022-08-29 PROBLEM — E66.01 MORBID (SEVERE) OBESITY DUE TO EXCESS CALORIES (HCC): Status: ACTIVE | Noted: 2022-08-29

## 2022-08-29 PROCEDURE — 1126F AMNT PAIN NOTED NONE PRSNT: CPT | Performed by: FAMILY MEDICINE

## 2022-08-29 PROCEDURE — 99214 OFFICE O/P EST MOD 30 MIN: CPT | Performed by: FAMILY MEDICINE

## 2022-08-29 RX ORDER — SILODOSIN 8 MG/1
8 CAPSULE ORAL DAILY
COMMUNITY
Start: 2022-08-25

## 2022-08-29 RX ORDER — POLYETHYLENE GLYCOL 3350 17 G/17G
17 POWDER, FOR SOLUTION ORAL
COMMUNITY
Start: 2022-07-27

## 2022-09-06 ENCOUNTER — OFFICE VISIT (OUTPATIENT)
Dept: HEMATOLOGY/ONCOLOGY | Facility: HOSPITAL | Age: 77
End: 2022-09-06
Attending: INTERNAL MEDICINE
Payer: MEDICARE

## 2022-09-06 VITALS
TEMPERATURE: 97 F | BODY MASS INDEX: 35.74 KG/M2 | HEART RATE: 68 BPM | DIASTOLIC BLOOD PRESSURE: 79 MMHG | RESPIRATION RATE: 18 BRPM | OXYGEN SATURATION: 98 % | HEIGHT: 71.5 IN | SYSTOLIC BLOOD PRESSURE: 116 MMHG | WEIGHT: 261 LBS

## 2022-09-06 DIAGNOSIS — C61 RECURRENT PROSTATE CANCER (HCC): ICD-10-CM

## 2022-09-06 DIAGNOSIS — R97.21 RISING PSA FOLLOWING TREATMENT FOR MALIGNANT NEOPLASM OF PROSTATE: Primary | ICD-10-CM

## 2022-09-06 LAB
ALBUMIN SERPL-MCNC: 3.9 G/DL (ref 3.4–5)
ALBUMIN/GLOB SERPL: 1.3 {RATIO} (ref 1–2)
ALP LIVER SERPL-CCNC: 90 U/L
ALT SERPL-CCNC: 29 U/L
ANION GAP SERPL CALC-SCNC: 0 MMOL/L (ref 0–18)
AST SERPL-CCNC: 18 U/L (ref 15–37)
BASOPHILS # BLD AUTO: 0.04 X10(3) UL (ref 0–0.2)
BASOPHILS NFR BLD AUTO: 0.6 %
BILIRUB SERPL-MCNC: 0.5 MG/DL (ref 0.1–2)
BUN BLD-MCNC: 16 MG/DL (ref 7–18)
CALCIUM BLD-MCNC: 9.3 MG/DL (ref 8.5–10.1)
CHLORIDE SERPL-SCNC: 108 MMOL/L (ref 98–112)
CO2 SERPL-SCNC: 30 MMOL/L (ref 21–32)
CREAT BLD-MCNC: 0.92 MG/DL
EOSINOPHIL # BLD AUTO: 0.38 X10(3) UL (ref 0–0.7)
EOSINOPHIL NFR BLD AUTO: 5.6 %
ERYTHROCYTE [DISTWIDTH] IN BLOOD BY AUTOMATED COUNT: 14.1 %
GFR SERPLBLD BASED ON 1.73 SQ M-ARVRAT: 86 ML/MIN/1.73M2 (ref 60–?)
GLOBULIN PLAS-MCNC: 3.1 G/DL (ref 2.8–4.4)
GLUCOSE BLD-MCNC: 94 MG/DL (ref 70–99)
HCT VFR BLD AUTO: 39.6 %
HGB BLD-MCNC: 12.9 G/DL
IMM GRANULOCYTES # BLD AUTO: 0.02 X10(3) UL (ref 0–1)
IMM GRANULOCYTES NFR BLD: 0.3 %
LYMPHOCYTES # BLD AUTO: 0.92 X10(3) UL (ref 1–4)
LYMPHOCYTES NFR BLD AUTO: 13.5 %
MCH RBC QN AUTO: 31 PG (ref 26–34)
MCHC RBC AUTO-ENTMCNC: 32.6 G/DL (ref 31–37)
MCV RBC AUTO: 95.2 FL
MONOCYTES # BLD AUTO: 0.56 X10(3) UL (ref 0.1–1)
MONOCYTES NFR BLD AUTO: 8.2 %
NEUTROPHILS # BLD AUTO: 4.91 X10 (3) UL (ref 1.5–7.7)
NEUTROPHILS # BLD AUTO: 4.91 X10(3) UL (ref 1.5–7.7)
NEUTROPHILS NFR BLD AUTO: 71.8 %
OSMOLALITY SERPL CALC.SUM OF ELEC: 287 MOSM/KG (ref 275–295)
PLATELET # BLD AUTO: 170 10(3)UL (ref 150–450)
POTASSIUM SERPL-SCNC: 3.8 MMOL/L (ref 3.5–5.1)
PROT SERPL-MCNC: 7 G/DL (ref 6.4–8.2)
PSA SERPL-MCNC: 5.38 NG/ML (ref ?–4)
RBC # BLD AUTO: 4.16 X10(6)UL
SODIUM SERPL-SCNC: 138 MMOL/L (ref 136–145)
WBC # BLD AUTO: 6.8 X10(3) UL (ref 4–11)

## 2022-09-06 PROCEDURE — 99205 OFFICE O/P NEW HI 60 MIN: CPT | Performed by: INTERNAL MEDICINE

## 2022-09-06 NOTE — PROGRESS NOTES
New consult for prostate cancer referred by Dr. Hina Araujo. Pt states he was diagnosed in [place] by a urologist who sent him  to  [place] in . He had high dose brachy therapy. In 2018 he had 2 lymph nodes that were treated with targeted  external beam radiation in [facility]. PSA has been going up. He saw a urologist in [place]. He had biopsies. [place] score 9. Then the doctor left the practice. He has a de paz cath now for a month. His wife  this spring.       Outpatient Oncology Care Plan  Problem list:  knowledge deficit    Problems related to:    disease/disease progression    Interventions:  provided general teaching    Expected outcomes:  understands plan of care    Progress towards outcome:  making progress    Education Record    Learner:  Patient  Barriers / Limitations:  None  Method:  Brief focused  Outcome:  Shows understanding  Comments:

## 2022-09-09 ENCOUNTER — HOSPITAL ENCOUNTER (OUTPATIENT)
Dept: NUCLEAR MEDICINE | Facility: HOSPITAL | Age: 77
Discharge: HOME | End: 2022-09-09
Attending: INTERNAL MEDICINE
Payer: MEDICARE

## 2022-09-09 DIAGNOSIS — C61 RECURRENT PROSTATE CANCER (HCC): ICD-10-CM

## 2022-09-09 PROCEDURE — 78815 PET IMAGE W/CT SKULL-THIGH: CPT | Performed by: INTERNAL MEDICINE

## 2022-09-13 ENCOUNTER — VIRTUAL PHONE E/M (OUTPATIENT)
Dept: HEMATOLOGY/ONCOLOGY | Facility: HOSPITAL | Age: 77
End: 2022-09-13
Attending: INTERNAL MEDICINE
Payer: MEDICARE

## 2022-09-13 DIAGNOSIS — Z71.89 OTHER SPECIFIED COUNSELING: ICD-10-CM

## 2022-09-13 DIAGNOSIS — C61 PROSTATE CANCER (HCC): ICD-10-CM

## 2022-09-13 PROCEDURE — 99443 PHONE E/M BY PHYS 21-30 MIN: CPT | Performed by: CLINICAL NURSE SPECIALIST

## 2022-09-16 ENCOUNTER — TELEPHONE (OUTPATIENT)
Dept: HEMATOLOGY/ONCOLOGY | Facility: HOSPITAL | Age: 77
End: 2022-09-16

## 2022-09-19 ENCOUNTER — OFFICE VISIT (OUTPATIENT)
Dept: HEMATOLOGY/ONCOLOGY | Facility: HOSPITAL | Age: 77
End: 2022-09-19
Attending: INTERNAL MEDICINE
Payer: MEDICARE

## 2022-09-19 ENCOUNTER — OFFICE VISIT (OUTPATIENT)
Dept: HEMATOLOGY/ONCOLOGY | Facility: HOSPITAL | Age: 77
End: 2022-09-19
Attending: INTERNAL MEDICINE

## 2022-09-19 VITALS
TEMPERATURE: 97 F | OXYGEN SATURATION: 99 % | DIASTOLIC BLOOD PRESSURE: 86 MMHG | HEART RATE: 63 BPM | BODY MASS INDEX: 35.52 KG/M2 | SYSTOLIC BLOOD PRESSURE: 134 MMHG | HEIGHT: 71.5 IN | WEIGHT: 259.38 LBS | RESPIRATION RATE: 18 BRPM

## 2022-09-19 DIAGNOSIS — C61 RECURRENT PROSTATE CANCER (HCC): ICD-10-CM

## 2022-09-19 DIAGNOSIS — C61 RECURRENT PROSTATE CANCER (HCC): Primary | ICD-10-CM

## 2022-09-19 LAB
ALBUMIN SERPL-MCNC: 3.9 G/DL (ref 3.4–5)
ALBUMIN/GLOB SERPL: 1.1 {RATIO} (ref 1–2)
ALP LIVER SERPL-CCNC: 94 U/L
ALT SERPL-CCNC: 27 U/L
ANION GAP SERPL CALC-SCNC: 0 MMOL/L (ref 0–18)
AST SERPL-CCNC: 17 U/L (ref 15–37)
BASOPHILS # BLD AUTO: 0.05 X10(3) UL (ref 0–0.2)
BASOPHILS NFR BLD AUTO: 0.8 %
BILIRUB SERPL-MCNC: 0.6 MG/DL (ref 0.1–2)
BUN BLD-MCNC: 14 MG/DL (ref 7–18)
CALCIUM BLD-MCNC: 9.4 MG/DL (ref 8.5–10.1)
CHLORIDE SERPL-SCNC: 107 MMOL/L (ref 98–112)
CO2 SERPL-SCNC: 30 MMOL/L (ref 21–32)
CREAT BLD-MCNC: 0.96 MG/DL
EOSINOPHIL # BLD AUTO: 0.31 X10(3) UL (ref 0–0.7)
EOSINOPHIL NFR BLD AUTO: 4.8 %
ERYTHROCYTE [DISTWIDTH] IN BLOOD BY AUTOMATED COUNT: 14 %
GFR SERPLBLD BASED ON 1.73 SQ M-ARVRAT: 81 ML/MIN/1.73M2 (ref 60–?)
GLOBULIN PLAS-MCNC: 3.5 G/DL (ref 2.8–4.4)
GLUCOSE BLD-MCNC: 92 MG/DL (ref 70–99)
HCT VFR BLD AUTO: 40.6 %
HGB BLD-MCNC: 13.3 G/DL
IMM GRANULOCYTES # BLD AUTO: 0.01 X10(3) UL (ref 0–1)
IMM GRANULOCYTES NFR BLD: 0.2 %
LYMPHOCYTES # BLD AUTO: 0.84 X10(3) UL (ref 1–4)
LYMPHOCYTES NFR BLD AUTO: 13 %
MCH RBC QN AUTO: 31.1 PG (ref 26–34)
MCHC RBC AUTO-ENTMCNC: 32.8 G/DL (ref 31–37)
MCV RBC AUTO: 95.1 FL
MONOCYTES # BLD AUTO: 0.51 X10(3) UL (ref 0.1–1)
MONOCYTES NFR BLD AUTO: 7.9 %
NEUTROPHILS # BLD AUTO: 4.73 X10 (3) UL (ref 1.5–7.7)
NEUTROPHILS # BLD AUTO: 4.73 X10(3) UL (ref 1.5–7.7)
NEUTROPHILS NFR BLD AUTO: 73.3 %
OSMOLALITY SERPL CALC.SUM OF ELEC: 284 MOSM/KG (ref 275–295)
PLATELET # BLD AUTO: 209 10(3)UL (ref 150–450)
POTASSIUM SERPL-SCNC: 3.8 MMOL/L (ref 3.5–5.1)
PROT SERPL-MCNC: 7.4 G/DL (ref 6.4–8.2)
PSA SERPL-MCNC: 6.72 NG/ML (ref ?–4)
RBC # BLD AUTO: 4.27 X10(6)UL
SODIUM SERPL-SCNC: 137 MMOL/L (ref 136–145)
WBC # BLD AUTO: 6.5 X10(3) UL (ref 4–11)

## 2022-09-19 PROCEDURE — 96402 CHEMO HORMON ANTINEOPL SQ/IM: CPT

## 2022-09-19 PROCEDURE — 99214 OFFICE O/P EST MOD 30 MIN: CPT | Performed by: INTERNAL MEDICINE

## 2022-09-19 NOTE — PROGRESS NOTES
Pt here for follow up and injection. Pt is going to start taking xtandi tomorrow. No new complaints.     Outpatient Oncology Care Plan  Problem list:  knowledge deficit    Problems related to:    disease/disease progression    Interventions:  provided general teaching    Expected outcomes:  understands plan of care    Progress towards outcome:  making progress    Education Record    Learner:  Patient  Barriers / Limitations:  None  Method:  Brief focused  Outcome:  Shows understanding  Comments:

## 2022-09-19 NOTE — PROGRESS NOTES
Education Record    Learner:  Patient    Disease / Diagnosis: Prostate Cancer    Barriers / Limitations:  None   Comments:    Method:  Brief focused   Comments:    General Topics:  Plan of care reviewed   Comments:    Outcome:  Shows understanding   Comments:

## 2022-10-13 ENCOUNTER — OFFICE VISIT (OUTPATIENT)
Dept: FAMILY MEDICINE CLINIC | Facility: CLINIC | Age: 77
End: 2022-10-13
Payer: MEDICARE

## 2022-10-13 ENCOUNTER — LAB ENCOUNTER (OUTPATIENT)
Dept: LAB | Age: 77
End: 2022-10-13
Attending: FAMILY MEDICINE
Payer: MEDICARE

## 2022-10-13 VITALS
HEIGHT: 71.5 IN | WEIGHT: 255.38 LBS | SYSTOLIC BLOOD PRESSURE: 138 MMHG | BODY MASS INDEX: 34.97 KG/M2 | HEART RATE: 70 BPM | DIASTOLIC BLOOD PRESSURE: 82 MMHG | OXYGEN SATURATION: 99 % | RESPIRATION RATE: 18 BRPM | TEMPERATURE: 97 F

## 2022-10-13 DIAGNOSIS — Z13.6 SCREENING FOR CARDIOVASCULAR CONDITION: ICD-10-CM

## 2022-10-13 DIAGNOSIS — E78.00 PURE HYPERCHOLESTEROLEMIA: ICD-10-CM

## 2022-10-13 DIAGNOSIS — E66.01 MORBID (SEVERE) OBESITY DUE TO EXCESS CALORIES (HCC): ICD-10-CM

## 2022-10-13 DIAGNOSIS — E11.65 TYPE 2 DIABETES MELLITUS WITH HYPERGLYCEMIA, WITHOUT LONG-TERM CURRENT USE OF INSULIN (HCC): ICD-10-CM

## 2022-10-13 DIAGNOSIS — E11.65 TYPE 2 DIABETES MELLITUS WITH HYPERGLYCEMIA, WITHOUT LONG-TERM CURRENT USE OF INSULIN (HCC): Primary | ICD-10-CM

## 2022-10-13 DIAGNOSIS — Z00.00 ENCOUNTER FOR ANNUAL HEALTH EXAMINATION: ICD-10-CM

## 2022-10-13 LAB
CHOLEST SERPL-MCNC: 162 MG/DL (ref ?–200)
CREAT UR-SCNC: 29.1 MG/DL
EST. AVERAGE GLUCOSE BLD GHB EST-MCNC: 134 MG/DL (ref 68–126)
FASTING PATIENT LIPID ANSWER: NO
HBA1C MFR BLD: 6.3 % (ref ?–5.7)
HDLC SERPL-MCNC: 47 MG/DL (ref 40–59)
LDLC SERPL CALC-MCNC: 90 MG/DL (ref ?–100)
MICROALBUMIN UR-MCNC: 2.22 MG/DL
MICROALBUMIN/CREAT 24H UR-RTO: 76.3 UG/MG (ref ?–30)
NONHDLC SERPL-MCNC: 115 MG/DL (ref ?–130)
TRIGL SERPL-MCNC: 144 MG/DL (ref 30–149)
VLDLC SERPL CALC-MCNC: 23 MG/DL (ref 0–30)

## 2022-10-13 PROCEDURE — 83036 HEMOGLOBIN GLYCOSYLATED A1C: CPT

## 2022-10-13 PROCEDURE — 82043 UR ALBUMIN QUANTITATIVE: CPT | Performed by: FAMILY MEDICINE

## 2022-10-13 PROCEDURE — 80061 LIPID PANEL: CPT

## 2022-10-13 PROCEDURE — 99214 OFFICE O/P EST MOD 30 MIN: CPT | Performed by: FAMILY MEDICINE

## 2022-10-13 PROCEDURE — 82570 ASSAY OF URINE CREATININE: CPT | Performed by: FAMILY MEDICINE

## 2022-10-13 PROCEDURE — 36415 COLL VENOUS BLD VENIPUNCTURE: CPT

## 2022-10-14 ENCOUNTER — TELEPHONE (OUTPATIENT)
Dept: FAMILY MEDICINE CLINIC | Facility: CLINIC | Age: 77
End: 2022-10-14

## 2022-10-14 RX ORDER — LISINOPRIL 10 MG/1
10 TABLET ORAL DAILY
Qty: 90 TABLET | Refills: 0 | Status: SHIPPED | OUTPATIENT
Start: 2022-10-14

## 2022-10-14 NOTE — TELEPHONE ENCOUNTER
----- Message from Delmis Brady MD sent at 10/14/2022 10:01 AM CDT -----  Cholesterol and AIC look good.    Continue present managment

## 2022-10-14 NOTE — TELEPHONE ENCOUNTER
Discussed with patient. Sent prescription as ordered below. Appointment scheduled.     Future Appointments   Date Time Provider Leeann Ray   10/17/2022 10:30 AM Trever Nettles MD [address] [place] [address]   11/12/2022 10:30 AM Howard Lara MD [facility] [place]

## 2022-10-14 NOTE — TELEPHONE ENCOUNTER
----- Message from Anita Schmitt MD sent at 10/14/2022 10:00 AM CDT -----  Slightly increased microabuminuria  Recommend repeat in 6 months. Add lisinopril 10 mg po daily. Recheck patient and cmp in 1 month.

## 2022-10-17 ENCOUNTER — OFFICE VISIT (OUTPATIENT)
Dept: HEMATOLOGY/ONCOLOGY | Facility: HOSPITAL | Age: 77
End: 2022-10-17
Attending: INTERNAL MEDICINE
Payer: MEDICARE

## 2022-10-17 VITALS
BODY MASS INDEX: 35.06 KG/M2 | OXYGEN SATURATION: 98 % | WEIGHT: 256 LBS | DIASTOLIC BLOOD PRESSURE: 84 MMHG | HEIGHT: 71.5 IN | TEMPERATURE: 98 F | HEART RATE: 68 BPM | SYSTOLIC BLOOD PRESSURE: 133 MMHG | RESPIRATION RATE: 16 BRPM

## 2022-10-17 DIAGNOSIS — C61 PROSTATE CANCER (HCC): Primary | ICD-10-CM

## 2022-10-17 DIAGNOSIS — C61 RECURRENT PROSTATE CANCER (HCC): ICD-10-CM

## 2022-10-17 LAB
ALBUMIN SERPL-MCNC: 3.7 G/DL (ref 3.4–5)
ALBUMIN/GLOB SERPL: 1.1 {RATIO} (ref 1–2)
ALP LIVER SERPL-CCNC: 89 U/L
ALT SERPL-CCNC: 34 U/L
ANION GAP SERPL CALC-SCNC: 5 MMOL/L (ref 0–18)
AST SERPL-CCNC: 22 U/L (ref 15–37)
BASOPHILS # BLD AUTO: 0.03 X10(3) UL (ref 0–0.2)
BASOPHILS NFR BLD AUTO: 0.4 %
BILIRUB SERPL-MCNC: 0.6 MG/DL (ref 0.1–2)
BUN BLD-MCNC: 16 MG/DL (ref 7–18)
CALCIUM BLD-MCNC: 9.6 MG/DL (ref 8.5–10.1)
CHLORIDE SERPL-SCNC: 107 MMOL/L (ref 98–112)
CO2 SERPL-SCNC: 27 MMOL/L (ref 21–32)
CREAT BLD-MCNC: 0.91 MG/DL
EOSINOPHIL # BLD AUTO: 0.23 X10(3) UL (ref 0–0.7)
EOSINOPHIL NFR BLD AUTO: 3.4 %
ERYTHROCYTE [DISTWIDTH] IN BLOOD BY AUTOMATED COUNT: 13.5 %
GFR SERPLBLD BASED ON 1.73 SQ M-ARVRAT: 87 ML/MIN/1.73M2 (ref 60–?)
GLOBULIN PLAS-MCNC: 3.4 G/DL (ref 2.8–4.4)
GLUCOSE BLD-MCNC: 108 MG/DL (ref 70–99)
HCT VFR BLD AUTO: 39 %
HGB BLD-MCNC: 12.9 G/DL
IMM GRANULOCYTES # BLD AUTO: 0.02 X10(3) UL (ref 0–1)
IMM GRANULOCYTES NFR BLD: 0.3 %
LYMPHOCYTES # BLD AUTO: 0.89 X10(3) UL (ref 1–4)
LYMPHOCYTES NFR BLD AUTO: 13.3 %
MCH RBC QN AUTO: 31.1 PG (ref 26–34)
MCHC RBC AUTO-ENTMCNC: 33.1 G/DL (ref 31–37)
MCV RBC AUTO: 94 FL
MONOCYTES # BLD AUTO: 0.56 X10(3) UL (ref 0.1–1)
MONOCYTES NFR BLD AUTO: 8.4 %
NEUTROPHILS # BLD AUTO: 4.97 X10 (3) UL (ref 1.5–7.7)
NEUTROPHILS # BLD AUTO: 4.97 X10(3) UL (ref 1.5–7.7)
NEUTROPHILS NFR BLD AUTO: 74.2 %
OSMOLALITY SERPL CALC.SUM OF ELEC: 290 MOSM/KG (ref 275–295)
PLATELET # BLD AUTO: 236 10(3)UL (ref 150–450)
POTASSIUM SERPL-SCNC: 4 MMOL/L (ref 3.5–5.1)
PROT SERPL-MCNC: 7.1 G/DL (ref 6.4–8.2)
PSA SERPL-MCNC: 0.3 NG/ML (ref ?–4)
RBC # BLD AUTO: 4.15 X10(6)UL
SODIUM SERPL-SCNC: 139 MMOL/L (ref 136–145)
WBC # BLD AUTO: 6.7 X10(3) UL (ref 4–11)

## 2022-10-17 PROCEDURE — 99215 OFFICE O/P EST HI 40 MIN: CPT | Performed by: INTERNAL MEDICINE

## 2022-10-17 NOTE — PROGRESS NOTES
Pt here for follow up. Pt is taking xtandi daily. Tolerating xtandi well.     Outpatient Oncology Care Plan  Problem list:  knowledge deficit    Problems related to:    disease/disease progression    Interventions:  provided general teaching    Expected outcomes:  understands plan of care    Progress towards outcome:  making progress    Education Record    Learner:  Patient  Barriers / Limitations:  None  Method:  Brief focused  Outcome:  Shows understanding  Comments:

## 2022-10-27 ENCOUNTER — OFFICE VISIT (OUTPATIENT)
Dept: FAMILY MEDICINE CLINIC | Facility: CLINIC | Age: 77
End: 2022-10-27
Payer: MEDICARE

## 2022-10-27 VITALS
DIASTOLIC BLOOD PRESSURE: 80 MMHG | OXYGEN SATURATION: 98 % | BODY MASS INDEX: 34.94 KG/M2 | RESPIRATION RATE: 18 BRPM | HEIGHT: 71.5 IN | SYSTOLIC BLOOD PRESSURE: 132 MMHG | HEART RATE: 81 BPM | TEMPERATURE: 97 F | WEIGHT: 255.19 LBS

## 2022-10-27 DIAGNOSIS — K59.02 CONSTIPATION DUE TO OUTLET DYSFUNCTION: ICD-10-CM

## 2022-10-27 DIAGNOSIS — R33.9 URINARY RETENTION: Primary | ICD-10-CM

## 2022-10-27 PROCEDURE — 99214 OFFICE O/P EST MOD 30 MIN: CPT | Performed by: FAMILY MEDICINE

## 2022-10-27 PROCEDURE — 1126F AMNT PAIN NOTED NONE PRSNT: CPT | Performed by: FAMILY MEDICINE

## 2022-10-27 NOTE — PATIENT INSTRUCTIONS
Fleets enema after self catherization. Increase miralax to 17 gm twice  day. Add colace 100 mg orally twice a day. Progress report Monday.

## 2022-10-28 ENCOUNTER — OFFICE VISIT (OUTPATIENT)
Dept: SURGERY | Facility: CLINIC | Age: 77
End: 2022-10-28
Payer: MEDICARE

## 2022-10-28 DIAGNOSIS — N39.490 OVERFLOW INCONTINENCE: ICD-10-CM

## 2022-10-28 DIAGNOSIS — R31.0 GROSS HEMATURIA: ICD-10-CM

## 2022-10-28 DIAGNOSIS — K59.00 CONSTIPATION, UNSPECIFIED CONSTIPATION TYPE: ICD-10-CM

## 2022-10-28 DIAGNOSIS — R82.90 URINE FINDING: Primary | ICD-10-CM

## 2022-10-28 DIAGNOSIS — R33.9 URINARY RETENTION: ICD-10-CM

## 2022-10-28 LAB
BILIRUBIN: NEGATIVE
GLUCOSE (URINE DIPSTICK): NEGATIVE MG/DL
KETONES (URINE DIPSTICK): NEGATIVE MG/DL
LEUKOCYTES: NEGATIVE
MULTISTIX LOT#: ABNORMAL NUMERIC
NITRITE, URINE: NEGATIVE
PH, URINE: 6 (ref 4.5–8)
PROTEIN (URINE DIPSTICK): NEGATIVE MG/DL
SPECIFIC GRAVITY: 1.01 (ref 1–1.03)
URINE-COLOR: YELLOW
UROBILINOGEN,SEMI-QN: 0.2 MG/DL (ref 0–1.9)

## 2022-10-28 PROCEDURE — 99205 OFFICE O/P NEW HI 60 MIN: CPT | Performed by: UROLOGY

## 2022-10-28 PROCEDURE — 81003 URINALYSIS AUTO W/O SCOPE: CPT | Performed by: UROLOGY

## 2022-10-28 RX ORDER — SILODOSIN 8 MG/1
8 CAPSULE ORAL DAILY
Qty: 90 CAPSULE | Refills: 5 | Status: SHIPPED | OUTPATIENT
Start: 2022-10-28

## 2022-10-28 NOTE — PATIENT INSTRUCTIONS
Increase CIC frequency to 5 times per day.  Ideally enough to keep urine volumes < 450 mL  Drink at least 40-60 oz water per day to keep urine clear  Continue silodosin  Start Kegels and PFT  Plan for CT urogram, UDS and office cystoscopy for further evaluation
85

## 2022-11-02 ENCOUNTER — TELEPHONE (OUTPATIENT)
Dept: SURGERY | Facility: CLINIC | Age: 77
End: 2022-11-02

## 2022-11-02 NOTE — TELEPHONE ENCOUNTER
Spoke with patient and order sent to [facility] for new order for catheters increasing CIC to 6/day. Patient notified that [facility] will be reaching out to him. Verbalized understanding.

## 2022-11-07 ENCOUNTER — TELEPHONE (OUTPATIENT)
Dept: SURGERY | Facility: CLINIC | Age: 77
End: 2022-11-07

## 2022-11-07 NOTE — TELEPHONE ENCOUNTER
Per pt has a cystoscopy tomorrow and asking if he will need someone to drive him home. Per pt needs to know asap so he can arrange transportation if needed.  Please advise

## 2022-11-08 ENCOUNTER — TELEPHONE (OUTPATIENT)
Dept: SURGERY | Facility: CLINIC | Age: 77
End: 2022-11-08

## 2022-11-08 ENCOUNTER — PROCEDURE (OUTPATIENT)
Dept: SURGERY | Facility: CLINIC | Age: 77
End: 2022-11-08
Payer: MEDICARE

## 2022-11-08 ENCOUNTER — NURSE ONLY (OUTPATIENT)
Dept: SURGERY | Facility: CLINIC | Age: 77
End: 2022-11-08
Payer: MEDICARE

## 2022-11-08 DIAGNOSIS — N39.490 OVERFLOW INCONTINENCE: ICD-10-CM

## 2022-11-08 DIAGNOSIS — R82.90 URINE FINDING: Primary | ICD-10-CM

## 2022-11-08 DIAGNOSIS — R33.9 URINARY RETENTION: Primary | ICD-10-CM

## 2022-11-08 DIAGNOSIS — R33.9 URINARY RETENTION: ICD-10-CM

## 2022-11-08 DIAGNOSIS — C61 PROSTATE CANCER (HCC): Primary | ICD-10-CM

## 2022-11-08 DIAGNOSIS — R31.0 GROSS HEMATURIA: ICD-10-CM

## 2022-11-08 DIAGNOSIS — K59.00 CONSTIPATION, UNSPECIFIED CONSTIPATION TYPE: ICD-10-CM

## 2022-11-08 DIAGNOSIS — R39.12 WEAK URINARY STREAM: ICD-10-CM

## 2022-11-08 LAB
APPEARANCE: CLEAR
BILIRUBIN: NEGATIVE
GLUCOSE (URINE DIPSTICK): NEGATIVE MG/DL
KETONES (URINE DIPSTICK): NEGATIVE MG/DL
LEUKOCYTES: NEGATIVE
MULTISTIX LOT#: ABNORMAL NUMERIC
NITRITE, URINE: NEGATIVE
PH, URINE: 6.5 (ref 4.5–8)
PROTEIN (URINE DIPSTICK): NEGATIVE MG/DL
SPECIFIC GRAVITY: 1.01 (ref 1–1.03)
URINE-COLOR: YELLOW
UROBILINOGEN,SEMI-QN: 0.2 MG/DL (ref 0–1.9)

## 2022-11-08 PROCEDURE — 51784 ANAL/URINARY MUSCLE STUDY: CPT | Performed by: UROLOGY

## 2022-11-08 PROCEDURE — 81003 URINALYSIS AUTO W/O SCOPE: CPT | Performed by: UROLOGY

## 2022-11-08 PROCEDURE — 99215 OFFICE O/P EST HI 40 MIN: CPT | Performed by: UROLOGY

## 2022-11-08 PROCEDURE — 52000 CYSTOURETHROSCOPY: CPT | Performed by: UROLOGY

## 2022-11-08 PROCEDURE — 51797 INTRAABDOMINAL PRESSURE TEST: CPT | Performed by: UROLOGY

## 2022-11-08 PROCEDURE — 51728 CYSTOMETROGRAM W/VP: CPT | Performed by: UROLOGY

## 2022-11-08 PROCEDURE — 51741 ELECTRO-UROFLOWMETRY FIRST: CPT | Performed by: UROLOGY

## 2022-11-08 RX ORDER — CIPROFLOXACIN 500 MG/1
500 TABLET, FILM COATED ORAL ONCE
Status: COMPLETED | OUTPATIENT
Start: 2022-11-08 | End: 2022-11-08

## 2022-11-08 RX ADMIN — CIPROFLOXACIN 500 MG: 500 TABLET, FILM COATED ORAL at 14:00:00

## 2022-11-08 NOTE — TELEPHONE ENCOUNTER
Please schedule this patient for surgery. He also would like appt with me a couple weeks prior to scheduled surgery to review surgery again. Thanks,    Kvng Sears    Urology Surgery Request  Surgeon: Frederico Apley (if known): EDW  Procedure: TURP   Anesthesia: General   Time Frame: pt preference.  He's leaning towards Jan-Feb after he finishes PFT  Time required: 90 minutes  Diagnosis: prostate cancer with urinary retention    Antibiotics: per hospital protocol unless checked below   _x_ Levaquin 500 mg IV   ___ Gemcitabine 2 g/100 mL NS bladder instillation to be given in OR    Estimated Post Op/Follow Up Appt: ~ 2 weeks with Milly with PVR. ~ 3-4 mo with me with PVR

## 2022-11-08 NOTE — TELEPHONE ENCOUNTER
Per pt asking if CT urogram is still needed to be completed due to good outcome with cysto today? Please advise thank you.

## 2022-11-09 NOTE — TELEPHONE ENCOUNTER
Called patient this date and he is requesting surgery on 1/26/23 which was scheduled this date at [facility]. He was told I would call him back in a couple of weeks to review all details.

## 2022-11-09 NOTE — TELEPHONE ENCOUNTER
LM notifying patient to proceed with CT scan as scheduled.     Future Appointments   Date Time Provider Leeann Flor   11/12/2022 10:30 AM Ling Holcomb MD [facility]   11/14/2022  7:30 AM [facility] CT MAIN RM3 [facility] CT Jackson Gaines   11/14/2022 10:30 AM Vasiliy Nettles MD [facility] [facility]

## 2022-11-12 ENCOUNTER — OFFICE VISIT (OUTPATIENT)
Dept: FAMILY MEDICINE CLINIC | Facility: CLINIC | Age: 77
End: 2022-11-12
Payer: COMMERCIAL

## 2022-11-12 VITALS
WEIGHT: 255 LBS | HEART RATE: 84 BPM | BODY MASS INDEX: 34.92 KG/M2 | RESPIRATION RATE: 16 BRPM | HEIGHT: 71.5 IN | OXYGEN SATURATION: 98 % | SYSTOLIC BLOOD PRESSURE: 128 MMHG | DIASTOLIC BLOOD PRESSURE: 84 MMHG | TEMPERATURE: 97 F

## 2022-11-12 DIAGNOSIS — C61 RECURRENT PROSTATE CANCER (HCC): ICD-10-CM

## 2022-11-12 DIAGNOSIS — I10 ESSENTIAL HYPERTENSION: Primary | ICD-10-CM

## 2022-11-12 PROCEDURE — 3074F SYST BP LT 130 MM HG: CPT | Performed by: FAMILY MEDICINE

## 2022-11-12 PROCEDURE — 99214 OFFICE O/P EST MOD 30 MIN: CPT | Performed by: FAMILY MEDICINE

## 2022-11-12 PROCEDURE — 3008F BODY MASS INDEX DOCD: CPT | Performed by: FAMILY MEDICINE

## 2022-11-12 PROCEDURE — 3079F DIAST BP 80-89 MM HG: CPT | Performed by: FAMILY MEDICINE

## 2022-11-12 PROCEDURE — 1126F AMNT PAIN NOTED NONE PRSNT: CPT | Performed by: FAMILY MEDICINE

## 2022-11-14 ENCOUNTER — OFFICE VISIT (OUTPATIENT)
Dept: HEMATOLOGY/ONCOLOGY | Facility: HOSPITAL | Age: 77
End: 2022-11-14
Attending: INTERNAL MEDICINE
Payer: MEDICARE

## 2022-11-14 ENCOUNTER — TELEPHONE (OUTPATIENT)
Dept: SURGERY | Facility: CLINIC | Age: 77
End: 2022-11-14

## 2022-11-14 ENCOUNTER — HOSPITAL ENCOUNTER (OUTPATIENT)
Dept: CT IMAGING | Facility: HOSPITAL | Age: 77
Discharge: HOME | End: 2022-11-14
Attending: UROLOGY
Payer: MEDICARE

## 2022-11-14 VITALS
SYSTOLIC BLOOD PRESSURE: 134 MMHG | DIASTOLIC BLOOD PRESSURE: 86 MMHG | WEIGHT: 258 LBS | OXYGEN SATURATION: 97 % | BODY MASS INDEX: 35.33 KG/M2 | RESPIRATION RATE: 16 BRPM | HEART RATE: 93 BPM | TEMPERATURE: 97 F | HEIGHT: 71.5 IN

## 2022-11-14 DIAGNOSIS — R31.0 GROSS HEMATURIA: ICD-10-CM

## 2022-11-14 DIAGNOSIS — C61 RECURRENT PROSTATE CANCER (HCC): ICD-10-CM

## 2022-11-14 LAB
ALBUMIN SERPL-MCNC: 3.8 G/DL (ref 3.4–5)
ALBUMIN/GLOB SERPL: 1.2 {RATIO} (ref 1–2)
ALP LIVER SERPL-CCNC: 80 U/L
ALT SERPL-CCNC: 32 U/L
ANION GAP SERPL CALC-SCNC: 3 MMOL/L (ref 0–18)
AST SERPL-CCNC: 21 U/L (ref 15–37)
BASOPHILS # BLD AUTO: 0.03 X10(3) UL (ref 0–0.2)
BASOPHILS NFR BLD AUTO: 0.5 %
BILIRUB SERPL-MCNC: 0.5 MG/DL (ref 0.1–2)
BUN BLD-MCNC: 16 MG/DL (ref 7–18)
CALCIUM BLD-MCNC: 9.4 MG/DL (ref 8.5–10.1)
CHLORIDE SERPL-SCNC: 107 MMOL/L (ref 98–112)
CO2 SERPL-SCNC: 31 MMOL/L (ref 21–32)
CREAT BLD-MCNC: 0.95 MG/DL
EOSINOPHIL # BLD AUTO: 0.28 X10(3) UL (ref 0–0.7)
EOSINOPHIL NFR BLD AUTO: 4.5 %
ERYTHROCYTE [DISTWIDTH] IN BLOOD BY AUTOMATED COUNT: 13.9 %
FASTING STATUS PATIENT QL REPORTED: NO
GFR SERPLBLD BASED ON 1.73 SQ M-ARVRAT: 82 ML/MIN/1.73M2 (ref 60–?)
GLOBULIN PLAS-MCNC: 3.2 G/DL (ref 2.8–4.4)
GLUCOSE BLD-MCNC: 138 MG/DL (ref 70–99)
HCT VFR BLD AUTO: 39.9 %
HGB BLD-MCNC: 13.5 G/DL
IMM GRANULOCYTES # BLD AUTO: 0.02 X10(3) UL (ref 0–1)
IMM GRANULOCYTES NFR BLD: 0.3 %
LYMPHOCYTES # BLD AUTO: 0.84 X10(3) UL (ref 1–4)
LYMPHOCYTES NFR BLD AUTO: 13.5 %
MCH RBC QN AUTO: 32.1 PG (ref 26–34)
MCHC RBC AUTO-ENTMCNC: 33.8 G/DL (ref 31–37)
MCV RBC AUTO: 95 FL
MONOCYTES # BLD AUTO: 0.33 X10(3) UL (ref 0.1–1)
MONOCYTES NFR BLD AUTO: 5.3 %
NEUTROPHILS # BLD AUTO: 4.73 X10 (3) UL (ref 1.5–7.7)
NEUTROPHILS # BLD AUTO: 4.73 X10(3) UL (ref 1.5–7.7)
NEUTROPHILS NFR BLD AUTO: 75.9 %
OSMOLALITY SERPL CALC.SUM OF ELEC: 295 MOSM/KG (ref 275–295)
PLATELET # BLD AUTO: 204 10(3)UL (ref 150–450)
POTASSIUM SERPL-SCNC: 3.4 MMOL/L (ref 3.5–5.1)
PROT SERPL-MCNC: 7 G/DL (ref 6.4–8.2)
PSA SERPL-MCNC: 0.15 NG/ML (ref ?–4)
RBC # BLD AUTO: 4.2 X10(6)UL
SODIUM SERPL-SCNC: 141 MMOL/L (ref 136–145)
WBC # BLD AUTO: 6.2 X10(3) UL (ref 4–11)

## 2022-11-14 PROCEDURE — 99213 OFFICE O/P EST LOW 20 MIN: CPT | Performed by: INTERNAL MEDICINE

## 2022-11-14 PROCEDURE — 74178 CT ABD&PLV WO CNTR FLWD CNTR: CPT | Performed by: UROLOGY

## 2022-11-14 PROCEDURE — 76377 3D RENDER W/INTRP POSTPROCES: CPT | Performed by: UROLOGY

## 2022-11-14 RX ORDER — DOCUSATE SODIUM 100 MG/1
100 CAPSULE, LIQUID FILLED ORAL 2 TIMES DAILY
COMMUNITY

## 2022-11-14 RX ORDER — MELATONIN
1000 DAILY
COMMUNITY

## 2022-11-14 NOTE — TELEPHONE ENCOUNTER
Patient returning call. Requesting appointment per doctor. Please call cell phone [phone].  Please advise

## 2022-11-14 NOTE — PROGRESS NOTES
Pt here for follow up. He is taking xtandi daily. He self cath's every 4 hours. He complains of fatigue. He states he is also urinating on his own.     Outpatient Oncology Care Plan  Problem list:  knowledge deficit    Problems related to:    disease/disease progression    Interventions:  provided general teaching    Expected outcomes:  understands plan of care    Progress towards outcome:  making progress    Education Record    Learner:  Patient  Barriers / Limitations:  None  Method:  Brief focused  Outcome:  Shows understanding  Comments:

## 2022-11-14 NOTE — TELEPHONE ENCOUNTER
I left VM for patient. Please call him back if he doesn't return message. I'd like to review CT findings with him. OK for VV. If VV you can double book the first appt of the day. Also could do prior to start of an afternoon clinic. Thanks,  MPH    Below if for Documentation Purposes Only:  I reviewed CTU images. Report states left hydro with possible narrowing in proximal to mid ureter. I see good caliber ureter down to level of bladder. If pt is not having flank pain I would suggest doing cystogram and left RPG at time of TURP with possible left URS if abnormality is noted with stent placement. If he does not wish to proceed with TURP would recommend cysto, left RPG with possible URS and biopsy.

## 2022-11-15 NOTE — TELEPHONE ENCOUNTER
Scheduled for VV tomorrow morning.   Future Appointments   Date Time Provider Leeann Perezisti   11/16/2022  8:00 AM Amrik Martinez MD [facility] 4   12/19/2022  1:15 PM Josy Calhoun MD [address] [employer]   2/9/2023 10:00 AM Vitor Nobles MD [facility]

## 2022-11-16 ENCOUNTER — TELEPHONE (OUTPATIENT)
Dept: SURGERY | Facility: CLINIC | Age: 77
End: 2022-11-16

## 2022-11-16 ENCOUNTER — TELEMEDICINE (OUTPATIENT)
Dept: SURGERY | Facility: CLINIC | Age: 77
End: 2022-11-16

## 2022-11-16 DIAGNOSIS — N39.490 OVERFLOW INCONTINENCE: ICD-10-CM

## 2022-11-16 DIAGNOSIS — C61 PROSTATE CANCER (HCC): Primary | ICD-10-CM

## 2022-11-16 DIAGNOSIS — N13.30 HYDRONEPHROSIS OF LEFT KIDNEY: ICD-10-CM

## 2022-11-16 DIAGNOSIS — R39.12 WEAK URINARY STREAM: ICD-10-CM

## 2022-11-16 DIAGNOSIS — R33.9 URINARY RETENTION: ICD-10-CM

## 2022-11-16 NOTE — TELEPHONE ENCOUNTER
Scheduled.   Future Appointments   Date Time Provider Leeann Ray   12/19/2022  1:15 PM Nadege Altman MD [address] [employer]   1/4/2023  9:30 AM Donna Angulo MD [facility] 4   2/9/2023 10:00 AM Shabana Napoles MD [facility] [place]

## 2022-11-17 DIAGNOSIS — E78.00 PURE HYPERCHOLESTEROLEMIA: ICD-10-CM

## 2022-11-17 RX ORDER — ALLOPURINOL 300 MG/1
TABLET ORAL
Qty: 90 TABLET | Refills: 0 | Status: SHIPPED | OUTPATIENT
Start: 2022-11-17

## 2022-11-17 RX ORDER — OLMESARTAN MEDOXOMIL AND HYDROCHLOROTHIAZIDE 40/12.5 40; 12.5 MG/1; MG/1
TABLET ORAL
Qty: 90 TABLET | Refills: 0 | Status: SHIPPED | OUTPATIENT
Start: 2022-11-17

## 2022-11-17 RX ORDER — ATORVASTATIN CALCIUM 20 MG/1
TABLET, FILM COATED ORAL
Qty: 90 TABLET | Refills: 0 | Status: SHIPPED | OUTPATIENT
Start: 2022-11-17

## 2022-11-17 RX ORDER — CLONIDINE HYDROCHLORIDE 0.1 MG/1
TABLET ORAL
Qty: 270 TABLET | Refills: 0 | Status: SHIPPED | OUTPATIENT
Start: 2022-11-17

## 2022-11-17 NOTE — TELEPHONE ENCOUNTER
Future appt: Your appointments     Date & Time Appointment Department [facility])    Dec 19, 2022  1:15 PM CST HEMATOLOGY ONCOLOGY FOLLOW UP with Erin Arenas MD [address] [facility] in [place] ([facility])        Jan 04, 2023  9:30 AM CST Follow Up Visit with Alberto Oliveira MD [address] [facility] [place], [address] [place] [address] [place] ([facility] [facility] 4)    [facility],    To keep our patients, visitors and care team members safe and healthy at this time of year when illness is at its peak, [address] [place] [address] is working to ensure physical distancing can occur throughout our care environment. We ask that you bring no more than one , over the age of 25, to support your care visit. If space is limited, we may ask that individual to wait in the car, assured that our care team will provider timely updates and instructions for when they can enter the clinic. For pediatrics, please bring only those children who are seeing a provider that day. If you have recently tested positive for COVID-19, have been exposed to someone with a confirmed case or are experiencing symptoms, please call the clinic before coming in.      [address] [place] [address]      Feb 09, 2023 10:00 AM CST Follow Up Visit with Luciana Krabbe, MD [facility], [place] ([facility])            [address] [facility] in [facility]. [address] [facility] [address] [facility], [address] Perla Gal[address] [facility] [place]  [address] [facility] Neftali Calderon [address]  [phone]        Last Appointment with provider:   11/12/22 Diabetes follow up  Last appointment at [facility]:  11/12/2022  Cholesterol, Total (mg/dL)   Date Value   10/13/2022 162     HDL Cholesterol (mg/dL)   Date Value   10/13/2022 47     LDL Cholesterol (mg/dL)   Date Value   10/13/2022 90     Triglycerides (mg/dL)   Date Value   10/13/2022 144     Lab Results   Component Value Date     (H) 10/13/2022    A1C 6.3 (H) 10/13/2022     Lab Results   Component Value Date    TSH 1.410 04/26/2021       No follow-ups on file.

## 2022-12-15 RX ORDER — OLMESARTAN MEDOXOMIL AND HYDROCHLOROTHIAZIDE 40/12.5 40; 12.5 MG/1; MG/1
1 TABLET ORAL DAILY
Qty: 90 TABLET | Refills: 0 | Status: SHIPPED | OUTPATIENT
Start: 2022-12-15

## 2022-12-15 NOTE — TELEPHONE ENCOUNTER
Patient lost his refill that he filled on 11/17/22. [place] notified and will notify insurance and refill the med as soon as a new rx is sent.

## 2022-12-15 NOTE — TELEPHONE ENCOUNTER
Patient verifies he is on both Lisinopril and the Olmesartan-hydrochlorothiazide. He feels well on this and denies any symptoms such as dizziness or light-headedness. States he has only 1 tablet of the Olmesartan left and lost the last bottle.

## 2022-12-15 NOTE — TELEPHONE ENCOUNTER
Olmesartan Medoxomil-hydrochlorothiazide to [facility] in [place]. Only has 1 pill left, so needs refill today. Lost his last refill. Call patient only if questions or problems.

## 2022-12-19 ENCOUNTER — OFFICE VISIT (OUTPATIENT)
Dept: HEMATOLOGY/ONCOLOGY | Facility: HOSPITAL | Age: 77
End: 2022-12-19
Attending: UROLOGY
Payer: MEDICARE

## 2022-12-19 VITALS
OXYGEN SATURATION: 99 % | BODY MASS INDEX: 34.92 KG/M2 | HEART RATE: 65 BPM | RESPIRATION RATE: 16 BRPM | DIASTOLIC BLOOD PRESSURE: 85 MMHG | SYSTOLIC BLOOD PRESSURE: 136 MMHG | HEIGHT: 71.5 IN | WEIGHT: 255 LBS | TEMPERATURE: 97 F

## 2022-12-19 DIAGNOSIS — C61 RECURRENT PROSTATE CANCER (HCC): ICD-10-CM

## 2022-12-19 LAB
ALBUMIN SERPL-MCNC: 3.8 G/DL (ref 3.4–5)
ALBUMIN/GLOB SERPL: 1 {RATIO} (ref 1–2)
ALP LIVER SERPL-CCNC: 92 U/L
ALT SERPL-CCNC: 25 U/L
ANION GAP SERPL CALC-SCNC: 4 MMOL/L (ref 0–18)
AST SERPL-CCNC: 18 U/L (ref 15–37)
BASOPHILS # BLD AUTO: 0.04 X10(3) UL (ref 0–0.2)
BASOPHILS NFR BLD AUTO: 0.6 %
BILIRUB SERPL-MCNC: 0.6 MG/DL (ref 0.1–2)
BUN BLD-MCNC: 17 MG/DL (ref 7–18)
CALCIUM BLD-MCNC: 9.9 MG/DL (ref 8.5–10.1)
CHLORIDE SERPL-SCNC: 106 MMOL/L (ref 98–112)
CO2 SERPL-SCNC: 30 MMOL/L (ref 21–32)
CREAT BLD-MCNC: 0.83 MG/DL
EOSINOPHIL # BLD AUTO: 0.33 X10(3) UL (ref 0–0.7)
EOSINOPHIL NFR BLD AUTO: 5.2 %
ERYTHROCYTE [DISTWIDTH] IN BLOOD BY AUTOMATED COUNT: 13.6 %
GFR SERPLBLD BASED ON 1.73 SQ M-ARVRAT: 90 ML/MIN/1.73M2 (ref 60–?)
GLOBULIN PLAS-MCNC: 3.9 G/DL (ref 2.8–4.4)
GLUCOSE BLD-MCNC: 100 MG/DL (ref 70–99)
HCT VFR BLD AUTO: 42 %
HGB BLD-MCNC: 13.8 G/DL
IMM GRANULOCYTES # BLD AUTO: 0.01 X10(3) UL (ref 0–1)
IMM GRANULOCYTES NFR BLD: 0.2 %
LYMPHOCYTES # BLD AUTO: 1.08 X10(3) UL (ref 1–4)
LYMPHOCYTES NFR BLD AUTO: 16.9 %
MCH RBC QN AUTO: 30.9 PG (ref 26–34)
MCHC RBC AUTO-ENTMCNC: 32.9 G/DL (ref 31–37)
MCV RBC AUTO: 94.2 FL
MONOCYTES # BLD AUTO: 0.47 X10(3) UL (ref 0.1–1)
MONOCYTES NFR BLD AUTO: 7.4 %
NEUTROPHILS # BLD AUTO: 4.46 X10 (3) UL (ref 1.5–7.7)
NEUTROPHILS # BLD AUTO: 4.46 X10(3) UL (ref 1.5–7.7)
NEUTROPHILS NFR BLD AUTO: 69.7 %
OSMOLALITY SERPL CALC.SUM OF ELEC: 292 MOSM/KG (ref 275–295)
PLATELET # BLD AUTO: 215 10(3)UL (ref 150–450)
POTASSIUM SERPL-SCNC: 3.6 MMOL/L (ref 3.5–5.1)
PROT SERPL-MCNC: 7.7 G/DL (ref 6.4–8.2)
PSA SERPL-MCNC: 0.06 NG/ML (ref ?–4)
RBC # BLD AUTO: 4.46 X10(6)UL
SODIUM SERPL-SCNC: 140 MMOL/L (ref 136–145)
WBC # BLD AUTO: 6.4 X10(3) UL (ref 4–11)

## 2022-12-19 PROCEDURE — 99214 OFFICE O/P EST MOD 30 MIN: CPT | Performed by: INTERNAL MEDICINE

## 2022-12-19 NOTE — PROGRESS NOTES
Pt here for follow up. Pt is taking 160 mg xtandi daily. Pt self cath's every 4 hours. Attempts to urinate every 2 hours.        Outpatient Oncology Care Plan  Problem list:  knowledge deficit    Problems related to:    disease/disease progression    Interventions:  provided general teaching    Expected outcomes:  understands plan of care    Progress towards outcome:  making progress    Education Record    Learner:  Patient  Barriers / Limitations:  None  Method:  Brief focused  Outcome:  Shows understanding  Comments:

## 2023-01-04 ENCOUNTER — OFFICE VISIT (OUTPATIENT)
Dept: SURGERY | Facility: CLINIC | Age: 78
End: 2023-01-04
Payer: COMMERCIAL

## 2023-01-04 DIAGNOSIS — R31.0 GROSS HEMATURIA: ICD-10-CM

## 2023-01-04 DIAGNOSIS — R39.12 WEAK URINARY STREAM: ICD-10-CM

## 2023-01-04 DIAGNOSIS — N13.30 HYDRONEPHROSIS OF LEFT KIDNEY: ICD-10-CM

## 2023-01-04 DIAGNOSIS — N39.490 OVERFLOW INCONTINENCE: ICD-10-CM

## 2023-01-04 DIAGNOSIS — R33.9 URINARY RETENTION: ICD-10-CM

## 2023-01-04 DIAGNOSIS — C61 PROSTATE CANCER (HCC): Primary | ICD-10-CM

## 2023-01-04 LAB
APPEARANCE: CLEAR
BILIRUBIN: NEGATIVE
GLUCOSE (URINE DIPSTICK): NEGATIVE MG/DL
KETONES (URINE DIPSTICK): NEGATIVE MG/DL
LEUKOCYTES: NEGATIVE
MULTISTIX LOT#: ABNORMAL NUMERIC
NITRITE, URINE: NEGATIVE
PH, URINE: 6 (ref 4.5–8)
PROTEIN (URINE DIPSTICK): NEGATIVE MG/DL
SPECIFIC GRAVITY: 1.02 (ref 1–1.03)
URINE-COLOR: YELLOW
UROBILINOGEN,SEMI-QN: 0.2 MG/DL (ref 0–1.9)

## 2023-01-04 PROCEDURE — 81003 URINALYSIS AUTO W/O SCOPE: CPT | Performed by: UROLOGY

## 2023-01-04 PROCEDURE — 51798 US URINE CAPACITY MEASURE: CPT | Performed by: UROLOGY

## 2023-01-04 PROCEDURE — 99214 OFFICE O/P EST MOD 30 MIN: CPT | Performed by: UROLOGY

## 2023-01-09 ENCOUNTER — TELEPHONE (OUTPATIENT)
Dept: FAMILY MEDICINE CLINIC | Facility: CLINIC | Age: 78
End: 2023-01-09

## 2023-01-09 RX ORDER — LISINOPRIL 10 MG/1
10 TABLET ORAL DAILY
Qty: 90 TABLET | Refills: 0 | Status: CANCELLED | OUTPATIENT
Start: 2023-01-09

## 2023-01-09 NOTE — TELEPHONE ENCOUNTER
Patient is agreeable. However, states he has been taking both lisinopril and olmesartan-hydrochlorothiazide for the past 2 months. Per Dr. Keri Lombardi, go ahead and discontinue the lisinopril. Patient states understanding.

## 2023-01-09 NOTE — TELEPHONE ENCOUNTER
From the [facility] Pediatric Gastroenterology Team:  Thank you for allowing us to take care of your child!     If you have further questions or concerns after your appointment, please send us a message through "[employer]" or call nurse Rita at [phone].    To make, cancel, or reschedule an appointment please call [phone].     IF IMAGING OR TESTS ORDERED TODAY: Please call [phone] to schedule.    PLAN  • Continue Nutramigen     Medication Refill Requests  • Please look over your medications before coming to your appointment, to see if you need any refills.  • Pharmacy (medication) refills   ? Call your pharmacy for a refill   ? If a new prescription is needed, the pharmacy will contact us for an updated prescription  ? It can take up to 3 business days to send the updated prescription, so call the pharmacy before running out of your medication  ? Call your pharmacy after 2-3 days to see if your prescription is ready for      Lab Result Notifications:    Please allow 7-10 business days for labs to result. We will send a "[employer] message or call you once ALL your labs have resulted.  If there is an abnormal result that needs to be addressed sooner, we will contact you.    [facility] (online medical record):   We highly recommend that you sign up for "[employer]", if you don't already have this.  You will be able to review scheduled appointments, handle scheduling and canceling [place] appointments, review your lab work, view your medication list, and ask your provider and nurse non-urgent questions, without waiting on the phone.      To enroll:  Phone: [phone]  OR   Website: [email or web address] should not be used for urgent concerns.     Our Patients are Important  • We want to provide the best care for your child, and you can help us improve.  You may receive a survey asking you about this visit. Please complete this survey, and we will use your feedback to  Stop lisinopril. Will recheck blood pressure in February already on an ARB. make improvements.     Thank you,   Dr. Shelia Salinas, RN

## 2023-01-09 NOTE — TELEPHONE ENCOUNTER
Future appt: Your appointments     Date & Time Appointment Department [facility])    Feb 09, 2023 10:00 AM CST Follow Up Visit with Kate Chery MD [facility] ([facility])        Jun 02, 2023 10:15 AM CDT CT UROGRAM WITH AND WITHOUT CONTRAST with [address] [facility] [address] ([facility])    Please arrive 15 minutes prior to your scheduled appointment time. Jun 12, 2023 10:30 AM CDT Exam - Established with Yasmeen Ribera MD [facility], [facility], [address] [facility] ([facility] 4)            [facility] [address] [address] [address], [facility] Group [place]  [address] [facility] [address] [place] [address] [address] [facility] 21         Last Appointment with provider:   11/12/2022 for HTN follow up. Return in 3 months. Last appointment at [facility]:  11/12/2022  Cholesterol, Total (mg/dL)   Date Value   10/13/2022 162     HDL Cholesterol (mg/dL)   Date Value   10/13/2022 47     LDL Cholesterol (mg/dL)   Date Value   10/13/2022 90     Triglycerides (mg/dL)   Date Value   10/13/2022 144     Lab Results   Component Value Date     (H) 10/13/2022    A1C 6.3 (H) 10/13/2022     Lab Results   Component Value Date    TSH 1.410 04/26/2021       No follow-ups on file.

## 2023-01-30 ENCOUNTER — TELEPHONE (OUTPATIENT)
Dept: HEMATOLOGY/ONCOLOGY | Facility: HOSPITAL | Age: 78
End: 2023-01-30

## 2023-01-30 ENCOUNTER — LABORATORY ENCOUNTER (OUTPATIENT)
Dept: LAB | Age: 78
End: 2023-01-30
Attending: FAMILY MEDICINE
Payer: MEDICARE

## 2023-01-30 DIAGNOSIS — C61 RECURRENT PROSTATE CANCER (HCC): ICD-10-CM

## 2023-01-30 LAB
ALBUMIN SERPL-MCNC: 3.8 G/DL (ref 3.4–5)
ALBUMIN/GLOB SERPL: 1.1 {RATIO} (ref 1–2)
ALP LIVER SERPL-CCNC: 98 U/L
ALT SERPL-CCNC: 26 U/L
ANION GAP SERPL CALC-SCNC: 3 MMOL/L (ref 0–18)
AST SERPL-CCNC: 19 U/L (ref 15–37)
BASOPHILS # BLD AUTO: 0.02 X10(3) UL (ref 0–0.2)
BASOPHILS NFR BLD AUTO: 0.4 %
BILIRUB SERPL-MCNC: 0.6 MG/DL (ref 0.1–2)
BUN BLD-MCNC: 14 MG/DL (ref 7–18)
CALCIUM BLD-MCNC: 9.9 MG/DL (ref 8.5–10.1)
CHLORIDE SERPL-SCNC: 105 MMOL/L (ref 98–112)
CO2 SERPL-SCNC: 32 MMOL/L (ref 21–32)
CREAT BLD-MCNC: 0.8 MG/DL
EOSINOPHIL # BLD AUTO: 0.28 X10(3) UL (ref 0–0.7)
EOSINOPHIL NFR BLD AUTO: 5 %
ERYTHROCYTE [DISTWIDTH] IN BLOOD BY AUTOMATED COUNT: 13.7 %
FASTING STATUS PATIENT QL REPORTED: NO
GFR SERPLBLD BASED ON 1.73 SQ M-ARVRAT: 91 ML/MIN/1.73M2 (ref 60–?)
GLOBULIN PLAS-MCNC: 3.6 G/DL (ref 2.8–4.4)
GLUCOSE BLD-MCNC: 129 MG/DL (ref 70–99)
HCT VFR BLD AUTO: 40.4 %
HGB BLD-MCNC: 13.3 G/DL
IMM GRANULOCYTES # BLD AUTO: 0.02 X10(3) UL (ref 0–1)
IMM GRANULOCYTES NFR BLD: 0.4 %
LYMPHOCYTES # BLD AUTO: 0.86 X10(3) UL (ref 1–4)
LYMPHOCYTES NFR BLD AUTO: 15.4 %
MCH RBC QN AUTO: 31.3 PG (ref 26–34)
MCHC RBC AUTO-ENTMCNC: 32.9 G/DL (ref 31–37)
MCV RBC AUTO: 95.1 FL
MONOCYTES # BLD AUTO: 0.47 X10(3) UL (ref 0.1–1)
MONOCYTES NFR BLD AUTO: 8.4 %
NEUTROPHILS # BLD AUTO: 3.94 X10 (3) UL (ref 1.5–7.7)
NEUTROPHILS # BLD AUTO: 3.94 X10(3) UL (ref 1.5–7.7)
NEUTROPHILS NFR BLD AUTO: 70.4 %
OSMOLALITY SERPL CALC.SUM OF ELEC: 292 MOSM/KG (ref 275–295)
PLATELET # BLD AUTO: 238 10(3)UL (ref 150–450)
POTASSIUM SERPL-SCNC: 3.5 MMOL/L (ref 3.5–5.1)
PROT SERPL-MCNC: 7.4 G/DL (ref 6.4–8.2)
PSA SERPL-MCNC: 0.03 NG/ML (ref ?–4)
RBC # BLD AUTO: 4.25 X10(6)UL
SODIUM SERPL-SCNC: 140 MMOL/L (ref 136–145)
WBC # BLD AUTO: 5.6 X10(3) UL (ref 4–11)

## 2023-01-30 PROCEDURE — 85025 COMPLETE CBC W/AUTO DIFF WBC: CPT

## 2023-01-30 PROCEDURE — 84153 ASSAY OF PSA TOTAL: CPT

## 2023-01-30 PROCEDURE — 80053 COMPREHEN METABOLIC PANEL: CPT

## 2023-01-30 PROCEDURE — 36415 COLL VENOUS BLD VENIPUNCTURE: CPT

## 2023-01-30 NOTE — TELEPHONE ENCOUNTER
Patient calling to advise he will be losing his insurance  That covers his Ma Cam. For Part D coverage  Just to cover the Vivien Stockton  Is 34.000 a year. He is asking by stopping the Ma Cam  How will it effect his injections?       Please call to discuss

## 2023-01-31 ENCOUNTER — TELEPHONE (OUTPATIENT)
Dept: SURGERY | Facility: CLINIC | Age: 78
End: 2023-01-31

## 2023-01-31 NOTE — TELEPHONE ENCOUNTER
Per Waleska Mitchell PT, would like to speak to RN regarding pt's end goal for treatment.  Please advise

## 2023-02-07 NOTE — TELEPHONE ENCOUNTER
He would need a visit with me or Amira Moulton to discuss options again with JUAREZ SS and PVR as well as records of his most recent post-void caths. At this time I would recommend he continue CIC and f/u with me as we discussed.  I think it would be unlikely we could safely stop CIC right now but he can come back in and we can review sooner than next visit if he wishes

## 2023-02-07 NOTE — TELEPHONE ENCOUNTER
I called PT and they are asking what a reasonable end goal would be for this patient. Johann Yates asking if it is feasible to reduce his CIC to a certain number or if it is feasible to reduce CIC to 0. They wanted MPH's opinion.

## 2023-02-09 ENCOUNTER — OFFICE VISIT (OUTPATIENT)
Dept: FAMILY MEDICINE CLINIC | Facility: CLINIC | Age: 78
End: 2023-02-09
Payer: MEDICARE

## 2023-02-09 VITALS
HEART RATE: 96 BPM | DIASTOLIC BLOOD PRESSURE: 88 MMHG | HEIGHT: 71.5 IN | RESPIRATION RATE: 18 BRPM | BODY MASS INDEX: 35.3 KG/M2 | OXYGEN SATURATION: 98 % | WEIGHT: 257.81 LBS | SYSTOLIC BLOOD PRESSURE: 132 MMHG | TEMPERATURE: 97 F

## 2023-02-09 DIAGNOSIS — M79.641 RIGHT HAND PAIN: ICD-10-CM

## 2023-02-09 DIAGNOSIS — E78.00 HYPERCHOLESTEROLEMIA: ICD-10-CM

## 2023-02-09 DIAGNOSIS — E66.01 MORBID (SEVERE) OBESITY DUE TO EXCESS CALORIES (HCC): ICD-10-CM

## 2023-02-09 DIAGNOSIS — I10 PRIMARY HYPERTENSION: Primary | ICD-10-CM

## 2023-02-09 DIAGNOSIS — E11.65 TYPE 2 DIABETES MELLITUS WITH HYPERGLYCEMIA, WITHOUT LONG-TERM CURRENT USE OF INSULIN (HCC): ICD-10-CM

## 2023-02-09 DIAGNOSIS — I71.21 ANEURYSM OF ASCENDING AORTA WITHOUT RUPTURE: ICD-10-CM

## 2023-02-09 PROCEDURE — 99214 OFFICE O/P EST MOD 30 MIN: CPT | Performed by: FAMILY MEDICINE

## 2023-02-09 RX ORDER — CLOBETASOL PROPIONATE 0.46 MG/ML
1 SOLUTION TOPICAL 2 TIMES DAILY
Qty: 50 ML | Refills: 0 | Status: SHIPPED | OUTPATIENT
Start: 2023-02-09

## 2023-02-09 RX ORDER — CLONIDINE HYDROCHLORIDE 0.1 MG/1
0.1 TABLET ORAL 2 TIMES DAILY
Qty: 270 TABLET | Refills: 0 | Status: SHIPPED | OUTPATIENT
Start: 2023-02-09

## 2023-02-09 RX ORDER — CLOBETASOL PROPIONATE 0.46 MG/ML
SOLUTION TOPICAL 2 TIMES DAILY
COMMUNITY
End: 2023-02-09

## 2023-02-10 ENCOUNTER — HOSPITAL ENCOUNTER (OUTPATIENT)
Dept: GENERAL RADIOLOGY | Age: 78
Discharge: HOME | End: 2023-02-10
Attending: FAMILY MEDICINE
Payer: MEDICARE

## 2023-02-10 DIAGNOSIS — M79.641 RIGHT HAND PAIN: ICD-10-CM

## 2023-02-10 PROCEDURE — 73130 X-RAY EXAM OF HAND: CPT | Performed by: FAMILY MEDICINE

## 2023-02-10 NOTE — TELEPHONE ENCOUNTER
MPH, to clarify, do you need this appt to assess the patient to give physical therapy a recommendation on end goals for this patient?

## 2023-02-13 NOTE — TELEPHONE ENCOUNTER
No I don't need an appointment to discuss PT. The appt is with me or Milly to discuss if he can stop CIC. I think it will be very unlikely for him to stop CIC this soon. This is not an urgent visit. Any more questions please see me to discuss.  thanks

## 2023-02-15 NOTE — TELEPHONE ENCOUNTER
Left VM with Physical therapy that stopping CIC would be unlikely at this time.      Pt has a f/u appt scheduled in June

## 2023-02-23 ENCOUNTER — OFFICE VISIT (OUTPATIENT)
Dept: FAMILY MEDICINE CLINIC | Facility: CLINIC | Age: 78
End: 2023-02-23
Payer: MEDICARE

## 2023-02-23 VITALS
HEIGHT: 71 IN | HEART RATE: 72 BPM | DIASTOLIC BLOOD PRESSURE: 82 MMHG | WEIGHT: 259.19 LBS | RESPIRATION RATE: 16 BRPM | SYSTOLIC BLOOD PRESSURE: 138 MMHG | BODY MASS INDEX: 36.29 KG/M2 | TEMPERATURE: 98 F | OXYGEN SATURATION: 98 %

## 2023-02-23 DIAGNOSIS — I10 PRIMARY HYPERTENSION: Primary | ICD-10-CM

## 2023-02-23 DIAGNOSIS — E11.65 TYPE 2 DIABETES MELLITUS WITH HYPERGLYCEMIA, WITHOUT LONG-TERM CURRENT USE OF INSULIN (HCC): ICD-10-CM

## 2023-02-23 DIAGNOSIS — I10 ESSENTIAL HYPERTENSION: ICD-10-CM

## 2023-02-23 PROCEDURE — 99214 OFFICE O/P EST MOD 30 MIN: CPT | Performed by: FAMILY MEDICINE

## 2023-02-23 RX ORDER — OLMESARTAN MEDOXOMIL AND HYDROCHLOROTHIAZIDE 40/25 40; 25 MG/1; MG/1
1 TABLET ORAL DAILY
Qty: 90 TABLET | Refills: 3 | Status: SHIPPED | OUTPATIENT
Start: 2023-02-23 | End: 2024-02-18

## 2023-03-13 DIAGNOSIS — E78.00 PURE HYPERCHOLESTEROLEMIA: ICD-10-CM

## 2023-03-13 RX ORDER — ALLOPURINOL 300 MG/1
300 TABLET ORAL DAILY
Qty: 90 TABLET | Refills: 0 | Status: SHIPPED | OUTPATIENT
Start: 2023-03-13

## 2023-03-13 RX ORDER — ATORVASTATIN CALCIUM 20 MG/1
20 TABLET, FILM COATED ORAL EVERY EVENING
Qty: 90 TABLET | Refills: 0 | Status: SHIPPED | OUTPATIENT
Start: 2023-03-13

## 2023-03-13 NOTE — TELEPHONE ENCOUNTER
Future appt: Your appointments     Date & Time Appointment Department [facility])    Mar 20, 2023  8:15 AM CDT what is this with REF 1 Jennifer Shell Anjali, Demar[place] Vanessa ([facility] [place])        Apr 03, 2023  2:00 PM CDT Follow Up Visit with MD Goldy Segura Dalton ([facility])        Jun 02, 2023 10:15 AM CDT CT UROGRAM WITH AND WITHOUT CONTRAST with [address] ([address])    Please arrive 15 minutes prior to your scheduled appointment time. Jun 12, 2023 10:30 AM CDT Exam - Established with Amrik Martinez MD [facility], [address] [place] [address] Varun Holden ([address] [place] [address])            [facility] [address] [place] [address] [address] [facility] [address] Lab, [place] Vanessa  [facility] [place]  [phone] [id number]  [address], [address] Varun Singh  [address] Calderon [address] [address] [place] [id number] [phone]    Goldy Amaro [facility]  [phone]        Last Appointment with provider:   2/23/2023 for HTN follow up. Last appointment at [facility]:  2/23/2023  Cholesterol, Total (mg/dL)   Date Value   10/13/2022 162     HDL Cholesterol (mg/dL)   Date Value   10/13/2022 47     LDL Cholesterol (mg/dL)   Date Value   10/13/2022 90     Triglycerides (mg/dL)   Date Value   10/13/2022 144     Lab Results   Component Value Date     (H) 10/13/2022    A1C 6.3 (H) 10/13/2022     Lab Results   Component Value Date    TSH 1.410 04/26/2021       No follow-ups on file.

## 2023-03-20 ENCOUNTER — LABORATORY ENCOUNTER (OUTPATIENT)
Dept: LAB | Age: 78
End: 2023-03-20
Attending: FAMILY MEDICINE
Payer: MEDICARE

## 2023-03-20 DIAGNOSIS — I10 ESSENTIAL HYPERTENSION: ICD-10-CM

## 2023-03-20 DIAGNOSIS — E11.65 TYPE 2 DIABETES MELLITUS WITH HYPERGLYCEMIA, WITHOUT LONG-TERM CURRENT USE OF INSULIN (HCC): ICD-10-CM

## 2023-03-20 DIAGNOSIS — C61 RECURRENT PROSTATE CANCER (HCC): ICD-10-CM

## 2023-03-20 LAB
ALBUMIN SERPL-MCNC: 3.7 G/DL (ref 3.4–5)
ALBUMIN/GLOB SERPL: 0.9 {RATIO} (ref 1–2)
ALP LIVER SERPL-CCNC: 99 U/L
ALT SERPL-CCNC: 24 U/L
ANION GAP SERPL CALC-SCNC: 8 MMOL/L (ref 0–18)
AST SERPL-CCNC: 19 U/L (ref 15–37)
BASOPHILS # BLD AUTO: 0.03 X10(3) UL (ref 0–0.2)
BASOPHILS NFR BLD AUTO: 0.4 %
BILIRUB SERPL-MCNC: 0.7 MG/DL (ref 0.1–2)
BUN BLD-MCNC: 21 MG/DL (ref 7–18)
CALCIUM BLD-MCNC: 10.4 MG/DL (ref 8.5–10.1)
CHLORIDE SERPL-SCNC: 103 MMOL/L (ref 98–112)
CO2 SERPL-SCNC: 29 MMOL/L (ref 21–32)
CREAT BLD-MCNC: 0.85 MG/DL
EOSINOPHIL # BLD AUTO: 0.3 X10(3) UL (ref 0–0.7)
EOSINOPHIL NFR BLD AUTO: 4.3 %
ERYTHROCYTE [DISTWIDTH] IN BLOOD BY AUTOMATED COUNT: 13.9 %
FASTING STATUS PATIENT QL REPORTED: YES
GFR SERPLBLD BASED ON 1.73 SQ M-ARVRAT: 89 ML/MIN/1.73M2 (ref 60–?)
GLOBULIN PLAS-MCNC: 3.9 G/DL (ref 2.8–4.4)
GLUCOSE BLD-MCNC: 132 MG/DL (ref 70–99)
HCT VFR BLD AUTO: 41.7 %
HGB BLD-MCNC: 13.4 G/DL
IMM GRANULOCYTES # BLD AUTO: 0.01 X10(3) UL (ref 0–1)
IMM GRANULOCYTES NFR BLD: 0.1 %
LYMPHOCYTES # BLD AUTO: 1.06 X10(3) UL (ref 1–4)
LYMPHOCYTES NFR BLD AUTO: 15.3 %
MCH RBC QN AUTO: 30.4 PG (ref 26–34)
MCHC RBC AUTO-ENTMCNC: 32.1 G/DL (ref 31–37)
MCV RBC AUTO: 94.6 FL
MONOCYTES # BLD AUTO: 0.68 X10(3) UL (ref 0.1–1)
MONOCYTES NFR BLD AUTO: 9.8 %
NEUTROPHILS # BLD AUTO: 4.83 X10 (3) UL (ref 1.5–7.7)
NEUTROPHILS # BLD AUTO: 4.83 X10(3) UL (ref 1.5–7.7)
NEUTROPHILS NFR BLD AUTO: 70.1 %
OSMOLALITY SERPL CALC.SUM OF ELEC: 295 MOSM/KG (ref 275–295)
PLATELET # BLD AUTO: 236 10(3)UL (ref 150–450)
POTASSIUM SERPL-SCNC: 3.6 MMOL/L (ref 3.5–5.1)
PROT SERPL-MCNC: 7.6 G/DL (ref 6.4–8.2)
PSA SERPL-MCNC: 0.02 NG/ML (ref ?–4)
RBC # BLD AUTO: 4.41 X10(6)UL
SODIUM SERPL-SCNC: 140 MMOL/L (ref 136–145)
WBC # BLD AUTO: 6.9 X10(3) UL (ref 4–11)

## 2023-03-20 PROCEDURE — 84153 ASSAY OF PSA TOTAL: CPT

## 2023-03-20 PROCEDURE — 83036 HEMOGLOBIN GLYCOSYLATED A1C: CPT

## 2023-03-20 PROCEDURE — 80053 COMPREHEN METABOLIC PANEL: CPT

## 2023-03-20 PROCEDURE — 85025 COMPLETE CBC W/AUTO DIFF WBC: CPT

## 2023-03-20 PROCEDURE — 36415 COLL VENOUS BLD VENIPUNCTURE: CPT

## 2023-03-21 ENCOUNTER — TELEPHONE (OUTPATIENT)
Dept: FAMILY MEDICINE CLINIC | Facility: CLINIC | Age: 78
End: 2023-03-21

## 2023-03-21 DIAGNOSIS — E83.52 HYPERCALCEMIA: ICD-10-CM

## 2023-03-21 DIAGNOSIS — E11.65 TYPE 2 DIABETES MELLITUS WITH HYPERGLYCEMIA, WITHOUT LONG-TERM CURRENT USE OF INSULIN (HCC): Primary | ICD-10-CM

## 2023-03-21 LAB
EST. AVERAGE GLUCOSE BLD GHB EST-MCNC: 131 MG/DL (ref 68–126)
HBA1C MFR BLD: 6.2 % (ref ?–5.7)

## 2023-03-21 NOTE — TELEPHONE ENCOUNTER
----- Message from Libra Acharya MD sent at 3/21/2023  1:52 PM CDT -----  AIC is stable.    Continue present managment

## 2023-03-21 NOTE — TELEPHONE ENCOUNTER
----- Message from Rashard Gonzalez MD sent at 3/21/2023  8:40 AM CDT -----  Sugar is higher,  add AIC. Calcium is high,  has patient started calcium supplementation?

## 2023-03-22 NOTE — TELEPHONE ENCOUNTER
Pt called back states that the only calcium that he takes is a multivitamin daily that has 210mg of calcium

## 2023-03-23 NOTE — TELEPHONE ENCOUNTER
Notified patient in detailed message. Patient to return call with questions and to schedule lab draw in 2 weeks.

## 2023-03-28 ENCOUNTER — PATIENT OUTREACH (OUTPATIENT)
Dept: FAMILY MEDICINE CLINIC | Facility: CLINIC | Age: 78
End: 2023-03-28

## 2023-03-28 NOTE — PROGRESS NOTES
Left message to see if April appointment can be moved to after May 1 to schedule his medicare wellness exam, due after 05-01

## 2023-04-04 ENCOUNTER — TELEPHONE (OUTPATIENT)
Dept: HEMATOLOGY/ONCOLOGY | Facility: HOSPITAL | Age: 78
End: 2023-04-04

## 2023-04-04 DIAGNOSIS — C61 RECURRENT PROSTATE CANCER (HCC): ICD-10-CM

## 2023-04-04 NOTE — TELEPHONE ENCOUNTER
Orlin Cano needs refill on the following medication:  XTANDI  /Enzalutamide 40 MG Oral Tab. He says that he has enough to last until 4/16/2023. He has a new pharmacy Card Id [id number] plan [id number] rx id [id number] rx pcn PDPIL RX GP 0001 RX MEDI PDP the new mail order is   [facility]  [phone] to get started in the mean time he is going to get his Rx card scanned in the system.  Thanks [employer]

## 2023-04-05 ENCOUNTER — LABORATORY ENCOUNTER (OUTPATIENT)
Dept: LAB | Age: 78
End: 2023-04-05
Attending: FAMILY MEDICINE
Payer: MEDICARE

## 2023-04-05 DIAGNOSIS — E83.52 HYPERCALCEMIA: ICD-10-CM

## 2023-04-05 LAB
CALCIUM BLD-MCNC: 9.6 MG/DL (ref 8.5–10.1)
CREAT BLD-MCNC: 0.82 MG/DL
PHOSPHATE SERPL-MCNC: 3.7 MG/DL (ref 2.5–4.9)
PTH-INTACT SERPL-MCNC: 42.4 PG/ML (ref 18.5–88)

## 2023-04-05 PROCEDURE — 36415 COLL VENOUS BLD VENIPUNCTURE: CPT

## 2023-04-05 PROCEDURE — 83970 ASSAY OF PARATHORMONE: CPT

## 2023-04-05 PROCEDURE — 84100 ASSAY OF PHOSPHORUS: CPT

## 2023-04-05 PROCEDURE — 82565 ASSAY OF CREATININE: CPT

## 2023-04-05 PROCEDURE — 82310 ASSAY OF CALCIUM: CPT

## 2023-04-24 ENCOUNTER — LABORATORY ENCOUNTER (OUTPATIENT)
Dept: LAB | Age: 78
End: 2023-04-24
Attending: INTERNAL MEDICINE
Payer: MEDICARE

## 2023-04-24 DIAGNOSIS — C61 RECURRENT PROSTATE CANCER (HCC): ICD-10-CM

## 2023-04-24 LAB
ALBUMIN SERPL-MCNC: 3.6 G/DL (ref 3.4–5)
ALBUMIN/GLOB SERPL: 0.9 {RATIO} (ref 1–2)
ALP LIVER SERPL-CCNC: 110 U/L
ALT SERPL-CCNC: 20 U/L
ANION GAP SERPL CALC-SCNC: 4 MMOL/L (ref 0–18)
AST SERPL-CCNC: 16 U/L (ref 15–37)
BASOPHILS # BLD AUTO: 0.04 X10(3) UL (ref 0–0.2)
BASOPHILS NFR BLD AUTO: 0.5 %
BILIRUB SERPL-MCNC: 0.4 MG/DL (ref 0.1–2)
BUN BLD-MCNC: 18 MG/DL (ref 7–18)
CALCIUM BLD-MCNC: 10 MG/DL (ref 8.5–10.1)
CHLORIDE SERPL-SCNC: 107 MMOL/L (ref 98–112)
CO2 SERPL-SCNC: 29 MMOL/L (ref 21–32)
CREAT BLD-MCNC: 0.81 MG/DL
EOSINOPHIL # BLD AUTO: 0.33 X10(3) UL (ref 0–0.7)
EOSINOPHIL NFR BLD AUTO: 3.7 %
ERYTHROCYTE [DISTWIDTH] IN BLOOD BY AUTOMATED COUNT: 14.2 %
FASTING STATUS PATIENT QL REPORTED: NO
GFR SERPLBLD BASED ON 1.73 SQ M-ARVRAT: 91 ML/MIN/1.73M2 (ref 60–?)
GLOBULIN PLAS-MCNC: 4 G/DL (ref 2.8–4.4)
GLUCOSE BLD-MCNC: 107 MG/DL (ref 70–99)
HCT VFR BLD AUTO: 40.7 %
HGB BLD-MCNC: 13.3 G/DL
IMM GRANULOCYTES # BLD AUTO: 0.04 X10(3) UL (ref 0–1)
IMM GRANULOCYTES NFR BLD: 0.5 %
LYMPHOCYTES # BLD AUTO: 1.09 X10(3) UL (ref 1–4)
LYMPHOCYTES NFR BLD AUTO: 12.3 %
MCH RBC QN AUTO: 30 PG (ref 26–34)
MCHC RBC AUTO-ENTMCNC: 32.7 G/DL (ref 31–37)
MCV RBC AUTO: 91.9 FL
MONOCYTES # BLD AUTO: 0.67 X10(3) UL (ref 0.1–1)
MONOCYTES NFR BLD AUTO: 7.6 %
NEUTROPHILS # BLD AUTO: 6.66 X10 (3) UL (ref 1.5–7.7)
NEUTROPHILS # BLD AUTO: 6.66 X10(3) UL (ref 1.5–7.7)
NEUTROPHILS NFR BLD AUTO: 75.4 %
OSMOLALITY SERPL CALC.SUM OF ELEC: 292 MOSM/KG (ref 275–295)
PLATELET # BLD AUTO: 273 10(3)UL (ref 150–450)
POTASSIUM SERPL-SCNC: 4.3 MMOL/L (ref 3.5–5.1)
PROT SERPL-MCNC: 7.6 G/DL (ref 6.4–8.2)
PSA SERPL-MCNC: 0.01 NG/ML (ref ?–4)
RBC # BLD AUTO: 4.43 X10(6)UL
SODIUM SERPL-SCNC: 140 MMOL/L (ref 136–145)
WBC # BLD AUTO: 8.8 X10(3) UL (ref 4–11)

## 2023-04-24 PROCEDURE — 36415 COLL VENOUS BLD VENIPUNCTURE: CPT

## 2023-04-24 PROCEDURE — 80053 COMPREHEN METABOLIC PANEL: CPT

## 2023-04-24 PROCEDURE — 85025 COMPLETE CBC W/AUTO DIFF WBC: CPT

## 2023-04-24 PROCEDURE — 84153 ASSAY OF PSA TOTAL: CPT

## 2023-05-08 ENCOUNTER — OFFICE VISIT (OUTPATIENT)
Dept: FAMILY MEDICINE CLINIC | Facility: CLINIC | Age: 78
End: 2023-05-08
Payer: MEDICARE

## 2023-05-08 VITALS
HEART RATE: 78 BPM | OXYGEN SATURATION: 98 % | TEMPERATURE: 97 F | DIASTOLIC BLOOD PRESSURE: 82 MMHG | RESPIRATION RATE: 18 BRPM | HEIGHT: 71.5 IN | WEIGHT: 261.81 LBS | SYSTOLIC BLOOD PRESSURE: 134 MMHG | BODY MASS INDEX: 35.85 KG/M2

## 2023-05-08 DIAGNOSIS — I44.0 FIRST DEGREE ATRIOVENTRICULAR BLOCK: ICD-10-CM

## 2023-05-08 DIAGNOSIS — E66.01 MORBID (SEVERE) OBESITY DUE TO EXCESS CALORIES (HCC): ICD-10-CM

## 2023-05-08 DIAGNOSIS — Z11.59 NEED FOR HEPATITIS C SCREENING TEST: ICD-10-CM

## 2023-05-08 DIAGNOSIS — E55.9 VITAMIN D DEFICIENCY: ICD-10-CM

## 2023-05-08 DIAGNOSIS — E53.8 VITAMIN B12 DEFICIENCY: ICD-10-CM

## 2023-05-08 DIAGNOSIS — E78.00 HYPERCHOLESTEROLEMIA: ICD-10-CM

## 2023-05-08 DIAGNOSIS — I10 PRIMARY HYPERTENSION: ICD-10-CM

## 2023-05-08 DIAGNOSIS — L57.0 ACTINIC KERATOSIS: ICD-10-CM

## 2023-05-08 DIAGNOSIS — R01.1 CARDIAC MURMUR: ICD-10-CM

## 2023-05-08 DIAGNOSIS — K64.9 HEMORRHOIDS, UNSPECIFIED HEMORRHOID TYPE: ICD-10-CM

## 2023-05-08 DIAGNOSIS — E78.00 PURE HYPERCHOLESTEROLEMIA: ICD-10-CM

## 2023-05-08 DIAGNOSIS — C61 RECURRENT PROSTATE CANCER (HCC): ICD-10-CM

## 2023-05-08 DIAGNOSIS — Z00.00 ENCOUNTER FOR ANNUAL HEALTH EXAMINATION: ICD-10-CM

## 2023-05-08 DIAGNOSIS — G47.33 OBSTRUCTIVE SLEEP APNEA: ICD-10-CM

## 2023-05-08 DIAGNOSIS — G62.9 PERIPHERAL POLYNEUROPATHY: ICD-10-CM

## 2023-05-08 DIAGNOSIS — E11.65 TYPE 2 DIABETES MELLITUS WITH HYPERGLYCEMIA, WITHOUT LONG-TERM CURRENT USE OF INSULIN (HCC): Primary | ICD-10-CM

## 2023-05-08 DIAGNOSIS — Z86.010 HISTORY OF COLONIC POLYPS: ICD-10-CM

## 2023-05-08 DIAGNOSIS — I71.21 ANEURYSM OF ASCENDING AORTA WITHOUT RUPTURE (HCC): ICD-10-CM

## 2023-05-08 DIAGNOSIS — H61.22 EXCESSIVE CERUMEN IN LEFT EAR CANAL: ICD-10-CM

## 2023-05-08 DIAGNOSIS — L30.9 ECZEMA, UNSPECIFIED TYPE: ICD-10-CM

## 2023-05-08 DIAGNOSIS — M1A.00X0 IDIOPATHIC CHRONIC GOUT WITHOUT TOPHUS, UNSPECIFIED SITE: ICD-10-CM

## 2023-05-08 DIAGNOSIS — G47.52 REM SLEEP BEHAVIOR DISORDER: ICD-10-CM

## 2023-05-08 DIAGNOSIS — M15.9 OSTEOARTHRITIS OF MULTIPLE JOINTS, UNSPECIFIED OSTEOARTHRITIS TYPE: ICD-10-CM

## 2023-05-08 DIAGNOSIS — M05.549: ICD-10-CM

## 2023-05-08 PROBLEM — M54.50 ACUTE RIGHT-SIDED LOW BACK PAIN WITHOUT SCIATICA: Status: RESOLVED | Noted: 2020-08-17 | Resolved: 2023-05-08

## 2023-05-08 NOTE — PATIENT INSTRUCTIONS
Monostat 7 to axilla. Return for np to clean out ears   Start compression sock  Occupational therapy to hands       Tk Damon's SCREENING SCHEDULE   Tests on this list are recommended by your physician but may not be covered, or covered at this frequency, by your insurer. Please check with your insurance carrier before scheduling to verify coverage.    PREVENTATIVE SERVICES FREQUENCY &  COVERAGE DETAILS LAST COMPLETION DATE   Diabetes Screening    Fasting Blood Sugar / Glucose    One screening every 12 months if never tested or if previously tested but not diagnosed with pre-diabetes   One screening every 6 months if diagnosed with pre-diabetes Lab Results   Component Value Date     (H) 04/24/2023        Cardiovascular Disease Screening    Lipid Panel  Cholesterol  Lipoprotein (HDL)  Triglycerides Covered every 5 years for all Medicare beneficiaries without apparent signs or symptoms of cardiovascular disease Lab Results   Component Value Date    CHOLEST 162 10/13/2022    HDL 47 10/13/2022    LDL 90 10/13/2022    TRIG 144 10/13/2022         Electrocardiogram (EKG)   Covered if needed at Welcome to Medicare, and non-screening if indicated for medical reasons 06/22/2022      Ultrasound Screening for Abdominal Aortic Aneurysm (AAA) Covered once in a lifetime for one of the following risk factors    Men who are 73-68 years old and have ever smoked    Anyone with a family history -     Colorectal Cancer Screening  Covered for ages 52-80; only need ONE of the following:    Colonoscopy   Covered every 10 years    Covered every 2 years if patient is at high risk or previous colonoscopy was abnormal 05/13/2019    Colorectal Cancer Screening due on 05/13/2024    Flexible Sigmoidoscopy   Covered every 4 years -    Fecal Occult Blood Test Covered annually -   Prostate Cancer Screening    Prostate-Specific Antigen (PSA) Annually Lab Results   Component Value Date    PSA 0.01 04/24/2023     There are no preventive care reminders to display for this patient. Immunizations    Influenza Covered once per flu season  Please get every year 10/16/2022  No recommendations at this time    Pneumococcal Each vaccine ([id number] & Acnlfucdu57) covered once after 65 Prevnar 13: 11/04/2014    [id number]: 12/01/2010     No recommendations at this time    Hepatitis B One screening covered for patients with certain risk factors   12/18/2002  No recommendations at this time    Tetanus Toxoid Not covered by Medicare Part B unless medically necessary (cut with metal); may be covered with your pharmacy prescription benefits 04/05/2019    Tetanus, Diptheria and Pertusis TD and TDaP Not covered by Medicare Part B -  No recommendations at this time    Zoster Not covered by Medicare Part B; may be covered with your pharmacy  prescription benefits 02/27/2009  Zoster Vaccines(1 of 2) due on 04/24/2009       Diabetes      Hemoglobin A1C Annually; if last result is elevated, may be asked to retest more frequently.     Medicare covers every 3 months Lab Results   Component Value Date     (H) 03/20/2023    A1C 6.2 (H) 03/20/2023       No recommendations at this time    Creat/alb ratio Annually Lab Results   Component Value Date    MICROALBCREA 76.3 (H) 10/13/2022       LDL Annually Lab Results   Component Value Date    LDL 90 10/13/2022       Dilated Eye Exam Annually 12/05/2022       Annual Monitoring of Persistent Medications (ACE/ARB, digoxin diuretics, anticonvulsants)    Potassium Annually Lab Results   Component Value Date    K 4.3 04/24/2023         Creatinine   Annually Lab Results   Component Value Date    CREATSERUM 0.81 04/24/2023         BUN Annually Lab Results   Component Value Date    BUN 18 04/24/2023       Drug Serum Conc Annually No results found for: DIGOXIN, DIG, VALP

## 2023-05-11 ENCOUNTER — PATIENT MESSAGE (OUTPATIENT)
Dept: FAMILY MEDICINE CLINIC | Facility: CLINIC | Age: 78
End: 2023-05-11

## 2023-05-11 NOTE — TELEPHONE ENCOUNTER
From: Sushila Kovacs  To: Ruth Farias MD  Sent: 5/11/2023 2:29 PM CDT  Subject: Ghada Saucedo is no longer with [facility]. I made an evaluation appt May 19 with Finn Suggs     I made an evaluation appointment with Morales Pearson for May 19. I will keep it unless you think I need to try to find out where [place] went. Thanks.   Keiry Ceron

## 2023-05-12 ENCOUNTER — OFFICE VISIT (OUTPATIENT)
Dept: FAMILY MEDICINE CLINIC | Facility: CLINIC | Age: 78
End: 2023-05-12
Payer: MEDICARE

## 2023-05-12 VITALS
DIASTOLIC BLOOD PRESSURE: 82 MMHG | SYSTOLIC BLOOD PRESSURE: 102 MMHG | HEIGHT: 71.5 IN | HEART RATE: 87 BPM | BODY MASS INDEX: 35.74 KG/M2 | RESPIRATION RATE: 16 BRPM | TEMPERATURE: 97 F | WEIGHT: 261 LBS | OXYGEN SATURATION: 99 %

## 2023-05-12 DIAGNOSIS — H61.22 IMPACTED CERUMEN OF LEFT EAR: Primary | ICD-10-CM

## 2023-05-12 PROCEDURE — 99213 OFFICE O/P EST LOW 20 MIN: CPT | Performed by: NURSE PRACTITIONER

## 2023-06-02 ENCOUNTER — HOSPITAL ENCOUNTER (OUTPATIENT)
Dept: CT IMAGING | Facility: HOSPITAL | Age: 78
Discharge: HOME | End: 2023-06-02
Attending: UROLOGY
Payer: MEDICARE

## 2023-06-02 DIAGNOSIS — N13.30 HYDRONEPHROSIS OF LEFT KIDNEY: ICD-10-CM

## 2023-06-02 LAB
CREAT BLD-MCNC: 0.8 MG/DL
GFR SERPLBLD BASED ON 1.73 SQ M-ARVRAT: 91 ML/MIN/1.73M2 (ref 60–?)

## 2023-06-02 PROCEDURE — 82565 ASSAY OF CREATININE: CPT

## 2023-06-02 PROCEDURE — 74178 CT ABD&PLV WO CNTR FLWD CNTR: CPT | Performed by: UROLOGY

## 2023-06-02 PROCEDURE — 76377 3D RENDER W/INTRP POSTPROCES: CPT | Performed by: UROLOGY

## 2023-06-12 ENCOUNTER — OFFICE VISIT (OUTPATIENT)
Dept: SURGERY | Facility: CLINIC | Age: 78
End: 2023-06-12

## 2023-06-12 ENCOUNTER — HOSPITAL ENCOUNTER (OUTPATIENT)
Dept: CV DIAGNOSTICS | Facility: HOSPITAL | Age: 78
Discharge: HOME | End: 2023-06-12
Attending: FAMILY MEDICINE
Payer: MEDICARE

## 2023-06-12 DIAGNOSIS — R31.0 GROSS HEMATURIA: ICD-10-CM

## 2023-06-12 DIAGNOSIS — C61 PROSTATE CANCER (HCC): Primary | ICD-10-CM

## 2023-06-12 DIAGNOSIS — R33.9 URINARY RETENTION: ICD-10-CM

## 2023-06-12 DIAGNOSIS — I71.21 ANEURYSM OF ASCENDING AORTA WITHOUT RUPTURE (HCC): ICD-10-CM

## 2023-06-12 DIAGNOSIS — R01.1 CARDIAC MURMUR: ICD-10-CM

## 2023-06-12 DIAGNOSIS — N13.30 HYDRONEPHROSIS OF LEFT KIDNEY: ICD-10-CM

## 2023-06-12 DIAGNOSIS — I44.0 FIRST DEGREE ATRIOVENTRICULAR BLOCK: ICD-10-CM

## 2023-06-12 DIAGNOSIS — N39.490 OVERFLOW INCONTINENCE: ICD-10-CM

## 2023-06-12 LAB
APPEARANCE: CLEAR
BILIRUBIN: NEGATIVE
GLUCOSE (URINE DIPSTICK): NEGATIVE MG/DL
KETONES (URINE DIPSTICK): NEGATIVE MG/DL
LEUKOCYTES: NEGATIVE
MULTISTIX LOT#: NORMAL NUMERIC
NITRITE, URINE: NEGATIVE
OCCULT BLOOD: NEGATIVE
PH, URINE: 5.5 (ref 4.5–8)
PROTEIN (URINE DIPSTICK): NEGATIVE MG/DL
SPECIFIC GRAVITY: 1.02 (ref 1–1.03)
URINE-COLOR: YELLOW
UROBILINOGEN,SEMI-QN: 0.2 MG/DL (ref 0–1.9)

## 2023-06-12 PROCEDURE — 93306 TTE W/DOPPLER COMPLETE: CPT | Performed by: FAMILY MEDICINE

## 2023-06-15 ENCOUNTER — LABORATORY ENCOUNTER (OUTPATIENT)
Dept: LAB | Age: 78
End: 2023-06-15
Attending: FAMILY MEDICINE
Payer: MEDICARE

## 2023-06-15 DIAGNOSIS — C61 RECURRENT PROSTATE CANCER (HCC): ICD-10-CM

## 2023-06-15 DIAGNOSIS — G62.9 PERIPHERAL POLYNEUROPATHY: ICD-10-CM

## 2023-06-15 DIAGNOSIS — E53.8 VITAMIN B12 DEFICIENCY: ICD-10-CM

## 2023-06-15 DIAGNOSIS — E78.00 HYPERCHOLESTEROLEMIA: ICD-10-CM

## 2023-06-15 DIAGNOSIS — E11.65 TYPE 2 DIABETES MELLITUS WITH HYPERGLYCEMIA, WITHOUT LONG-TERM CURRENT USE OF INSULIN (HCC): ICD-10-CM

## 2023-06-15 DIAGNOSIS — Z11.59 NEED FOR HEPATITIS C SCREENING TEST: ICD-10-CM

## 2023-06-15 DIAGNOSIS — E55.9 VITAMIN D DEFICIENCY: ICD-10-CM

## 2023-06-15 DIAGNOSIS — I10 PRIMARY HYPERTENSION: ICD-10-CM

## 2023-06-15 DIAGNOSIS — E78.00 PURE HYPERCHOLESTEROLEMIA: ICD-10-CM

## 2023-06-15 LAB
ALBUMIN SERPL-MCNC: 4 G/DL (ref 3.4–5)
ALBUMIN/GLOB SERPL: 1.1 {RATIO} (ref 1–2)
ALP LIVER SERPL-CCNC: 104 U/L
ALT SERPL-CCNC: 22 U/L
ANION GAP SERPL CALC-SCNC: 5 MMOL/L (ref 0–18)
AST SERPL-CCNC: 21 U/L (ref 15–37)
BASOPHILS # BLD AUTO: 0.03 X10(3) UL (ref 0–0.2)
BASOPHILS NFR BLD AUTO: 0.6 %
BILIRUB SERPL-MCNC: 0.6 MG/DL (ref 0.1–2)
BILIRUB UR QL STRIP.AUTO: NEGATIVE
BUN BLD-MCNC: 18 MG/DL (ref 7–18)
CALCIUM BLD-MCNC: 10 MG/DL (ref 8.5–10.1)
CHLORIDE SERPL-SCNC: 106 MMOL/L (ref 98–112)
CHOLEST SERPL-MCNC: 173 MG/DL (ref ?–200)
CK SERPL-CCNC: 156 U/L
CLARITY UR REFRACT.AUTO: CLEAR
CO2 SERPL-SCNC: 27 MMOL/L (ref 21–32)
COLOR UR AUTO: YELLOW
CREAT BLD-MCNC: 0.98 MG/DL
CREAT UR-SCNC: 75.6 MG/DL
DEPRECATED HBV CORE AB SER IA-ACNC: 157.8 NG/ML
EOSINOPHIL # BLD AUTO: 0.25 X10(3) UL (ref 0–0.7)
EOSINOPHIL NFR BLD AUTO: 4.7 %
ERYTHROCYTE [DISTWIDTH] IN BLOOD BY AUTOMATED COUNT: 14.5 %
EST. AVERAGE GLUCOSE BLD GHB EST-MCNC: 126 MG/DL (ref 68–126)
FASTING PATIENT LIPID ANSWER: YES
FASTING STATUS PATIENT QL REPORTED: YES
GFR SERPLBLD BASED ON 1.73 SQ M-ARVRAT: 79 ML/MIN/1.73M2 (ref 60–?)
GLOBULIN PLAS-MCNC: 3.5 G/DL (ref 2.8–4.4)
GLUCOSE BLD-MCNC: 108 MG/DL (ref 70–99)
GLUCOSE UR STRIP.AUTO-MCNC: NEGATIVE MG/DL
HBA1C MFR BLD: 6 % (ref ?–5.7)
HCT VFR BLD AUTO: 40.4 %
HCV AB SERPL QL IA: NONREACTIVE
HDLC SERPL-MCNC: 44 MG/DL (ref 40–59)
HGB BLD-MCNC: 13 G/DL
IMM GRANULOCYTES # BLD AUTO: 0.01 X10(3) UL (ref 0–1)
IMM GRANULOCYTES NFR BLD: 0.2 %
IRON SATN MFR SERPL: 19 %
IRON SERPL-MCNC: 70 UG/DL
KETONES UR STRIP.AUTO-MCNC: NEGATIVE MG/DL
LDLC SERPL CALC-MCNC: 107 MG/DL (ref ?–100)
LEUKOCYTE ESTERASE UR QL STRIP.AUTO: NEGATIVE
LYMPHOCYTES # BLD AUTO: 0.69 X10(3) UL (ref 1–4)
LYMPHOCYTES NFR BLD AUTO: 13 %
MAGNESIUM SERPL-MCNC: 2.3 MG/DL (ref 1.6–2.6)
MCH RBC QN AUTO: 30.5 PG (ref 26–34)
MCHC RBC AUTO-ENTMCNC: 32.2 G/DL (ref 31–37)
MCV RBC AUTO: 94.8 FL
MICROALBUMIN UR-MCNC: 0.75 MG/DL
MICROALBUMIN/CREAT 24H UR-RTO: 9.9 UG/MG (ref ?–30)
MONOCYTES # BLD AUTO: 0.48 X10(3) UL (ref 0.1–1)
MONOCYTES NFR BLD AUTO: 9.1 %
NEUTROPHILS # BLD AUTO: 3.84 X10 (3) UL (ref 1.5–7.7)
NEUTROPHILS # BLD AUTO: 3.84 X10(3) UL (ref 1.5–7.7)
NEUTROPHILS NFR BLD AUTO: 72.4 %
NITRITE UR QL STRIP.AUTO: NEGATIVE
NONHDLC SERPL-MCNC: 129 MG/DL (ref ?–130)
OSMOLALITY SERPL CALC.SUM OF ELEC: 288 MOSM/KG (ref 275–295)
PH UR STRIP.AUTO: 5 [PH] (ref 5–8)
PLATELET # BLD AUTO: 221 10(3)UL (ref 150–450)
POTASSIUM SERPL-SCNC: 4.1 MMOL/L (ref 3.5–5.1)
PROT SERPL-MCNC: 7.5 G/DL (ref 6.4–8.2)
PROT UR STRIP.AUTO-MCNC: NEGATIVE MG/DL
PSA SERPL-MCNC: <0.01 NG/ML (ref ?–4)
RBC # BLD AUTO: 4.26 X10(6)UL
RBC UR QL AUTO: NEGATIVE
SODIUM SERPL-SCNC: 138 MMOL/L (ref 136–145)
SP GR UR STRIP.AUTO: 1.01 (ref 1–1.03)
TIBC SERPL-MCNC: 365 UG/DL (ref 240–450)
TRANSFERRIN SERPL-MCNC: 245 MG/DL (ref 200–360)
TRIGL SERPL-MCNC: 125 MG/DL (ref 30–149)
TSI SER-ACNC: 1.35 MIU/ML (ref 0.36–3.74)
URATE SERPL-MCNC: 5.7 MG/DL
UROBILINOGEN UR STRIP.AUTO-MCNC: <2 MG/DL
VIT B12 SERPL-MCNC: 720 PG/ML (ref 193–986)
VIT D+METAB SERPL-MCNC: 57.8 NG/ML (ref 30–100)
VLDLC SERPL CALC-MCNC: 21 MG/DL (ref 0–30)
WBC # BLD AUTO: 5.3 X10(3) UL (ref 4–11)

## 2023-06-15 PROCEDURE — 84153 ASSAY OF PSA TOTAL: CPT

## 2023-06-15 PROCEDURE — 84550 ASSAY OF BLOOD/URIC ACID: CPT

## 2023-06-15 PROCEDURE — 82607 VITAMIN B-12: CPT

## 2023-06-15 PROCEDURE — 85025 COMPLETE CBC W/AUTO DIFF WBC: CPT

## 2023-06-15 PROCEDURE — 80061 LIPID PANEL: CPT

## 2023-06-15 PROCEDURE — 81003 URINALYSIS AUTO W/O SCOPE: CPT

## 2023-06-15 PROCEDURE — 82306 VITAMIN D 25 HYDROXY: CPT

## 2023-06-15 PROCEDURE — 83540 ASSAY OF IRON: CPT

## 2023-06-15 PROCEDURE — 83735 ASSAY OF MAGNESIUM: CPT

## 2023-06-15 PROCEDURE — 86803 HEPATITIS C AB TEST: CPT

## 2023-06-15 PROCEDURE — 82550 ASSAY OF CK (CPK): CPT

## 2023-06-15 PROCEDURE — 82570 ASSAY OF URINE CREATININE: CPT

## 2023-06-15 PROCEDURE — 83550 IRON BINDING TEST: CPT

## 2023-06-15 PROCEDURE — 80053 COMPREHEN METABOLIC PANEL: CPT

## 2023-06-15 PROCEDURE — 84443 ASSAY THYROID STIM HORMONE: CPT

## 2023-06-15 PROCEDURE — 82728 ASSAY OF FERRITIN: CPT

## 2023-06-15 PROCEDURE — 82043 UR ALBUMIN QUANTITATIVE: CPT

## 2023-06-15 PROCEDURE — 36415 COLL VENOUS BLD VENIPUNCTURE: CPT

## 2023-06-15 PROCEDURE — 83036 HEMOGLOBIN GLYCOSYLATED A1C: CPT

## 2023-06-16 DIAGNOSIS — E78.00 PURE HYPERCHOLESTEROLEMIA: ICD-10-CM

## 2023-06-16 RX ORDER — ATORVASTATIN CALCIUM 20 MG/1
20 TABLET, FILM COATED ORAL EVERY EVENING
Qty: 90 TABLET | Refills: 0 | Status: SHIPPED | OUTPATIENT
Start: 2023-06-16

## 2023-06-16 RX ORDER — ALLOPURINOL 300 MG/1
300 TABLET ORAL DAILY
Qty: 90 TABLET | Refills: 0 | Status: SHIPPED | OUTPATIENT
Start: 2023-06-16

## 2023-06-16 NOTE — TELEPHONE ENCOUNTER
Future appt: Your appointments     Date & Time Appointment Department [facility])    Jun 19, 2023  3:20 PM CDT Follow up - Extended with MD Tonya Joy Lieutenant Decant ([facility])        Dec 13, 2023  9:30 AM CST Follow Up Visit with Cyrus Garcia MD [address], [address] Varun Holden ([address] [address])            Loli Dubois [address]  [address] [place] [address] Dr Calderon [address] [address] [place] [address] 21  Gregor Logan  [place] [phone]  [phone]        Last Appointment with provider:   5/8/2023 for annual physical.  Last appointment at [facility] [place]:  5/12/2023  Cholesterol, Total (mg/dL)   Date Value   06/15/2023 173     HDL Cholesterol (mg/dL)   Date Value   06/15/2023 44     LDL Cholesterol (mg/dL)   Date Value   06/15/2023 107 (H)     Triglycerides (mg/dL)   Date Value   06/15/2023 125     Lab Results   Component Value Date     06/15/2023    A1C 6.0 (H) 06/15/2023     Lab Results   Component Value Date    TSH 1.350 06/15/2023       No follow-ups on file.

## 2023-06-19 ENCOUNTER — OFFICE VISIT (OUTPATIENT)
Dept: FAMILY MEDICINE CLINIC | Facility: CLINIC | Age: 78
End: 2023-06-19
Payer: MEDICARE

## 2023-06-19 VITALS
HEART RATE: 75 BPM | SYSTOLIC BLOOD PRESSURE: 120 MMHG | BODY MASS INDEX: 35.6 KG/M2 | DIASTOLIC BLOOD PRESSURE: 70 MMHG | TEMPERATURE: 98 F | OXYGEN SATURATION: 98 % | WEIGHT: 260 LBS | RESPIRATION RATE: 18 BRPM | HEIGHT: 71.5 IN

## 2023-06-19 DIAGNOSIS — G47.33 OSA (OBSTRUCTIVE SLEEP APNEA): Primary | ICD-10-CM

## 2023-06-19 PROCEDURE — 99214 OFFICE O/P EST MOD 30 MIN: CPT | Performed by: FAMILY MEDICINE

## 2023-07-17 ENCOUNTER — LABORATORY ENCOUNTER (OUTPATIENT)
Dept: LAB | Age: 78
End: 2023-07-17
Attending: INTERNAL MEDICINE
Payer: MEDICARE

## 2023-07-17 DIAGNOSIS — C61 RECURRENT PROSTATE CANCER (HCC): ICD-10-CM

## 2023-07-17 LAB
ALBUMIN SERPL-MCNC: 3.7 G/DL (ref 3.4–5)
ALBUMIN/GLOB SERPL: 1.1 {RATIO} (ref 1–2)
ALP LIVER SERPL-CCNC: 100 U/L
ALT SERPL-CCNC: 23 U/L
ANION GAP SERPL CALC-SCNC: 5 MMOL/L (ref 0–18)
AST SERPL-CCNC: 19 U/L (ref 15–37)
BASOPHILS # BLD AUTO: 0.03 X10(3) UL (ref 0–0.2)
BASOPHILS NFR BLD AUTO: 0.6 %
BILIRUB SERPL-MCNC: 0.4 MG/DL (ref 0.1–2)
BUN BLD-MCNC: 17 MG/DL (ref 7–18)
CALCIUM BLD-MCNC: 9.7 MG/DL (ref 8.5–10.1)
CHLORIDE SERPL-SCNC: 106 MMOL/L (ref 98–112)
CO2 SERPL-SCNC: 28 MMOL/L (ref 21–32)
CREAT BLD-MCNC: 0.92 MG/DL
EOSINOPHIL # BLD AUTO: 0.33 X10(3) UL (ref 0–0.7)
EOSINOPHIL NFR BLD AUTO: 7.1 %
ERYTHROCYTE [DISTWIDTH] IN BLOOD BY AUTOMATED COUNT: 14.5 %
FASTING STATUS PATIENT QL REPORTED: NO
GFR SERPLBLD BASED ON 1.73 SQ M-ARVRAT: 86 ML/MIN/1.73M2 (ref 60–?)
GLOBULIN PLAS-MCNC: 3.5 G/DL (ref 2.8–4.4)
GLUCOSE BLD-MCNC: 164 MG/DL (ref 70–99)
HCT VFR BLD AUTO: 38 %
HGB BLD-MCNC: 12.6 G/DL
IMM GRANULOCYTES # BLD AUTO: 0.02 X10(3) UL (ref 0–1)
IMM GRANULOCYTES NFR BLD: 0.4 %
LYMPHOCYTES # BLD AUTO: 0.81 X10(3) UL (ref 1–4)
LYMPHOCYTES NFR BLD AUTO: 17.4 %
MCH RBC QN AUTO: 30.8 PG (ref 26–34)
MCHC RBC AUTO-ENTMCNC: 33.2 G/DL (ref 31–37)
MCV RBC AUTO: 92.9 FL
MONOCYTES # BLD AUTO: 0.39 X10(3) UL (ref 0.1–1)
MONOCYTES NFR BLD AUTO: 8.4 %
NEUTROPHILS # BLD AUTO: 3.07 X10 (3) UL (ref 1.5–7.7)
NEUTROPHILS # BLD AUTO: 3.07 X10(3) UL (ref 1.5–7.7)
NEUTROPHILS NFR BLD AUTO: 66.1 %
OSMOLALITY SERPL CALC.SUM OF ELEC: 293 MOSM/KG (ref 275–295)
PLATELET # BLD AUTO: 208 10(3)UL (ref 150–450)
POTASSIUM SERPL-SCNC: 3.9 MMOL/L (ref 3.5–5.1)
PROT SERPL-MCNC: 7.2 G/DL (ref 6.4–8.2)
PSA SERPL-MCNC: <0.01 NG/ML (ref ?–4)
RBC # BLD AUTO: 4.09 X10(6)UL
SODIUM SERPL-SCNC: 139 MMOL/L (ref 136–145)
WBC # BLD AUTO: 4.7 X10(3) UL (ref 4–11)

## 2023-07-17 PROCEDURE — 80053 COMPREHEN METABOLIC PANEL: CPT

## 2023-07-17 PROCEDURE — 84153 ASSAY OF PSA TOTAL: CPT

## 2023-07-17 PROCEDURE — 85025 COMPLETE CBC W/AUTO DIFF WBC: CPT

## 2023-07-17 PROCEDURE — 36415 COLL VENOUS BLD VENIPUNCTURE: CPT

## 2023-08-17 ENCOUNTER — LABORATORY ENCOUNTER (OUTPATIENT)
Dept: LAB | Age: 78
End: 2023-08-17
Attending: FAMILY MEDICINE
Payer: MEDICARE

## 2023-08-17 DIAGNOSIS — C61 RECURRENT PROSTATE CANCER (HCC): ICD-10-CM

## 2023-08-17 LAB
BASOPHILS # BLD AUTO: 0.03 X10(3) UL (ref 0–0.2)
BASOPHILS NFR BLD AUTO: 0.4 %
EOSINOPHIL # BLD AUTO: 0.35 X10(3) UL (ref 0–0.7)
EOSINOPHIL NFR BLD AUTO: 5 %
ERYTHROCYTE [DISTWIDTH] IN BLOOD BY AUTOMATED COUNT: 14.3 %
HCT VFR BLD AUTO: 41.1 %
HGB BLD-MCNC: 13.4 G/DL
IMM GRANULOCYTES # BLD AUTO: 0.02 X10(3) UL (ref 0–1)
IMM GRANULOCYTES NFR BLD: 0.3 %
LYMPHOCYTES # BLD AUTO: 1.15 X10(3) UL (ref 1–4)
LYMPHOCYTES NFR BLD AUTO: 16.6 %
MCH RBC QN AUTO: 30.1 PG (ref 26–34)
MCHC RBC AUTO-ENTMCNC: 32.6 G/DL (ref 31–37)
MCV RBC AUTO: 92.4 FL
MONOCYTES # BLD AUTO: 0.64 X10(3) UL (ref 0.1–1)
MONOCYTES NFR BLD AUTO: 9.2 %
NEUTROPHILS # BLD AUTO: 4.75 X10 (3) UL (ref 1.5–7.7)
NEUTROPHILS # BLD AUTO: 4.75 X10(3) UL (ref 1.5–7.7)
NEUTROPHILS NFR BLD AUTO: 68.5 %
PLATELET # BLD AUTO: 221 10(3)UL (ref 150–450)
RBC # BLD AUTO: 4.45 X10(6)UL
WBC # BLD AUTO: 6.9 X10(3) UL (ref 4–11)

## 2023-08-17 PROCEDURE — 80053 COMPREHEN METABOLIC PANEL: CPT

## 2023-08-17 PROCEDURE — 85025 COMPLETE CBC W/AUTO DIFF WBC: CPT

## 2023-08-17 PROCEDURE — 84153 ASSAY OF PSA TOTAL: CPT

## 2023-08-17 PROCEDURE — 36415 COLL VENOUS BLD VENIPUNCTURE: CPT

## 2023-08-18 LAB
ALBUMIN SERPL-MCNC: 3.8 G/DL (ref 3.4–5)
ALBUMIN/GLOB SERPL: 1.1 {RATIO} (ref 1–2)
ALP LIVER SERPL-CCNC: 100 U/L
ALT SERPL-CCNC: 29 U/L
ANION GAP SERPL CALC-SCNC: 6 MMOL/L (ref 0–18)
AST SERPL-CCNC: 21 U/L (ref 15–37)
BILIRUB SERPL-MCNC: 0.6 MG/DL (ref 0.1–2)
BUN BLD-MCNC: 18 MG/DL (ref 7–18)
CALCIUM BLD-MCNC: 9.4 MG/DL (ref 8.5–10.1)
CHLORIDE SERPL-SCNC: 106 MMOL/L (ref 98–112)
CO2 SERPL-SCNC: 25 MMOL/L (ref 21–32)
CREAT BLD-MCNC: 0.98 MG/DL
EGFRCR SERPLBLD CKD-EPI 2021: 79 ML/MIN/1.73M2 (ref 60–?)
FASTING STATUS PATIENT QL REPORTED: NO
GLOBULIN PLAS-MCNC: 3.6 G/DL (ref 2.8–4.4)
GLUCOSE BLD-MCNC: 94 MG/DL (ref 70–99)
OSMOLALITY SERPL CALC.SUM OF ELEC: 286 MOSM/KG (ref 275–295)
POTASSIUM SERPL-SCNC: 3.8 MMOL/L (ref 3.5–5.1)
PROT SERPL-MCNC: 7.4 G/DL (ref 6.4–8.2)
PSA SERPL-MCNC: <0.01 NG/ML (ref ?–4)
SODIUM SERPL-SCNC: 137 MMOL/L (ref 136–145)

## 2023-09-01 DIAGNOSIS — I10 PRIMARY HYPERTENSION: Primary | ICD-10-CM

## 2023-09-01 RX ORDER — CLONIDINE HYDROCHLORIDE 0.1 MG/1
0.1 TABLET ORAL 2 TIMES DAILY
Qty: 270 TABLET | Refills: 0 | Status: SHIPPED | OUTPATIENT
Start: 2023-09-01

## 2023-09-05 DIAGNOSIS — E78.00 PURE HYPERCHOLESTEROLEMIA: ICD-10-CM

## 2023-09-05 RX ORDER — ATORVASTATIN CALCIUM 20 MG/1
20 TABLET, FILM COATED ORAL EVERY EVENING
Qty: 90 TABLET | Refills: 0 | Status: SHIPPED | OUTPATIENT
Start: 2023-09-05

## 2023-09-05 RX ORDER — ALLOPURINOL 300 MG/1
300 TABLET ORAL DAILY
Qty: 90 TABLET | Refills: 0 | Status: SHIPPED | OUTPATIENT
Start: 2023-09-05

## 2023-09-05 NOTE — TELEPHONE ENCOUNTER
Future appt: Your appointments       Date & Time Appointment Department [facility])    Sep 18, 2023  8:30 AM CDT Laboratory Visit with REF Rasheeda Degroot Dalton ([facility] [place])        Sep 25, 2023  3:40 PM CDT [facility] [address] [facility]    Sleep Patients:  Please bring your PAP device (no mask/hose) to each appointment unless instructed otherwise. Dec 13, 2023  9:30 AM CST Follow Up Visit with MD Yamila Lucia [facility], [address] [facility], Varun ([address] [address])              Rasheeda Oh, [address] [facility] [place]  [id number]  [facility] [address] [place] [address], [address] [place] [address]  [address] [facility] [address]  [phone] [address] [facility] [place]  [place] [address] [phone]  [phone]          Last Appointment with provider:   6/19/2023 for diabetes follow up  Last appointment at [facility] [place]:  6/19/2023  Cholesterol, Total (mg/dL)   Date Value   06/15/2023 173     HDL Cholesterol (mg/dL)   Date Value   06/15/2023 44     LDL Cholesterol (mg/dL)   Date Value   06/15/2023 107 (H)     Triglycerides (mg/dL)   Date Value   06/15/2023 125     Lab Results   Component Value Date     06/15/2023    A1C 6.0 (H) 06/15/2023     Lab Results   Component Value Date    TSH 1.350 06/15/2023       No follow-ups on file.

## 2023-09-18 ENCOUNTER — LABORATORY ENCOUNTER (OUTPATIENT)
Dept: LAB | Age: 78
End: 2023-09-18
Attending: FAMILY MEDICINE
Payer: MEDICARE

## 2023-09-18 DIAGNOSIS — C61 RECURRENT PROSTATE CANCER (HCC): ICD-10-CM

## 2023-09-18 LAB
ALBUMIN SERPL-MCNC: 4 G/DL (ref 3.4–5)
ALBUMIN/GLOB SERPL: 1.3 {RATIO} (ref 1–2)
ALP LIVER SERPL-CCNC: 95 U/L
ALT SERPL-CCNC: 28 U/L
ANION GAP SERPL CALC-SCNC: 6 MMOL/L (ref 0–18)
AST SERPL-CCNC: 18 U/L (ref 15–37)
BASOPHILS # BLD AUTO: 0.04 X10(3) UL (ref 0–0.2)
BASOPHILS NFR BLD AUTO: 0.8 %
BILIRUB SERPL-MCNC: 0.3 MG/DL (ref 0.1–2)
BUN BLD-MCNC: 14 MG/DL (ref 7–18)
CALCIUM BLD-MCNC: 9.5 MG/DL (ref 8.5–10.1)
CHLORIDE SERPL-SCNC: 106 MMOL/L (ref 98–112)
CO2 SERPL-SCNC: 28 MMOL/L (ref 21–32)
CREAT BLD-MCNC: 1 MG/DL
EGFRCR SERPLBLD CKD-EPI 2021: 77 ML/MIN/1.73M2 (ref 60–?)
EOSINOPHIL # BLD AUTO: 0.39 X10(3) UL (ref 0–0.7)
EOSINOPHIL NFR BLD AUTO: 7.7 %
ERYTHROCYTE [DISTWIDTH] IN BLOOD BY AUTOMATED COUNT: 14.2 %
FASTING STATUS PATIENT QL REPORTED: NO
GLOBULIN PLAS-MCNC: 3.1 G/DL (ref 2.8–4.4)
GLUCOSE BLD-MCNC: 135 MG/DL (ref 70–99)
HCT VFR BLD AUTO: 40.4 %
HGB BLD-MCNC: 13.2 G/DL
IMM GRANULOCYTES # BLD AUTO: 0.02 X10(3) UL (ref 0–1)
IMM GRANULOCYTES NFR BLD: 0.4 %
LYMPHOCYTES # BLD AUTO: 1.04 X10(3) UL (ref 1–4)
LYMPHOCYTES NFR BLD AUTO: 20.5 %
MCH RBC QN AUTO: 31.2 PG (ref 26–34)
MCHC RBC AUTO-ENTMCNC: 32.7 G/DL (ref 31–37)
MCV RBC AUTO: 95.5 FL
MONOCYTES # BLD AUTO: 0.45 X10(3) UL (ref 0.1–1)
MONOCYTES NFR BLD AUTO: 8.9 %
NEUTROPHILS # BLD AUTO: 3.13 X10 (3) UL (ref 1.5–7.7)
NEUTROPHILS # BLD AUTO: 3.13 X10(3) UL (ref 1.5–7.7)
NEUTROPHILS NFR BLD AUTO: 61.7 %
OSMOLALITY SERPL CALC.SUM OF ELEC: 293 MOSM/KG (ref 275–295)
PLATELET # BLD AUTO: 207 10(3)UL (ref 150–450)
POTASSIUM SERPL-SCNC: 4.2 MMOL/L (ref 3.5–5.1)
PROT SERPL-MCNC: 7.1 G/DL (ref 6.4–8.2)
PSA SERPL-MCNC: <0.01 NG/ML (ref ?–4)
RBC # BLD AUTO: 4.23 X10(6)UL
SODIUM SERPL-SCNC: 140 MMOL/L (ref 136–145)
WBC # BLD AUTO: 5.1 X10(3) UL (ref 4–11)

## 2023-09-18 PROCEDURE — 85025 COMPLETE CBC W/AUTO DIFF WBC: CPT

## 2023-09-18 PROCEDURE — 36415 COLL VENOUS BLD VENIPUNCTURE: CPT

## 2023-09-18 PROCEDURE — 84153 ASSAY OF PSA TOTAL: CPT

## 2023-09-18 PROCEDURE — 80053 COMPREHEN METABOLIC PANEL: CPT

## 2023-09-19 ENCOUNTER — TELEPHONE (OUTPATIENT)
Dept: HEMATOLOGY/ONCOLOGY | Facility: HOSPITAL | Age: 78
End: 2023-09-19

## 2023-09-25 ENCOUNTER — OFFICE VISIT (OUTPATIENT)
Dept: FAMILY MEDICINE CLINIC | Facility: CLINIC | Age: 78
End: 2023-09-25
Payer: MEDICARE

## 2023-09-25 VITALS
RESPIRATION RATE: 18 BRPM | WEIGHT: 264.81 LBS | TEMPERATURE: 98 F | HEART RATE: 77 BPM | HEIGHT: 71.5 IN | DIASTOLIC BLOOD PRESSURE: 80 MMHG | OXYGEN SATURATION: 98 % | SYSTOLIC BLOOD PRESSURE: 118 MMHG | BODY MASS INDEX: 36.26 KG/M2

## 2023-09-25 DIAGNOSIS — G47.33 OSA (OBSTRUCTIVE SLEEP APNEA): Primary | ICD-10-CM

## 2023-09-25 PROCEDURE — 99214 OFFICE O/P EST MOD 30 MIN: CPT | Performed by: FAMILY MEDICINE

## 2023-10-09 ENCOUNTER — OFFICE VISIT (OUTPATIENT)
Dept: HEMATOLOGY/ONCOLOGY | Facility: HOSPITAL | Age: 78
End: 2023-10-09
Attending: INTERNAL MEDICINE
Payer: MEDICARE

## 2023-10-09 VITALS
DIASTOLIC BLOOD PRESSURE: 82 MMHG | WEIGHT: 265.19 LBS | SYSTOLIC BLOOD PRESSURE: 143 MMHG | OXYGEN SATURATION: 97 % | BODY MASS INDEX: 36 KG/M2 | HEART RATE: 84 BPM | RESPIRATION RATE: 18 BRPM | TEMPERATURE: 98 F

## 2023-10-09 DIAGNOSIS — C61 PROSTATE CANCER (HCC): Primary | ICD-10-CM

## 2023-10-09 PROCEDURE — 99214 OFFICE O/P EST MOD 30 MIN: CPT | Performed by: INTERNAL MEDICINE

## 2023-10-09 NOTE — PROGRESS NOTES
Pt here for follow up. He is taking xtandi. No new complaints.     Outpatient Oncology Care Plan  Problem list:  knowledge deficit    Problems related to:    disease/disease progression    Interventions:  provided general teaching    Expected outcomes:  understands plan of care    Progress towards outcome:  making progress    Education Record    Learner:  Patient  Barriers / Limitations:  None  Method:  Brief focused  Outcome:  Shows understanding  Comments:

## 2023-12-04 NOTE — PROGRESS NOTES
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 Following for:  1. 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 Urinary retention following biopsy on 7/18/22  - developed retention + BENNETT s/p biopsy  - failed VT x 3 and on CIC but urination is improving with PFT  - was on flomax, now rapaflo  - Cysto 11/8/22: moderate BPH with moderate FOSTER. Moderate bladder trabeculation with small bladder diverticula noted  - UDS 11/8/22: normal sensation and good detrusor contraction  2. Intermittent gross hematuria with catheterization  3. Presumed overflow incontinence despite CIC  - was > 600 mL out with caths  4. H/o kidney stones  - passed one ~ 1990    PCP - [address]  Prior Urologist - Santiago Dsouza (Aug 2022), Brendan    LOV 6/12/2023    Presents for check-up. Lost his wife in 2022. He is taking rapaflo. He last saw Dr Santiago Dsouza and long-term ADT recommended with CIC with re-eval at f/u. Prior to PFT was performing CIC 6 x per day with 300-600 mL out. He completed PFT in [place] in Jan 2023, went down to BID CIC 12/28/22 and stopped CIC completely in Feb 2023. Voids at least 5-6 time during the day. No interim CIC. Constipation has resolved with miralax and colace BID per PCP. No flank pain, hematuria, dysuria. AUA SS is 14/35 with 4 w, u; 3 n, f.  Was 13/35 with 4 n, u; 2 w, f; 1 I. Was 18/35 with 5 w, f, ESTHELA; 2 n; 1 i. Mostly happy with LUTS. Incontinence: occasionally dribbling. Using 1 PPD and damp. Not doing PFT exercises right now but not bothered. May go back to PFT in [place] later. Penoscrotal LOV: no abnormalities  DESEAN LOV: ~ 40 g, somewhat firm, non-tender    UA is negative. Has shown large blood in the past.  PVR is 52 mL - was 22 mL LOV and 226 mL prior visit and last cath was 6:30 am this morning. Was 226, 322 mL prior checks    Gross hematuria: yes with catheterizations  Tobacco hx: 35 pack years, quit 2000   Fam h/o  malignancy: none    Poor potency and prefers observation. Drinks ~ 2.5 L water, rare coffee, soda with medium yellow urine. Prior PSAs:  - < 0.01 9/18/23  - 0.01 4/24/23  - 0.06 12/19/22  - 0.30 10/17/22  - 6.72 9/19/22  - 5.38 9/6/22  - 07/18/2022 Uronav Bx: Oracio 5+4, 4+5  - 04/22/2022 MRI prostate: 37.4 cc, PI-RADS 4 at left posterior periph zone  - 1.34 4/14/2022   - 11/18/2021 PSMA PET-CT: uptake in prostate  - 1.07 11/01/21  - 0.43 4/21   - 0.19 10/20   - 0.09 5/20   - 0.07 10/19  - 0.04 7/19  - 0.02 4/19  - 0.01 1/19  - 0.03 10/18  - 07/02/2018 - 08/28/2018 salvage EBRT to pelvic nodes (prostate not re-treated)  - 04/11/2018 PSA 4.5 Lupron 45 mg  - 03/06/2018 MRI prostate: (-)  - 01/2018 Auxumin PET-CT: (+) left ext iliac nodes  - 5.01 1/18/18  - 3.99 10/17  - 3.01 7/17  - 1.80 1/17  - 1.00 7/16  - 07/09/2015, 07/23/2015 HDR brachytherapy, 27 Gy in 2 fractions  - PSA 5.0 2015    CTU 6/2/23: no hydro, simple b/l renal cysts, small liver cysts, diverticulosis, b/l adrenal adenomas    CTU 11/14/22: Report states left hydro with possible narrowing in proximal to mid ureter. I see good caliber ureter down to level of bladder so this could be reflux. UDS 11/8/22: first sensation 65 mL, first desire 111 mL, strong desire 251 mL, max bladder capacity of 310 mL. No leakage with valsalva.  The patient was not able to void with max detrusor pressure of 45 cm H2O and 400 mL PVR. He would prefer to continue rapaflo, stopped CIC, continue PFT exercises. Continue to drink > 40-60 oz water per day for UTI prevention. Continue ADT with Brody Valencia per Dr Julio Cesar Arvizu. F/u with me in 6 mo with PVR. Consider TURP later if he retains again but doing great now following PFT. Prior note: We discussed that sensation appears normal and he has good detrusor contractions. He may benefit from palliative TURP as this may decrease or eliminate need for CIC. He already has pelvic floor weakness with incontinence so I would encourage him to complete PFT first. Discussed that incontinence may worsen following TURP. Prior note: We discussed options for urinary symptoms. Ideally he should be cathing with values < 500 mL so he will increase frequency of CIC. We discussed Kegel exercises for incontinence. I provided and reviewed educational materials for this. We also discussed PFT for both bladder and bowel dysfunction and he is interested in doing this. PSA seems to have responded favorably to ADT/abiraterone and he will continue this with Dr Julio Cesar Arvizu. For retention and gross hematuria I would recommend CTU, UDS, office cysto. Discussed rationale for these tests and he is agreeable.     HISTORY:  Past Medical History:   Diagnosis Date    Allergic rhinitis 1961    Hay Fever    Cancer ([facility] [address] 2015    Prostate Cancer HDBracytherapy    Essential hypertension     Hyperlipidemia 1990    Under Medical Control    Sleep apnea 1996    Use CPAP      Past Surgical History:   Procedure Laterality Date    COLONOSCOPY  Since 1995    As needed    HERNIA SURGERY  9465    Umbilical repair    OTHER      R knee scope    OTHER SURGICAL HISTORY      basal cell carcinoma 9/15/2017 & 6/20/2016    SKIN SURGERY  2017    Basal Cell - MOHS    TONSILLECTOMY      VASECTOMY  1990      Family History   Problem Relation Age of Onset    Cancer Father         Lung Cancer    Diabetes Father Cancer Paternal Grandfather         Cancer    Cancer Sister         Breast then Ovarian Cancer    Stroke Maternal Grandmother         Survived and live 21 more yrs      Social History:   Social History     Socioeconomic History    Marital status:    Tobacco Use    Smoking status: Former     Packs/day: 1.00     Years: 35.00     Additional pack years: 0.00     Total pack years: 35.00     Types: Cigarettes, Pipe, Cigars     Quit date: 2000     Years since quittin.9    Smokeless tobacco: Never    Tobacco comments:     20yrs cigarettes, 9 yrs pipe, 6 years cigars [place] golf [place]   Vaping Use    Vaping Use: Never used   Substance and Sexual Activity    Alcohol use: Yes     Alcohol/week: 3.0 standard drinks of alcohol     Types: 3 Standard drinks or equivalent per week     Comment: a Couple of Beers with Mx, Nany and Pizza    Drug use: No   Other Topics Concern    Caffeine Concern No    Exercise No    Seat Belt No    Special Diet No    Stress Concern No    Weight Concern No   Social History Narrative    Occupation: retired former pathologist/ genetist.     Where Employed: [employer]. Job Duration (yrs): years 27    Education: phd in science    Hobbies/ Interests: likes golf and runs a golf    Pets: none, grand-dog: bulldog. Lives with: Erika, son in law, and grandson lives with him. Lives at: home    Marital Status: , ab  in     Years : 54    Number of Children: 2 children, 3 grandchild    Christianity/ Voodoo: [employer].     Other Social History Comments: none         DPOAHC: Daughter, Ana & Juan Moreno and then kassy :         no long term ventilators. Quality, better than quantity. Full code:  No long term feeding tubes. Medications (Active prior to today's visit):  Current Outpatient Medications   Medication Sig Dispense Refill    Glucose Blood (ONETOUCH VERIO) In Vitro Strip 1 Lancet by Finger stick route 2 (two) times daily. silodosin 8 MG Oral Cap Take 1 capsule (8 mg total) by mouth daily. 90 capsule 5    allopurinol 300 MG Oral Tab Take 1 tablet (300 mg total) by mouth daily. 90 tablet 0    atorvastatin 20 MG Oral Tab Take 1 tablet (20 mg total) by mouth every evening. 90 tablet 0    cloNIDine 0.1 MG Oral Tab Take 1 tablet (0.1 mg total) by mouth 2 (two) times daily. 270 tablet 0    Enzalutamide 40 MG Oral Tab Take 160 mg by mouth daily. 120 tablet 11    Olmesartan Medoxomil-HCTZ 40-25 MG Oral Tab Take 1 tablet by mouth daily. 90 tablet 3    clobetasol 0.05 % External Solution Apply 1 mL topically 2 (two) times daily. (Patient taking differently: Apply 1 mL topically as needed.) 50 mL 0    cyanocobalamin 1000 MCG Oral Tab Take 1 tablet (1,000 mcg total) by mouth daily. docusate sodium 100 MG Oral Cap Take 1 capsule (100 mg total) by mouth 2 (two) times daily as needed. Polyethylene Glycol 3350 17 GM/SCOOP Oral Powder Take 17 g by mouth daily as needed. Vitamin D3 2000 units Oral Cap Take 3 capsules (6,000 Units total) by mouth daily. 2000u three times a day      Coenzyme Q10 (CO Q 10) 100 MG Oral Cap Take 1 capsule by mouth in the morning and 1 capsule before bedtime. Omega-3 Fatty Acids (OMEGA-3 FISH OIL) 300 MG Oral Cap Take 1 capsule by mouth 2 (two) times daily. Allergies:   Allergies   Allergen Reactions    Adhesive Tape OTHER (SEE COMMENTS)     Other reaction(s): Blisters  Other reaction(s): Blisters    Aloysia Triphylla OTHER (SEE COMMENTS) and SWELLING     Lemon-verbena    Lemon-grass from tea patient gets mouth tingling/numbing    Cymbopogon TONGUE SWELLING, ANAPHYLAXIS and OTHER (SEE COMMENTS)    Mastisol Adhesive OTHER (SEE COMMENTS) and UNKNOWN     blisters    Terazosin OTHER (SEE COMMENTS) and SWELLING    Terconazole SWELLING     Nasal congestion, dizziness, edema    Terazosin Hcl      Other reaction(s): nasal congestion, dizzines,  edema    Other OTHER (SEE COMMENTS)     blister    Latex OTHER (SEE COMMENTS)     blister    Sulfa Antibiotics ITCHING and UNKNOWN     As a child         ROS:     A comprehensive 10 point review of systems was completed. Pertinent positives and negatives noted in the the HPI. PHYSICAL EXAM:     GENERAL APPEARANCE: well, developed, well nourished, in no acute distress  NEUROLOGIC: nonfocal, alert and oriented  HEAD: normocephalic, atraumatic  EYES: sclera non-icteric  EARS: hearing intact  ORAL CAVITY: mucosa moist  NECK/THYROID: no obvious goiter or masses  LUNGS: nonlabored breathing  ABDOMEN: soft, no obvious masses or tenderness  SKIN: no obvious rashes    : as noted above    ASSESSMENT/PLAN:   Diagnoses and all orders for this visit:    Urinary retention  -     URINALYSIS, AUTO, W/O SCOPE  -     silodosin 8 MG Oral Cap; Take 1 capsule (8 mg total) by mouth daily. Overflow incontinence    Prostate cancer ([address]    Hydronephrosis of left kidney    Gross hematuria    Weak urinary stream    Constipation, unspecified constipation type        - as noted above. Thanks again for this consult.     Autumn Schmidt MD, [address]  Urologist  [facility]  Office: [phone]

## 2023-12-13 ENCOUNTER — OFFICE VISIT (OUTPATIENT)
Dept: SURGERY | Facility: CLINIC | Age: 78
End: 2023-12-13
Payer: MEDICARE

## 2023-12-13 DIAGNOSIS — K59.00 CONSTIPATION, UNSPECIFIED CONSTIPATION TYPE: ICD-10-CM

## 2023-12-13 DIAGNOSIS — R33.9 URINARY RETENTION: Primary | ICD-10-CM

## 2023-12-13 DIAGNOSIS — C61 PROSTATE CANCER (HCC): ICD-10-CM

## 2023-12-13 DIAGNOSIS — N13.30 HYDRONEPHROSIS OF LEFT KIDNEY: ICD-10-CM

## 2023-12-13 DIAGNOSIS — N39.490 OVERFLOW INCONTINENCE: ICD-10-CM

## 2023-12-13 DIAGNOSIS — R39.12 WEAK URINARY STREAM: ICD-10-CM

## 2023-12-13 DIAGNOSIS — R31.0 GROSS HEMATURIA: ICD-10-CM

## 2023-12-13 PROCEDURE — 81003 URINALYSIS AUTO W/O SCOPE: CPT | Performed by: UROLOGY

## 2023-12-13 PROCEDURE — 99214 OFFICE O/P EST MOD 30 MIN: CPT | Performed by: UROLOGY

## 2023-12-13 RX ORDER — BLOOD SUGAR DIAGNOSTIC
1 STRIP MISCELLANEOUS 2 TIMES DAILY
COMMUNITY
Start: 2021-08-01

## 2023-12-13 RX ORDER — SILODOSIN 8 MG/1
8 CAPSULE ORAL DAILY
Qty: 90 CAPSULE | Refills: 5 | Status: SHIPPED | OUTPATIENT
Start: 2023-12-13

## 2023-12-21 ENCOUNTER — TELEPHONE (OUTPATIENT)
Dept: HEMATOLOGY/ONCOLOGY | Facility: HOSPITAL | Age: 78
End: 2023-12-21

## 2023-12-21 NOTE — TELEPHONE ENCOUNTER
Evelyn Aldrich would like a call back he has questions about the PSA test done here and the other at [facility]  the one here is 0.10  and at the other place 0.01. would like to know if he needs to have it done over here the [facility]. And he has more Questions about this.  Thanks Justina Dorsey

## 2023-12-21 NOTE — TELEPHONE ENCOUNTER
MD Norris Villasenor RN  Caller: Unspecified (Today, 12:09 PM)  The test there was <0.1. Which is essentially 0. Our test goes down to <0.01. It is slightly more sensitive. I am not worried about this result. But if he wants to get it drawn here, we can repeat it. BB      Pt is satisfied and will not get re drawn.

## 2024-01-30 ENCOUNTER — TELEPHONE (OUTPATIENT)
Dept: HEMATOLOGY/ONCOLOGY | Facility: HOSPITAL | Age: 79
End: 2024-01-30

## 2024-01-30 NOTE — TELEPHONE ENCOUNTER
Script is at [facility].  According to [facility] website they are trying to contact patient for delivery.  Pt will call [facility].

## 2024-02-01 ENCOUNTER — TELEPHONE (OUTPATIENT)
Dept: HEMATOLOGY/ONCOLOGY | Facility: HOSPITAL | Age: 79
End: 2024-02-01

## 2024-02-01 DIAGNOSIS — C61 RECURRENT PROSTATE CANCER (HCC): ICD-10-CM

## 2024-02-01 NOTE — TELEPHONE ENCOUNTER
Patient is calling and he stated that he have a new speciality pharmacy which [facility] and the fax # is [phone] and they need to know about his Xtandi before they can tell him if they can fill any of his prescriptions.

## 2024-02-06 ENCOUNTER — TELEPHONE (OUTPATIENT)
Dept: HEMATOLOGY/ONCOLOGY | Facility: HOSPITAL | Age: 79
End: 2024-02-06

## 2024-02-06 NOTE — TELEPHONE ENCOUNTER
He is working on getting a emy.   He has enough xtandi for 4 days.  He would like to know if he can take less a day to stretch out the amount of xtandi he has left.   May send a [email or web address] chart message with the answer.

## 2024-02-06 NOTE — TELEPHONE ENCOUNTER
Patient is calling to ask about the number of Xtandi pills he has. He is asking if he can reduce his medication?

## 2024-03-15 ENCOUNTER — NURSE ONLY (OUTPATIENT)
Dept: HEMATOLOGY/ONCOLOGY | Facility: HOSPITAL | Age: 79
End: 2024-03-15
Attending: INTERNAL MEDICINE
Payer: MEDICARE

## 2024-03-15 DIAGNOSIS — C61 PROSTATE CANCER (HCC): ICD-10-CM

## 2024-03-15 LAB
ALBUMIN SERPL-MCNC: 3.7 G/DL (ref 3.4–5)
ALBUMIN/GLOB SERPL: 1.1 {RATIO} (ref 1–2)
ALP LIVER SERPL-CCNC: 98 U/L
ALT SERPL-CCNC: 21 U/L
ANION GAP SERPL CALC-SCNC: 4 MMOL/L (ref 0–18)
AST SERPL-CCNC: 12 U/L (ref 15–37)
BASOPHILS # BLD AUTO: 0.04 X10(3) UL (ref 0–0.2)
BASOPHILS NFR BLD AUTO: 0.7 %
BILIRUB SERPL-MCNC: 0.5 MG/DL (ref 0.1–2)
BUN BLD-MCNC: 19 MG/DL (ref 9–23)
CALCIUM BLD-MCNC: 10 MG/DL (ref 8.5–10.1)
CHLORIDE SERPL-SCNC: 107 MMOL/L (ref 98–112)
CO2 SERPL-SCNC: 28 MMOL/L (ref 21–32)
CREAT BLD-MCNC: 0.89 MG/DL
EGFRCR SERPLBLD CKD-EPI 2021: 88 ML/MIN/1.73M2 (ref 60–?)
EOSINOPHIL # BLD AUTO: 0.42 X10(3) UL (ref 0–0.7)
EOSINOPHIL NFR BLD AUTO: 6.9 %
ERYTHROCYTE [DISTWIDTH] IN BLOOD BY AUTOMATED COUNT: 13.9 %
GLOBULIN PLAS-MCNC: 3.5 G/DL (ref 2.8–4.4)
GLUCOSE BLD-MCNC: 114 MG/DL (ref 70–99)
HCT VFR BLD AUTO: 40.1 %
HGB BLD-MCNC: 13.1 G/DL
IMM GRANULOCYTES # BLD AUTO: 0.01 X10(3) UL (ref 0–1)
IMM GRANULOCYTES NFR BLD: 0.2 %
LYMPHOCYTES # BLD AUTO: 1.21 X10(3) UL (ref 1–4)
LYMPHOCYTES NFR BLD AUTO: 19.7 %
MCH RBC QN AUTO: 30.3 PG (ref 26–34)
MCHC RBC AUTO-ENTMCNC: 32.7 G/DL (ref 31–37)
MCV RBC AUTO: 92.8 FL
MONOCYTES # BLD AUTO: 0.62 X10(3) UL (ref 0.1–1)
MONOCYTES NFR BLD AUTO: 10.1 %
NEUTROPHILS # BLD AUTO: 3.83 X10 (3) UL (ref 1.5–7.7)
NEUTROPHILS # BLD AUTO: 3.83 X10(3) UL (ref 1.5–7.7)
NEUTROPHILS NFR BLD AUTO: 62.4 %
OSMOLALITY SERPL CALC.SUM OF ELEC: 291 MOSM/KG (ref 275–295)
PLATELET # BLD AUTO: 202 10(3)UL (ref 150–450)
POTASSIUM SERPL-SCNC: 3.8 MMOL/L (ref 3.5–5.1)
PROT SERPL-MCNC: 7.2 G/DL (ref 6.4–8.2)
PSA SERPL-MCNC: 0.01 NG/ML (ref ?–4)
RBC # BLD AUTO: 4.32 X10(6)UL
SODIUM SERPL-SCNC: 139 MMOL/L (ref 136–145)
WBC # BLD AUTO: 6.1 X10(3) UL (ref 4–11)

## 2024-03-15 PROCEDURE — 85025 COMPLETE CBC W/AUTO DIFF WBC: CPT

## 2024-03-15 PROCEDURE — 36415 COLL VENOUS BLD VENIPUNCTURE: CPT

## 2024-03-15 PROCEDURE — 84153 ASSAY OF PSA TOTAL: CPT

## 2024-03-15 PROCEDURE — 80053 COMPREHEN METABOLIC PANEL: CPT

## 2024-04-30 ENCOUNTER — TELEPHONE (OUTPATIENT)
Dept: HEMATOLOGY/ONCOLOGY | Facility: HOSPITAL | Age: 79
End: 2024-04-30

## 2024-04-30 DIAGNOSIS — C61 RECURRENT PROSTATE CANCER (HCC): Primary | ICD-10-CM

## 2024-04-30 NOTE — TELEPHONE ENCOUNTER
Brenden calling to schedule lab he said he get cmp,cbc and psa recurring. PSA is not in as recurring. Please let him know that order is in okay to leave message. yanick

## 2024-06-07 ENCOUNTER — NURSE ONLY (OUTPATIENT)
Dept: HEMATOLOGY/ONCOLOGY | Facility: HOSPITAL | Age: 79
End: 2024-06-07
Attending: INTERNAL MEDICINE
Payer: MEDICARE

## 2024-06-07 DIAGNOSIS — C61 RECURRENT PROSTATE CANCER (HCC): ICD-10-CM

## 2024-06-07 LAB
ALBUMIN SERPL-MCNC: 4 G/DL (ref 3.4–5)
ALBUMIN/GLOB SERPL: 1.2 {RATIO} (ref 1–2)
ALP LIVER SERPL-CCNC: 96 U/L
ALT SERPL-CCNC: 22 U/L
ANION GAP SERPL CALC-SCNC: 6 MMOL/L (ref 0–18)
AST SERPL-CCNC: 19 U/L (ref 15–37)
BASOPHILS # BLD AUTO: 0.04 X10(3) UL (ref 0–0.2)
BASOPHILS NFR BLD AUTO: 0.8 %
BILIRUB SERPL-MCNC: 0.9 MG/DL (ref 0.1–2)
BUN BLD-MCNC: 15 MG/DL (ref 9–23)
CALCIUM BLD-MCNC: 9.5 MG/DL (ref 8.5–10.1)
CHLORIDE SERPL-SCNC: 104 MMOL/L (ref 98–112)
CO2 SERPL-SCNC: 29 MMOL/L (ref 21–32)
CREAT BLD-MCNC: 0.88 MG/DL
EGFRCR SERPLBLD CKD-EPI 2021: 88 ML/MIN/1.73M2 (ref 60–?)
EOSINOPHIL # BLD AUTO: 0.31 X10(3) UL (ref 0–0.7)
EOSINOPHIL NFR BLD AUTO: 6.3 %
ERYTHROCYTE [DISTWIDTH] IN BLOOD BY AUTOMATED COUNT: 14 %
FASTING STATUS PATIENT QL REPORTED: NO
GLOBULIN PLAS-MCNC: 3.4 G/DL (ref 2.8–4.4)
GLUCOSE BLD-MCNC: 117 MG/DL (ref 70–99)
HCT VFR BLD AUTO: 39.3 %
HGB BLD-MCNC: 13.2 G/DL
IMM GRANULOCYTES # BLD AUTO: 0.01 X10(3) UL (ref 0–1)
IMM GRANULOCYTES NFR BLD: 0.2 %
LYMPHOCYTES # BLD AUTO: 1.03 X10(3) UL (ref 1–4)
LYMPHOCYTES NFR BLD AUTO: 20.8 %
MCH RBC QN AUTO: 30.8 PG (ref 26–34)
MCHC RBC AUTO-ENTMCNC: 33.6 G/DL (ref 31–37)
MCV RBC AUTO: 91.6 FL
MONOCYTES # BLD AUTO: 0.5 X10(3) UL (ref 0.1–1)
MONOCYTES NFR BLD AUTO: 10.1 %
NEUTROPHILS # BLD AUTO: 3.07 X10 (3) UL (ref 1.5–7.7)
NEUTROPHILS # BLD AUTO: 3.07 X10(3) UL (ref 1.5–7.7)
NEUTROPHILS NFR BLD AUTO: 61.8 %
OSMOLALITY SERPL CALC.SUM OF ELEC: 290 MOSM/KG (ref 275–295)
PLATELET # BLD AUTO: 198 10(3)UL (ref 150–450)
POTASSIUM SERPL-SCNC: 4 MMOL/L (ref 3.5–5.1)
PROT SERPL-MCNC: 7.4 G/DL (ref 6.4–8.2)
PSA SERPL-MCNC: 0.01 NG/ML (ref ?–4)
RBC # BLD AUTO: 4.29 X10(6)UL
SODIUM SERPL-SCNC: 139 MMOL/L (ref 136–145)
WBC # BLD AUTO: 5 X10(3) UL (ref 4–11)

## 2024-06-07 PROCEDURE — 80053 COMPREHEN METABOLIC PANEL: CPT

## 2024-06-07 PROCEDURE — 36415 COLL VENOUS BLD VENIPUNCTURE: CPT

## 2024-06-07 PROCEDURE — 84153 ASSAY OF PSA TOTAL: CPT

## 2024-06-07 PROCEDURE — 85025 COMPLETE CBC W/AUTO DIFF WBC: CPT

## 2024-07-18 NOTE — PROGRESS NOTES
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     Following for:  1. 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 Urinary retention following biopsy on 7/18/22  - developed retention + BENNETT s/p biopsy  - failed VT x 3 and on CIC but urination is improving with PFT  - was on flomax, now rapaflo  - Cysto 11/8/22: moderate BPH with moderate FOSTER. Moderate bladder trabeculation with small bladder diverticula noted  - UDS 11/8/22: normal sensation and good detrusor contraction  2. Intermittent gross hematuria with catheterization  3. Presumed overflow incontinence despite CIC  - was > 600 mL out with caths  4. H/o kidney stones  - passed one ~ 1990     PCP - Mock   Onc - Tho  Prior Urologist - [place] (Aug 2022), [place]     LOV 12/13/2023     Presents for check-up.     Lost his wife in 2022.     He is taking rapaflo.  He last saw Dr Goldstein and long-term ADT recommended with CIC with re-eval at f/u. Prior to PFT was performing CIC 6 x per day with 300-600 mL out.  He completed PFT in [place] in Jan 2023, went down to BID CIC 12/28/22 and stopped CIC completely in Feb 2023.  Voids at least 5-6 time during the day. No interim CIC.  Constipation has resolved with miralax and colace BID per PCP.     No flank pain, hematuria, dysuria.    AUA SS is 14/35 with 4 w, u; 3 n, f. Was 13/35  with 4 n, u; 2 w, f; 1 I. Was 18/35 with 5 w, f, ESTHELA; 2 n; 1 i. Mostly happy with LUTS.  Incontinence: occasional UI/dribbles but improving. Using 1 PPD and dry to damp. Not doing PFT exercises right now but not bothered. May go back to PFT in [place] later.  Penoscrotal LOV: no abnormalities  DESEAN LOV: ~ 40 g, somewhat firm, non-tender    UA is negative. Has shown large blood in the past.  PVR is 91 mL was 52, 22 mL LOV and 226 mL prior. Was 226, 322 mL prior checks     Gross hematuria: yes with catheterizations  Tobacco hx: 35 pack years, quit 2000   Fam h/o  malignancy: none     Hasn't had an erection in years and wanted to discuss today but does not want to have erections at this time and does not want to try any meds right now.     Drinks ~ 2.5 L water, rare coffee, soda with medium yellow urine.     Prior PSAs:  - 0.01 6/7/24  - <0.01 3/15/24  - < 0.01 9/18/23  - 0.01 4/24/23  - 0.06 12/19/22  - 0.30 10/17/22  - 6.72 9/19/22  - 5.38 9/6/22  - 07/18/2022 Uronav Bx: Oracio 5+4, 4+5  - 04/22/2022 MRI prostate: 37.4 cc, PI-RADS 4 at left posterior periph zone  - 1.34 4/14/2022   - 11/18/2021 PSMA PET-CT: uptake in prostate  - 1.07 11/01/21  - 0.43 4/21   - 0.19 10/20   - 0.09 5/20   - 0.07 10/19  - 0.04 7/19  - 0.02 4/19  - 0.01 1/19  - 0.03 10/18  - 07/02/2018 - 08/28/2018 salvage EBRT to pelvic nodes (prostate not re-treated)  - 04/11/2018 PSA 4.5 Lupron 45 mg  - 03/06/2018 MRI prostate: (-)  - 01/2018 Auxumin PET-CT: (+) left ext iliac nodes  - 5.01 1/18/18  - 3.99 10/17  - 3.01 7/17  - 1.80 1/17  - 1.00 7/16  - 07/09/2015, 07/23/2015 HDR brachytherapy, 27 Gy in 2 fractions  - PSA 5.0 2015     CTU 6/2/23: no hydro, simple b/l renal cysts, small liver cysts, diverticulosis, b/l adrenal adenomas     CTU 11/14/22: Report states left hydro with possible narrowing in proximal to mid ureter. I see good caliber ureter down to level of bladder so this could be reflux.      UDS 11/8/22: first sensation 65 mL, first  desire 111 mL, strong desire 251 mL, max bladder capacity of 310 mL. No leakage with valsalva. The patient was not able to void with max detrusor pressure of 45 cm H2O and 400 mL PVR.     He would prefer to continue rapaflo, stopped CIC, continue PFT exercises. Continue to drink > 40-60 oz water per day for UTI prevention. Continue ADT with Xtandi per Dr Nettles.  F/u with me in 6 mo with PVR. Consider TURP later if he retains again but doing great now following PFT.     Prior note:  We discussed that sensation appears normal and he has good detrusor contractions. He may benefit from palliative TURP as this may decrease or eliminate need for CIC. He already has pelvic floor weakness with incontinence so I would encourage him to complete PFT first. Discussed that incontinence may worsen following TURP.     Prior note:  We discussed options for urinary symptoms. Ideally he should be cathing with values < 500 mL so he will increase frequency of CIC.      We discussed Kegel exercises for incontinence. I provided and reviewed educational materials for this. We also discussed PFT for both bladder and bowel dysfunction and he is interested in doing this.     PSA seems to have responded favorably to ADT/abiraterone and he will continue this with Dr Nettles.     For retention and gross hematuria I would recommend CTU, UDS, office cysto. Discussed rationale for these tests and he is agreeable.    HISTORY:  Past Medical History:    Allergic rhinitis    Hay Fever    Cancer (HCC)    Prostate Cancer HDBracytherapy    Essential hypertension    Hyperlipidemia    Under Medical Control    Sleep apnea    Use CPAP      Past Surgical History:   Procedure Laterality Date    Colonoscopy  Since 1995    As needed    Hernia surgery  2007    Umbilical repair    Other      R knee scope    Other surgical history      basal cell carcinoma 9/15/2017 & 6/20/2016    Skin surgery  2017    Basal Cell - MOHS    Tonsillectomy      Vasectomy  1990       Family History   Problem Relation Age of Onset    Cancer Father         Lung Cancer    Diabetes Father     Cancer Paternal Grandfather         Cancer    Cancer Sister         Breast then Ovarian Cancer    Stroke Maternal Grandmother         Survived and live 20 more yrs      Social History:   Social History     Socioeconomic History    Marital status:    Tobacco Use    Smoking status: Former     Current packs/day: 0.00     Average packs/day: 1 pack/day for 35.0 years (35.0 ttl pk-yrs)     Types: Cigarettes, Pipe, Cigars     Start date: 1965     Quit date: 2000     Years since quittin.5    Smokeless tobacco: Never    Tobacco comments:     20yrs cigarettes, 9 yrs pipe, 6 years cigars [place] golf [place]   Vaping Use    Vaping status: Never Used   Substance and Sexual Activity    Alcohol use: Yes     Alcohol/week: 3.0 standard drinks of alcohol     Types: 3 Standard drinks or equivalent per week     Comment: a Couple of Beers with Mx, Dejuan and Pizza    Drug use: No   Other Topics Concern    Caffeine Concern No    Exercise No    Seat Belt No    Special Diet No    Stress Concern No    Weight Concern No   Social History Narrative    Occupation: retired former pathologist/ genetist.     Where Employed: [employer] and [employer].     Job Duration (yrs): years 30    Education: phd in science    Hobbies/ Interests: likes golf and runs a golf    Pets: none, grand-dog: bulldog.    Lives with: Erika, son in law, and grandson lives with him.     Lives at: home    Marital Status: , ab  in     Years : 55    Number of Children: 2 children, 3 grandchild    Spiritism/ Christianity: Denominational Shinto.     Other Social History Comments: none         DPOAHC: Daughter, Ashley and then kassy :         no long term ventilators.         Quality, better than quantity.  Full code:  No long term feeding tubes.      Social Determinants of Health     Financial Resource Strain: Patient Declined  (1/18/2024)    [facility]    Overall Financial Resource Strain (CARDIA)     Difficulty of Paying Living Expenses: Patient declined   Food Insecurity: Patient Declined (1/18/2024)    [facility]    Hunger Vital Sign     Worried About Running Out of Food in the Last Year: Patient declined     Ran Out of Food in the Last Year: Patient declined   Transportation Needs: Patient Declined (1/18/2024)    [facility]    PRAPARE - Transportation     Lack of Transportation (Medical): Patient declined     Lack of Transportation (Non-Medical): Patient declined   Physical Activity: Sufficiently Active (1/18/2024)    [facility]    Exercise Vital Sign     Days of Exercise per Week: 4 days     Minutes of Exercise per Session: 40 min   Stress: Patient Declined (1/18/2024)    [facility]    [employer] - Occupational Stress Questionnaire     Feeling of Stress : Patient declined   Social Connections: Patient Declined (1/18/2024)    [facility]    Social Connection and Isolation Panel [NHANES]     Frequency of Communication with Friends and Family: Patient declined     Frequency of Social Gatherings with Friends and Family: Patient declined     Attends Shinto Services: Patient declined     Active Member of Clubs or Organizations: Patient declined     Attends Club or Organization Meetings: Patient declined     Marital Status: Patient declined    Housing Stability: Unknown (1/18/2024)    [facility]    Housing Stability Vital Sign     Unable to Pay for Housing in the Last Year: Patient declined     Number of Places Lived in the Last Year: 1     Unstable Housing in the Last Year: No        Medications (Active prior to today's visit):  Current Outpatient Medications   Medication Sig Dispense Refill    silodosin 8 MG Oral Cap Take 1 capsule (8 mg total) by mouth daily. 90 capsule 5    Enzalutamide 40 MG Oral Tab Take 160 mg by mouth daily. 120 tablet 11    Glucose Blood (ONETOUCH VERIO) In Vitro Strip 1 Lancet by Finger stick route 2 (two) times daily.      allopurinol 300 MG Oral Tab Take 1 tablet (300 mg total) by mouth daily. 90 tablet 0    atorvastatin 20 MG Oral Tab Take 1 tablet (20 mg total) by mouth every evening. 90 tablet 0    cloNIDine 0.1 MG Oral Tab Take 1 tablet (0.1 mg total) by mouth 2 (two) times daily. 270 tablet 0    clobetasol 0.05 % External Solution Apply 1 mL topically 2 (two) times daily. (Patient taking differently: Apply 1 mL topically as needed.) 50 mL 0    cyanocobalamin 1000 MCG Oral Tab Take 1 tablet (1,000 mcg total) by mouth daily.      docusate sodium 100 MG Oral Cap Take 1 capsule (100 mg total) by mouth 2 (two) times daily as needed.      Polyethylene Glycol 3350 17 GM/SCOOP Oral Powder Take 17 g by mouth daily as needed.      Vitamin D3 2000 units Oral Cap Take 3 capsules (6,000 Units total) by mouth daily. 2000u three times a day      Coenzyme Q10 (CO Q 10) 100 MG Oral Cap Take 1 capsule by mouth in the morning and 1 capsule before bedtime.      Omega-3 Fatty Acids (OMEGA-3 FISH OIL) 300 MG Oral Cap Take 1 capsule by mouth 2 (two) times daily.         Allergies:  Allergies   Allergen Reactions    Adhesive Tape OTHER (SEE COMMENTS)     Other reaction(s): Blisters  Other reaction(s): Blisters    Aloysia Triphylla OTHER (SEE  COMMENTS) and SWELLING     Lemon-verbena    Lemon-grass from tea patient gets mouth tingling/numbing    Cymbopogon TONGUE SWELLING, ANAPHYLAXIS and OTHER (SEE COMMENTS)    Mastisol Adhesive OTHER (SEE COMMENTS) and UNKNOWN     blisters    Terazosin OTHER (SEE COMMENTS) and SWELLING    Terconazole SWELLING     Nasal congestion, dizziness, edema    Terazosin Hcl      Other reaction(s): nasal congestion, dizzines,  edema    Other OTHER (SEE COMMENTS)     blister    Latex OTHER (SEE COMMENTS)     blister    Sulfa Antibiotics ITCHING and UNKNOWN     As a child         ROS:     A comprehensive 10 point review of systems was completed.  Pertinent positives and negatives noted in the the HPI.    PHYSICAL EXAM:     GENERAL APPEARANCE: well, developed, well nourished, in no acute distress  NEUROLOGIC: nonfocal, alert and oriented  HEAD: normocephalic, atraumatic  EYES: sclera non-icteric  EARS: hearing intact  ORAL CAVITY: mucosa moist  NECK/THYROID: no obvious goiter or masses  LUNGS: nonlabored breathing  ABDOMEN: soft, no obvious masses or tenderness  SKIN: no obvious rashes    : as noted above    ASSESSMENT/PLAN:   Diagnoses and all orders for this visit:    Urinary retention  -     URINALYSIS, AUTO, W/O SCOPE  -     silodosin 8 MG Oral Cap; Take 1 capsule (8 mg total) by mouth daily.    Overflow incontinence    Prostate cancer (HCC)    Hydronephrosis of left kidney    Gross hematuria    Weak urinary stream    - as noted above.    Thanks again for this consult.    Clement Minaya MD, FACS  Urologist  [facility]  Office: [phone]

## 2024-07-29 ENCOUNTER — OFFICE VISIT (OUTPATIENT)
Dept: SURGERY | Facility: CLINIC | Age: 79
End: 2024-07-29
Payer: MEDICARE

## 2024-07-29 DIAGNOSIS — R31.0 GROSS HEMATURIA: ICD-10-CM

## 2024-07-29 DIAGNOSIS — R39.12 WEAK URINARY STREAM: ICD-10-CM

## 2024-07-29 DIAGNOSIS — N39.490 OVERFLOW INCONTINENCE: ICD-10-CM

## 2024-07-29 DIAGNOSIS — N13.30 HYDRONEPHROSIS OF LEFT KIDNEY: ICD-10-CM

## 2024-07-29 DIAGNOSIS — C61 PROSTATE CANCER (HCC): ICD-10-CM

## 2024-07-29 DIAGNOSIS — R33.9 URINARY RETENTION: Primary | ICD-10-CM

## 2024-07-29 LAB
APPEARANCE: CLEAR
BILIRUBIN: NEGATIVE
GLUCOSE (URINE DIPSTICK): NEGATIVE MG/DL
KETONES (URINE DIPSTICK): NEGATIVE MG/DL
LEUKOCYTES: NEGATIVE
MULTISTIX LOT#: NORMAL NUMERIC
NITRITE, URINE: NEGATIVE
OCCULT BLOOD: NEGATIVE
PH, URINE: 5.5 (ref 4.5–8)
PROTEIN (URINE DIPSTICK): NEGATIVE MG/DL
SPECIFIC GRAVITY: 1.01 (ref 1–1.03)
URINE-COLOR: YELLOW
UROBILINOGEN,SEMI-QN: 0.2 MG/DL (ref 0–1.9)

## 2024-07-29 PROCEDURE — 51798 US URINE CAPACITY MEASURE: CPT | Performed by: UROLOGY

## 2024-07-29 PROCEDURE — 81003 URINALYSIS AUTO W/O SCOPE: CPT | Performed by: UROLOGY

## 2024-07-29 PROCEDURE — 99214 OFFICE O/P EST MOD 30 MIN: CPT | Performed by: UROLOGY

## 2024-07-29 RX ORDER — SILODOSIN 8 MG/1
8 CAPSULE ORAL DAILY
Qty: 90 CAPSULE | Refills: 5 | Status: SHIPPED | OUTPATIENT
Start: 2024-07-29

## 2024-09-13 ENCOUNTER — NURSE ONLY (OUTPATIENT)
Dept: HEMATOLOGY/ONCOLOGY | Facility: HOSPITAL | Age: 79
End: 2024-09-13
Attending: INTERNAL MEDICINE
Payer: MEDICARE

## 2024-09-13 DIAGNOSIS — C61 RECURRENT PROSTATE CANCER (HCC): ICD-10-CM

## 2024-09-13 LAB
BASOPHILS # BLD AUTO: 0.03 X10(3) UL (ref 0–0.2)
BASOPHILS NFR BLD AUTO: 0.5 %
EOSINOPHIL # BLD AUTO: 0.16 X10(3) UL (ref 0–0.7)
EOSINOPHIL NFR BLD AUTO: 2.6 %
ERYTHROCYTE [DISTWIDTH] IN BLOOD BY AUTOMATED COUNT: 14.3 %
HCT VFR BLD AUTO: 39.4 %
HGB BLD-MCNC: 13.2 G/DL
IMM GRANULOCYTES # BLD AUTO: 0.05 X10(3) UL (ref 0–1)
IMM GRANULOCYTES NFR BLD: 0.8 %
LYMPHOCYTES # BLD AUTO: 0.91 X10(3) UL (ref 1–4)
LYMPHOCYTES NFR BLD AUTO: 15 %
MCH RBC QN AUTO: 30.7 PG (ref 26–34)
MCHC RBC AUTO-ENTMCNC: 33.5 G/DL (ref 31–37)
MCV RBC AUTO: 91.6 FL
MONOCYTES # BLD AUTO: 0.63 X10(3) UL (ref 0.1–1)
MONOCYTES NFR BLD AUTO: 10.4 %
NEUTROPHILS # BLD AUTO: 4.28 X10 (3) UL (ref 1.5–7.7)
NEUTROPHILS # BLD AUTO: 4.28 X10(3) UL (ref 1.5–7.7)
NEUTROPHILS NFR BLD AUTO: 70.7 %
PLATELET # BLD AUTO: 260 10(3)UL (ref 150–450)
PSA SERPL-MCNC: <0.04 NG/ML (ref ?–4)
RBC # BLD AUTO: 4.3 X10(6)UL
WBC # BLD AUTO: 6.1 X10(3) UL (ref 4–11)

## 2024-09-13 PROCEDURE — 84153 ASSAY OF PSA TOTAL: CPT

## 2024-09-13 PROCEDURE — 85025 COMPLETE CBC W/AUTO DIFF WBC: CPT

## 2024-09-13 PROCEDURE — 36415 COLL VENOUS BLD VENIPUNCTURE: CPT

## 2024-11-25 ENCOUNTER — TELEPHONE (OUTPATIENT)
Dept: HEMATOLOGY/ONCOLOGY | Facility: HOSPITAL | Age: 79
End: 2024-11-25

## 2024-11-25 NOTE — TELEPHONE ENCOUNTER
Pt called and stated he's currently taking Xtandi and he would like to know if he can have 5-fluoro uracil placed on his face everyday for 2wks on and 2wks off repeating this process until the derm Dr. Alisa Hou thinks he had enough    Pt states the Xtandi is working for him and it was a little issue after two years but he wants to make sure the Xtandi and Fluoro uracil doesn't have a reaction    Please give the pt a call back

## 2024-12-13 ENCOUNTER — NURSE ONLY (OUTPATIENT)
Dept: HEMATOLOGY/ONCOLOGY | Facility: HOSPITAL | Age: 79
End: 2024-12-13
Attending: INTERNAL MEDICINE
Payer: MEDICARE

## 2024-12-13 DIAGNOSIS — C61 RECURRENT PROSTATE CANCER (HCC): ICD-10-CM

## 2024-12-13 LAB
ALBUMIN SERPL-MCNC: 4.4 G/DL (ref 3.2–4.8)
ALBUMIN/GLOB SERPL: 1.4 {RATIO} (ref 1–2)
ALP LIVER SERPL-CCNC: 90 U/L
ALT SERPL-CCNC: 20 U/L
ANION GAP SERPL CALC-SCNC: 7 MMOL/L (ref 0–18)
AST SERPL-CCNC: 11 U/L (ref ?–34)
BASOPHILS # BLD AUTO: 0.05 X10(3) UL (ref 0–0.2)
BASOPHILS NFR BLD AUTO: 0.8 %
BILIRUB SERPL-MCNC: 0.7 MG/DL (ref 0.2–1.1)
BUN BLD-MCNC: 11 MG/DL (ref 9–23)
CALCIUM BLD-MCNC: 10.1 MG/DL (ref 8.7–10.4)
CHLORIDE SERPL-SCNC: 104 MMOL/L (ref 98–112)
CO2 SERPL-SCNC: 30 MMOL/L (ref 21–32)
CREAT BLD-MCNC: 0.88 MG/DL
EGFRCR SERPLBLD CKD-EPI 2021: 87 ML/MIN/1.73M2 (ref 60–?)
EOSINOPHIL # BLD AUTO: 0.45 X10(3) UL (ref 0–0.7)
EOSINOPHIL NFR BLD AUTO: 7.6 %
ERYTHROCYTE [DISTWIDTH] IN BLOOD BY AUTOMATED COUNT: 14.3 %
GLOBULIN PLAS-MCNC: 3.2 G/DL (ref 2–3.5)
GLUCOSE BLD-MCNC: 116 MG/DL (ref 70–99)
HCT VFR BLD AUTO: 40.5 %
HGB BLD-MCNC: 13.5 G/DL
IMM GRANULOCYTES # BLD AUTO: 0.01 X10(3) UL (ref 0–1)
IMM GRANULOCYTES NFR BLD: 0.2 %
LYMPHOCYTES # BLD AUTO: 0.92 X10(3) UL (ref 1–4)
LYMPHOCYTES NFR BLD AUTO: 15.6 %
MCH RBC QN AUTO: 30.8 PG (ref 26–34)
MCHC RBC AUTO-ENTMCNC: 33.3 G/DL (ref 31–37)
MCV RBC AUTO: 92.5 FL
MONOCYTES # BLD AUTO: 0.47 X10(3) UL (ref 0.1–1)
MONOCYTES NFR BLD AUTO: 8 %
NEUTROPHILS # BLD AUTO: 4 X10 (3) UL (ref 1.5–7.7)
NEUTROPHILS # BLD AUTO: 4 X10(3) UL (ref 1.5–7.7)
NEUTROPHILS NFR BLD AUTO: 67.8 %
OSMOLALITY SERPL CALC.SUM OF ELEC: 292 MOSM/KG (ref 275–295)
PLATELET # BLD AUTO: 199 10(3)UL (ref 150–450)
POTASSIUM SERPL-SCNC: 4 MMOL/L (ref 3.5–5.1)
PROT SERPL-MCNC: 7.6 G/DL (ref 5.7–8.2)
PSA SERPL-MCNC: 0.05 NG/ML (ref ?–4)
RBC # BLD AUTO: 4.38 X10(6)UL
SODIUM SERPL-SCNC: 141 MMOL/L (ref 136–145)
WBC # BLD AUTO: 5.9 X10(3) UL (ref 4–11)

## 2024-12-13 PROCEDURE — 84153 ASSAY OF PSA TOTAL: CPT

## 2024-12-13 PROCEDURE — 36415 COLL VENOUS BLD VENIPUNCTURE: CPT

## 2024-12-13 PROCEDURE — 85025 COMPLETE CBC W/AUTO DIFF WBC: CPT

## 2024-12-13 PROCEDURE — 80053 COMPREHEN METABOLIC PANEL: CPT

## 2024-12-24 DIAGNOSIS — C61 RECURRENT PROSTATE CANCER (HCC): ICD-10-CM

## 2025-01-03 NOTE — PROGRESS NOTES
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     Following for:  1. 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 Urinary retention following biopsy on 7/18/22  - developed retention + BENNETT s/p biopsy  - failed VT x 3 and on CIC but urination is improving with PFT  - was on flomax, now rapaflo  - Cysto 11/8/22: moderate BPH with moderate FOSTER. Moderate bladder trabeculation with small bladder diverticula noted  - UDS 11/8/22: normal sensation and good detrusor contraction  2. Intermittent gross hematuria with catheterization  3. Presumed overflow incontinence despite CIC  - was > 600 mL out with caths  4. H/o kidney stones  - passed one ~ 1990     PCP - Mock   Onc - Tho  Prior Urologist - [place] (Aug 2022), [place]     LOV 7/29/2024     Presents for check-up and discuss R inguinal rash. First noted last summer and using econazole cream per derm which doesn't help.     Lost his wife in 2022.     He is taking rapaflo.  He feels well. Appetite and energy are good.  Hot flashes: none    He last saw Dr Goldstein and long-term ADT recommended with CIC with re-eval at f/u. Prior to PFT was performing CIC 6 x per day with 300-600 mL out.  He completed PFT in [place] in Jan 2023, went down to BID CIC 12/28/22 and stopped CIC completely in Feb 2023.  Voids at least 5-6 time  during the day. No interim CIC.  Constipation resolved with miralax and colace BID per PCP.     No flank pain, hematuria, dysuria.    AUA SS is 14/35 with 4 w, u; 3 n, f. Was 13/35 with 4 n, u; 2 w, f; 1 I. Was 18/35 with 5 w, f, ESTHELA; 2 n; 1 i. Mostly happy with LUTS.  Incontinence: occasional UI/dribbles but improving. Using 1 PPD and dry to damp. Not doing PFT exercises right now but not bothered. May go back to PFT in [place] later.  Penoscrotal: beefy red rash in right inguinal fold  DESEAN LOV: ~ 40 g, somewhat firm, non-tender    UA is negative. Has shown large blood in the past.  PVRs range 22-91 mL post-PFT. Was ~ 300 mL prior to PFT.     Gross hematuria: yes with catheterizations  Tobacco hx: 35 pack years, quit 2000   Fam h/o  malignancy: none     Hasn't had an erection in years and wanted to discuss today but does not want to have erections at this time and does not want to try any meds right now.     Drinks ~ 2.5 L water, rare coffee, soda with medium yellow urine.     Prior PSAs:  - 0.05 12/13/24  - 0.01 6/7/24  - <0.01 3/15/24  - < 0.01 9/18/23  - 0.01 4/24/23  - 0.06 12/19/22  - 0.30 10/17/22  - 6.72 9/19/22  - 5.38 9/6/22  - 07/18/2022 Uronav Bx: [place] 5+4, 4+5  - 04/22/2022 MRI prostate: 37.4 cc, PI-RADS 4 at left posterior periph zone  - 1.34 4/14/2022   - 11/18/2021 PSMA PET-CT: uptake in prostate  - 1.07 11/01/21  - 0.43 4/21   - 0.19 10/20   - 0.09 5/20   - 0.07 10/19  - 0.04 7/19  - 0.02 4/19  - 0.01 1/19  - 0.03 10/18  - 07/02/2018 - 08/28/2018 salvage EBRT to pelvic nodes (prostate not re-treated)  - 04/11/2018 PSA 4.5 Lupron 45 mg  - 03/06/2018 MRI prostate: (-)  - 01/2018 Auxumin PET-CT: (+) left ext iliac nodes  - 5.01 1/18/18  - 3.99 10/17  - 3.01 7/17  - 1.80 1/17  - 1.00 7/16  - 07/09/2015, 07/23/2015 HDR brachytherapy, 27 Gy in 2 fractions  - PSA 5.0 2015     CTU 6/2/23: no hydro, simple b/l renal cysts, small liver cysts, diverticulosis, b/l adrenal adenomas     CTU 11/14/22: Report  states left hydro with possible narrowing in proximal to mid ureter. I see good caliber ureter down to level of bladder so this could be reflux.      UDS 11/8/22: first sensation 65 mL, first desire 111 mL, strong desire 251 mL, max bladder capacity of 310 mL. No leakage with valsalva. The patient was not able to void with max detrusor pressure of 45 cm H2O and 400 mL PVR.     He would prefer to continue rapaflo, stopped CIC, continue PFT exercises. Continue to drink > 40-60 oz water per day for UTI prevention. Continue ADT with Xtandi per Dr Nettles. Lotrisone Rx sent for yeast rash. F/u with me in 6 mo with PVR. Consider TURP later if he retains again but doing great now following PFT.     Prior note:  We discussed that sensation appears normal and he has good detrusor contractions. He may benefit from palliative TURP as this may decrease or eliminate need for CIC. He already has pelvic floor weakness with incontinence so I would encourage him to complete PFT first. Discussed that incontinence may worsen following TURP.     Prior note:  We discussed options for urinary symptoms. Ideally he should be cathing with values < 500 mL so he will increase frequency of CIC.      We discussed Kegel exercises for incontinence. I provided and reviewed educational materials for this. We also discussed PFT for both bladder and bowel dysfunction and he is interested in doing this.     PSA seems to have responded favorably to ADT/abiraterone and he will continue this with Dr Nettles.     For retention and gross hematuria I would recommend CTU, UDS, office cysto. Discussed rationale for these tests and he is agreeable.    HISTORY:  Past Medical History:    Allergic rhinitis    Hay Fever    Cancer (HCC)    Prostate Cancer HDBracytherapy    Essential hypertension    Hyperlipidemia    Under Medical Control    Sleep apnea    Use CPAP      Past Surgical History:   Procedure Laterality Date    Colonoscopy  Since 1995    As needed     Hernia surgery  2007    Umbilical repair    Other      R knee scope    Other surgical history      basal cell carcinoma 9/15/2017 & 2016    Skin surgery  2017    Basal Cell - MOHS    Tonsillectomy      Vasectomy        Family History   Problem Relation Age of Onset    Cancer Father         Lung Cancer    Diabetes Father     Cancer Paternal Grandfather         Cancer    Cancer Sister         Breast then Ovarian Cancer    Stroke Maternal Grandmother         Survived and live 20 more yrs      Social History:   Social History     Socioeconomic History    Marital status:    Tobacco Use    Smoking status: Former     Current packs/day: 0.00     Average packs/day: 1 pack/day for 35.0 years (35.0 ttl pk-yrs)     Types: Cigarettes, Pipe, Cigars     Start date: 1965     Quit date: 2000     Years since quittin.0    Smokeless tobacco: Never    Tobacco comments:     20yrs cigarettes, 9 yrs pipe, 6 years cigars [place] golf [place]   Vaping Use    Vaping status: Never Used   Substance and Sexual Activity    Alcohol use: Yes     Alcohol/week: 3.0 standard drinks of alcohol     Types: 3 Standard drinks or equivalent per week     Comment: a Couple of Beers with Mx, Lebanese and Pizza    Drug use: No   Other Topics Concern    Caffeine Concern No    Exercise No    Seat Belt No    Special Diet No    Stress Concern No    Weight Concern No   Social History Narrative    Occupation: retired former pathologist/ genetist.     Where Employed: [employer] and [employer].     Job Duration (yrs): years 30    Education: phd in science    Hobbies/ Interests: likes golf and runs a golf    Pets: none, grand-dog: bulldog.    Lives with: Erika, son in law, and grandson lives with him.     Lives at: home    Marital Status: , ab  in     Years : 55    Number of Children: 2 children, 3 grandchild    [employer]/ Qu[employer]: [employer].     Other Social History Comments: none         DPOAHC:  Daughter, Ashley and then kassy :         no long term ventilators.         Quality, better than quantity.  Full code:  No long term feeding tubes.      Social Drivers of Health     Financial Resource Strain: Patient Declined (1/18/2024)    [facility]    Overall Financial Resource Strain (CARDIA)     Difficulty of Paying Living Expenses: Patient declined   Food Insecurity: Patient Declined (1/18/2024)    [facility]    Hunger Vital Sign     Worried About Running Out of Food in the Last Year: Patient declined     Ran Out of Food in the Last Year: Patient declined   Transportation Needs: Patient Declined (1/18/2024)    [facility]    PRAPARE - Transportation     Lack of Transportation (Medical): Patient declined     Lack of Transportation (Non-Medical): Patient declined   Physical Activity: Sufficiently Active (1/18/2024)    [facility]    Exercise Vital Sign     Days of Exercise per Week: 4 days     Minutes of Exercise per Session: 40 min   Stress: Patient Declined (1/18/2024)    [facility]    [place] [employer] - Occupational Stress Questionnaire     Feeling of Stress : Patient declined   Social Connections: Patient Declined (1/18/2024)    [facility]    Social Connection and Isolation Panel [NHANES]     Frequency of Communication with Friends and Family: Patient declined     Frequency of Social Gatherings with Friends and Family:  Patient declined     Attends Yazidism Services: Patient declined     Active Member of Clubs or Organizations: Patient declined     Attends Club or Organization Meetings: Patient declined     Marital Status: Patient declined   Housing Stability: Unknown (1/18/2024)    [facility]    Housing Stability Vital Sign     Unable to Pay for Housing in the Last Year: Patient declined     Number of Places Lived in the Last Year: 1     Unstable Housing in the Last Year: No        Medications (Active prior to today's visit):  Current Outpatient Medications   Medication Sig Dispense Refill    Econazole Nitrate 1 % External Cream Apply 1 Application topically daily.      XTANDI 40 MG Oral Tab TAKE 4 TABLETS (160 MG) BY MOUTH DAILY. 120 tablet 11    silodosin 8 MG Oral Cap Take 1 capsule (8 mg total) by mouth daily. 90 capsule 5    allopurinol 300 MG Oral Tab Take 1 tablet (300 mg total) by mouth daily. 90 tablet 0    atorvastatin 20 MG Oral Tab Take 1 tablet (20 mg total) by mouth every evening. 90 tablet 0    cloNIDine 0.1 MG Oral Tab Take 1 tablet (0.1 mg total) by mouth 2 (two) times daily. 270 tablet 0    cyanocobalamin 1000 MCG Oral Tab Take 1 tablet (1,000 mcg total) by mouth daily.      docusate sodium 100 MG Oral Cap Take 1 capsule (100 mg total) by mouth 2 (two) times daily as needed.      Polyethylene Glycol 3350 17 GM/SCOOP Oral Powder Take 17 g by mouth daily as needed.      Vitamin D3 2000 units Oral Cap Take 3 capsules (6,000 Units total) by mouth daily. 2000u three times a day      Coenzyme Q10 (CO Q 10) 100 MG Oral Cap Take 1 capsule by mouth in the morning and 1 capsule before bedtime.      Omega-3 Fatty Acids (OMEGA-3 FISH OIL) 300 MG Oral Cap Take 1 capsule by mouth 2 (two) times daily.         Allergies:  Allergies   Allergen Reactions    Adhesive Tape OTHER (SEE COMMENTS)     Other reaction(s): Blisters  Other  reaction(s): Blisters    Aloysia Triphylla OTHER (SEE COMMENTS) and SWELLING     Lemon-verbena    Lemon-grass from tea patient gets mouth tingling/numbing    Cymbopogon TONGUE SWELLING, ANAPHYLAXIS and OTHER (SEE COMMENTS)    Mastisol Adhesive OTHER (SEE COMMENTS) and UNKNOWN     blisters    Terazosin OTHER (SEE COMMENTS) and SWELLING    Terconazole SWELLING     Nasal congestion, dizziness, edema    Terazosin Hcl      Other reaction(s): nasal congestion, dizzines,  edema    Other OTHER (SEE COMMENTS)     blister    Latex OTHER (SEE COMMENTS)     blister    Sulfa Antibiotics ITCHING and UNKNOWN     As a child         ROS:     A comprehensive 10 point review of systems was completed.  Pertinent positives and negatives noted in the the HPI.    PHYSICAL EXAM:     GENERAL APPEARANCE: well, developed, well nourished, in no acute distress  NEUROLOGIC: nonfocal, alert and oriented  HEAD: normocephalic, atraumatic  EYES: sclera non-icteric  EARS: hearing intact  ORAL CAVITY: mucosa moist  NECK/THYROID: no obvious goiter or masses  LUNGS: nonlabored breathing  ABDOMEN: soft, no obvious masses or tenderness  SKIN: no obvious rashes    : as noted above    ASSESSMENT/PLAN:   Diagnoses and all orders for this visit:    Urinary retention  -     URINALYSIS, AUTO, W/O SCOPE    Overflow incontinence  -     URINALYSIS, AUTO, W/O SCOPE    Prostate cancer (HCC)  -     URINALYSIS, AUTO, W/O SCOPE    Hydronephrosis of left kidney  -     URINALYSIS, AUTO, W/O SCOPE    Gross hematuria  -     URINALYSIS, AUTO, W/O SCOPE    Weak urinary stream  -     URINALYSIS, AUTO, W/O SCOPE    - as noted above.    Thanks again for this consult.    Clement Minaya MD, FACS  Urologist  [facility]  Office: [phone]

## 2025-01-07 DIAGNOSIS — C61 RECURRENT PROSTATE CANCER (HCC): ICD-10-CM

## 2025-01-07 RX ORDER — ENZALUTAMIDE 40 MG/1
TABLET ORAL
Qty: 120 TABLET | Refills: 11 | Status: SHIPPED | OUTPATIENT
Start: 2025-01-07

## 2025-01-13 ENCOUNTER — OFFICE VISIT (OUTPATIENT)
Dept: SURGERY | Facility: CLINIC | Age: 80
End: 2025-01-13

## 2025-01-13 ENCOUNTER — TELEPHONE (OUTPATIENT)
Dept: SURGERY | Facility: CLINIC | Age: 80
End: 2025-01-13

## 2025-01-13 DIAGNOSIS — R21 SKIN RASH: ICD-10-CM

## 2025-01-13 DIAGNOSIS — C61 PROSTATE CANCER (HCC): ICD-10-CM

## 2025-01-13 DIAGNOSIS — R33.9 URINARY RETENTION: Primary | ICD-10-CM

## 2025-01-13 DIAGNOSIS — R31.0 GROSS HEMATURIA: ICD-10-CM

## 2025-01-13 DIAGNOSIS — N13.30 HYDRONEPHROSIS OF LEFT KIDNEY: ICD-10-CM

## 2025-01-13 DIAGNOSIS — N39.490 OVERFLOW INCONTINENCE: ICD-10-CM

## 2025-01-13 DIAGNOSIS — R39.12 WEAK URINARY STREAM: ICD-10-CM

## 2025-01-13 LAB
APPEARANCE: CLEAR
BILIRUBIN: NEGATIVE
GLUCOSE (URINE DIPSTICK): NEGATIVE MG/DL
KETONES (URINE DIPSTICK): NEGATIVE MG/DL
LEUKOCYTES: NEGATIVE
MULTISTIX LOT#: NORMAL NUMERIC
NITRITE, URINE: NEGATIVE
OCCULT BLOOD: NEGATIVE
PH, URINE: 6 (ref 4.5–8)
PROTEIN (URINE DIPSTICK): NEGATIVE MG/DL
SPECIFIC GRAVITY: 1.02 (ref 1–1.03)
URINE-COLOR: YELLOW
UROBILINOGEN,SEMI-QN: 0.2 MG/DL (ref 0–1.9)

## 2025-01-13 PROCEDURE — G2211 COMPLEX E/M VISIT ADD ON: HCPCS | Performed by: UROLOGY

## 2025-01-13 PROCEDURE — 99214 OFFICE O/P EST MOD 30 MIN: CPT | Performed by: UROLOGY

## 2025-01-13 PROCEDURE — 81003 URINALYSIS AUTO W/O SCOPE: CPT | Performed by: UROLOGY

## 2025-01-13 RX ORDER — CLOTRIMAZOLE AND BETAMETHASONE DIPROPIONATE 10; .64 MG/G; MG/G
1 CREAM TOPICAL 2 TIMES DAILY
Qty: 45 G | Refills: 5 | Status: SHIPPED | OUTPATIENT
Start: 2025-01-13

## 2025-01-13 RX ORDER — ECONAZOLE NITRATE 10 MG/G
1 CREAM TOPICAL DAILY
COMMUNITY
Start: 2024-12-10

## 2025-01-20 ENCOUNTER — OFFICE VISIT (OUTPATIENT)
Age: 80
End: 2025-01-20
Attending: INTERNAL MEDICINE
Payer: MEDICARE

## 2025-01-20 VITALS
WEIGHT: 270 LBS | RESPIRATION RATE: 18 BRPM | DIASTOLIC BLOOD PRESSURE: 90 MMHG | TEMPERATURE: 98 F | HEART RATE: 84 BPM | SYSTOLIC BLOOD PRESSURE: 141 MMHG | OXYGEN SATURATION: 97 % | BODY MASS INDEX: 37 KG/M2

## 2025-01-20 DIAGNOSIS — C61 RECURRENT PROSTATE CANCER (HCC): ICD-10-CM

## 2025-01-20 LAB
ALBUMIN SERPL-MCNC: 4.7 G/DL (ref 3.2–4.8)
ALBUMIN/GLOB SERPL: 1.9 {RATIO} (ref 1–2)
ALP LIVER SERPL-CCNC: 99 U/L
ALT SERPL-CCNC: 19 U/L
ANION GAP SERPL CALC-SCNC: 6 MMOL/L (ref 0–18)
AST SERPL-CCNC: 17 U/L (ref ?–34)
BASOPHILS # BLD AUTO: 0.03 X10(3) UL (ref 0–0.2)
BASOPHILS NFR BLD AUTO: 0.4 %
BILIRUB SERPL-MCNC: 0.5 MG/DL (ref 0.2–1.1)
BUN BLD-MCNC: 12 MG/DL (ref 9–23)
CALCIUM BLD-MCNC: 10 MG/DL (ref 8.7–10.6)
CHLORIDE SERPL-SCNC: 104 MMOL/L (ref 98–112)
CO2 SERPL-SCNC: 31 MMOL/L (ref 21–32)
CREAT BLD-MCNC: 0.86 MG/DL
EGFRCR SERPLBLD CKD-EPI 2021: 88 ML/MIN/1.73M2 (ref 60–?)
EOSINOPHIL # BLD AUTO: 0.37 X10(3) UL (ref 0–0.7)
EOSINOPHIL NFR BLD AUTO: 5.3 %
ERYTHROCYTE [DISTWIDTH] IN BLOOD BY AUTOMATED COUNT: 14.3 %
FASTING STATUS PATIENT QL REPORTED: NO
GLOBULIN PLAS-MCNC: 2.5 G/DL (ref 2–3.5)
GLUCOSE BLD-MCNC: 93 MG/DL (ref 70–99)
HCT VFR BLD AUTO: 40.2 %
HGB BLD-MCNC: 13.5 G/DL
IMM GRANULOCYTES # BLD AUTO: 0.02 X10(3) UL (ref 0–1)
IMM GRANULOCYTES NFR BLD: 0.3 %
LYMPHOCYTES # BLD AUTO: 1.06 X10(3) UL (ref 1–4)
LYMPHOCYTES NFR BLD AUTO: 15.1 %
MCH RBC QN AUTO: 31 PG (ref 26–34)
MCHC RBC AUTO-ENTMCNC: 33.6 G/DL (ref 31–37)
MCV RBC AUTO: 92.2 FL
MONOCYTES # BLD AUTO: 0.71 X10(3) UL (ref 0.1–1)
MONOCYTES NFR BLD AUTO: 10.1 %
NEUTROPHILS # BLD AUTO: 4.82 X10 (3) UL (ref 1.5–7.7)
NEUTROPHILS # BLD AUTO: 4.82 X10(3) UL (ref 1.5–7.7)
NEUTROPHILS NFR BLD AUTO: 68.8 %
OSMOLALITY SERPL CALC.SUM OF ELEC: 291 MOSM/KG (ref 275–295)
PLATELET # BLD AUTO: 216 10(3)UL (ref 150–450)
POTASSIUM SERPL-SCNC: 3.8 MMOL/L (ref 3.5–5.1)
PROT SERPL-MCNC: 7.2 G/DL (ref 5.7–8.2)
PSA SERPL-MCNC: 0.04 NG/ML (ref ?–4)
RBC # BLD AUTO: 4.36 X10(6)UL
SODIUM SERPL-SCNC: 141 MMOL/L (ref 136–145)
WBC # BLD AUTO: 7 X10(3) UL (ref 4–11)

## 2025-01-20 NOTE — PROGRESS NOTES
Cancer Center Progress Note    Patient Name: Brenden Damon   YOB: 1945   Medical Record Number: [id number]   CSN: [id number]   Date of visit: 1/20/2025     Chief Complaint/Reason for Visit:  Chief Complaint   Patient presents with    Follow - Up        History of Present Illness: Brenden presents today for follow up of prostate cancer. He is on enzalutamide (Xtandi) 160mg daily. His medical oncologist is Dr. Jose Luis Nettles, additional oncology history below.    Today, patient reports feeling well overall. He denies any apparent side effects from Xtandi. He has not missed any doses. He reports feeling overall in good health. No new pain, no night sweats.     Oncology History:    Patient initially diagnosed with prostate cancer in 2015, reportedly gleasons 3+4=7, low volume disease. He underwent High dose brachytherapy x2 doses completed in July, 2015. He reports that his PSA was found to be rising in 2018, and Auxamin PET revealed disease in his pelvic nodes, but no distant metastases. He reports that the prostate was negative at the time and he underwent salvage EBRT to the pelvic nodes. The prostate was not treated at the time. His PSA dipped to near zero.      When his PSa was found to be rising in late 2021, a PSMA PET was performed (11/18/21) revealing uptake in the prostate, but no evidence of disease elsewhere. MRI confirmed a lesion at the L posterior peripheral zone. He underwent a navigational biopsy. It revealed Gleasons 4+5=9 disease in the R mid gland, R base, and L base.      Unfortunately, that biopsy was complicated by urinary retention requiring an indwelling de paz. Multiple attempts ad alpha blockade and voiding trials have not been successful.     Initial opinions suggested local therapies or prostatectomy, but he sought an opinion at [facility], where Dr Goldstein recommended against such interventions. The high grade status of his disease makes distant micrometastatic disease very likely,  and his previous radiation makes surgical intervention more difficult and more likely to result in complications. Medical oncology evaluation was recommended.    Triptorelin 22.5mg given on 9/19/2022.        Problem List:  Patient Active Problem List   Diagnosis    First degree atrioventricular block    Eczema    Hemorrhoids    History of colonic polyps    Pure hypercholesterolemia    Actinic keratosis    Osteoarthrosis    REM sleep behavior disorder    Obstructive sleep apnea    Vitamin D deficiency    Vitamin B12 deficiency    Idiopathic chronic gout without tophus    Hypertension    Peripheral neuropathy    Hypercholesterolemia    Ascending aortic aneurysm (HCC)    Type 2 diabetes mellitus with hyperglycemia, without long-term current use of insulin (HCC)    Morbid (severe) obesity due to excess calories (HCC)    Recurrent prostate cancer (HCC)        Medical History:  Past Medical History:    Allergic rhinitis    Hay Fever    Cancer (HCC)    Prostate Cancer HDBracytherapy    Essential hypertension    Hyperlipidemia    Under Medical Control    Sleep apnea    Use CPAP       Surgical History:  Past Surgical History:   Procedure Laterality Date    Colonoscopy  Since 1995    As needed    Hernia surgery  2007    Umbilical repair    Other      R knee scope    Other surgical history      basal cell carcinoma 9/15/2017 & 6/20/2016    Skin surgery  2017    Basal Cell - MOHS    Tonsillectomy      Vasectomy  1990       Allergies:  Allergies[1]    Family History:  Family History   Problem Relation Age of Onset    Cancer Father         Lung Cancer    Diabetes Father     Cancer Paternal Grandfather         Cancer    Cancer Sister         Breast then Ovarian Cancer    Stroke Maternal Grandmother         Survived and live 20 more yrs       Social History:  Social History     Socioeconomic History    Marital status:      Spouse name: Not on file    Number of children: Not on file    Years of education: Not on file     Highest education level: Not on file   Occupational History    Not on file   Tobacco Use    Smoking status: Former     Current packs/day: 0.00     Average packs/day: 1 pack/day for 35.0 years (35.0 ttl pk-yrs)     Types: Cigarettes, Pipe, Cigars     Start date: 1965     Quit date: 2000     Years since quittin.0    Smokeless tobacco: Never    Tobacco comments:     20yrs cigarettes, 9 yrs pipe, 6 years cigars [place] golf [place]   Vaping Use    Vaping status: Never Used   Substance and Sexual Activity    Alcohol use: Yes     Alcohol/week: 3.0 standard drinks of alcohol     Types: 3 Standard drinks or equivalent per week     Comment: a Couple of Beers with Mx, Slovak and Pizza    Drug use: No    Sexual activity: Not on file   Other Topics Concern    Caffeine Concern No    Exercise No    Seat Belt No    Special Diet No    Stress Concern No    Weight Concern No   Social History Narrative    Occupation: retired former pathologist/ genetist.     Where Employed: [employer].     Job Duration (yrs): years 30    Education: phd in science    Hobbies/ Interests: likes golf and runs a golf    Pets: none, grand-dog: bulldog.    Lives with: Erika, son in law, and grandson lives with him.     Lives at: home    Marital Status: , ab  in     Years : 55    Number of Children: 2 children, 3 grandchild    Gnosticist/ Zoroastrianism: Buddhism Moravian.     Other Social History Comments: none         DPOAHC: Daughter, Ashley and then kassy :         no long term ventilators.         Quality, better than quantity.  Full code:  No long term feeding tubes.      Social Drivers of Health     Financial Resource Strain: Patient Declined (2024)    Received from [facility], [facility]    Overall Financial Resource Strain (CARDIA)     Difficulty of Paying Living Expenses: Patient declined   Food Insecurity: Patient Declined  (1/18/2024)    [facility]    Hunger Vital Sign     Worried About Running Out of Food in the Last Year: Patient declined     Ran Out of Food in the Last Year: Patient declined   Transportation Needs: Patient Declined (1/18/2024)    [facility]    PRAPARE - Transportation     Lack of Transportation (Medical): Patient declined     Lack of Transportation (Non-Medical): Patient declined   Physical Activity: Sufficiently Active (1/18/2024)    [facility]    Exercise Vital Sign     Days of Exercise per Week: 4 days     Minutes of Exercise per Session: 40 min   Stress: Patient Declined (1/18/2024)    Received from [facility], [facility] - Occupational Stress Questionnaire     Feeling of Stress : Patient declined   Social Connections: Patient Declined (1/18/2024)    [facility]    Social Connection and Isolation Panel [NHANES]     Frequency of Communication with Friends and Family: Patient declined     Frequency of Social Gatherings with Friends and Family: Patient declined     Attends Mandaen Services: Patient declined     Active Member of Clubs or Organizations: Patient declined     Attends Club or Organization Meetings: Patient declined     Marital Status: Patient declined   Housing Stability: Unknown (1/18/2024)    [facility]    Housing Stability Vital Sign     Unable to Pay for Housing in the Last Year: Patient declined     Number of  Places Lived in the Last Year: 1     Unstable Housing in the Last Year: No       Medications:    Current Outpatient Medications:     clotrimazole-betamethasone 1-0.05 % External Cream, Apply 1 Application topically 2 (two) times daily. Apply to affected area for 10-14 days, then stop. Shower/clean area daily.  If disorder recurs in the future, please restart medication, Disp: 45 g, Rfl: 5    XTANDI 40 MG Oral Tab, TAKE 4 TABLETS (160 MG) BY MOUTH DAILY., Disp: 120 tablet, Rfl: 11    silodosin 8 MG Oral Cap, Take 1 capsule (8 mg total) by mouth daily., Disp: 90 capsule, Rfl: 5    allopurinol 300 MG Oral Tab, Take 1 tablet (300 mg total) by mouth daily., Disp: 90 tablet, Rfl: 0    atorvastatin 20 MG Oral Tab, Take 1 tablet (20 mg total) by mouth every evening., Disp: 90 tablet, Rfl: 0    cloNIDine 0.1 MG Oral Tab, Take 1 tablet (0.1 mg total) by mouth 2 (two) times daily., Disp: 270 tablet, Rfl: 0    cyanocobalamin 1000 MCG Oral Tab, Take 1 tablet (1,000 mcg total) by mouth daily., Disp: , Rfl:     docusate sodium 100 MG Oral Cap, Take 1 capsule (100 mg total) by mouth 2 (two) times daily as needed., Disp: , Rfl:     Polyethylene Glycol 3350 17 GM/SCOOP Oral Powder, Take 17 g by mouth daily as needed., Disp: , Rfl:     Vitamin D3 2000 units Oral Cap, Take 3 capsules (6,000 Units total) by mouth daily. 2000u three times a day, Disp: , Rfl:     Coenzyme Q10 (CO Q 10) 100 MG Oral Cap, Take 1 capsule by mouth in the morning and 1 capsule before bedtime., Disp: , Rfl:     Omega-3 Fatty Acids (OMEGA-3 FISH OIL) 300 MG Oral Cap, Take 1 capsule by mouth 2 (two) times daily., Disp: , Rfl:     Review of Systems:  A comprehensive 14 point review of systems was completed.  Pertinent positives and negatives noted in the HPI.    Performance Status: ECOG 0 - Fully active, able to carry on all predisease activities without restrictions.    Physical Examination:  General: Patient is alert and oriented x 3, not in acute  distress.  Vital Signs: Height: --  Weight: 122.5 kg (270 lb) (01/20 1341)  BSA (Calculated - sq m): --  Pulse: 84 (01/20 1341)  BP: 141/90 (01/20 1341)  Temp: 98 °F (36.7 °C) (01/20 1341)  Do Not Use - Resp Rate: --  SpO2: 97 % (01/20 1341)  HEENT: Anicteric, conjunctivae and sclerae clear, no oropharyngeal lesion/thrush, mucous membranes are moist   Chest: Clear to auscultation. Respirations unlabored.   Heart: Regular rate and rhythm.   Abdomen: Soft, non-distended, non-tender with present bowel sounds.  Extremities: No edema.  Neurological: Grossly intact.   Lymphatics: There is no palpable lymphadenopathy throughout in the cervical or supraclavicular regions.   Skin: warm, dry, no erythema or rash   Psych/Depression: mood and affect are appropriate.     Labs:     Recent Results (from the past 72 hours)   CBC W/DIFF [E]    Collection Time: 01/20/25  2:57 PM   Result Value Ref Range    WBC 7.0 4.0 - 11.0 x10(3) uL    RBC 4.36 3.80 - 5.80 x10(6)uL    HGB 13.5 13.0 - 17.5 g/dL    HCT 40.2 39.0 - 53.0 %    .0 150.0 - 450.0 10(3)uL    MCV 92.2 80.0 - 100.0 fL    MCH 31.0 26.0 - 34.0 pg    MCHC 33.6 31.0 - 37.0 g/dL    RDW 14.3 %    Neutrophil Absolute Prelim 4.82 1.50 - 7.70 x10 (3) uL    Neutrophil Absolute 4.82 1.50 - 7.70 x10(3) uL    Lymphocyte Absolute 1.06 1.00 - 4.00 x10(3) uL    Monocyte Absolute 0.71 0.10 - 1.00 x10(3) uL    Eosinophil Absolute 0.37 0.00 - 0.70 x10(3) uL    Basophil Absolute 0.03 0.00 - 0.20 x10(3) uL    Immature Granulocyte Absolute 0.02 0.00 - 1.00 x10(3) uL    Neutrophil % 68.8 %    Lymphocyte % 15.1 %    Monocyte % 10.1 %    Eosinophil % 5.3 %    Basophil % 0.4 %    Immature Granulocyte % 0.3 %   COMP METABOLIC PANEL [E]    Collection Time: 01/20/25  2:57 PM   Result Value Ref Range    Glucose 93 70 - 99 mg/dL    Sodium 141 136 - 145 mmol/L    Potassium 3.8 3.5 - 5.1 mmol/L    Chloride 104 98 - 112 mmol/L    CO2 31.0 21.0 - 32.0 mmol/L    Anion Gap 6 0 - 18 mmol/L    BUN 12 9 - 23  mg/dL    Creatinine 0.86 0.70 - 1.30 mg/dL    Calcium, Total 10.0 8.7 - 10.6 mg/dL    Calculated Osmolality 291 275 - 295 mOsm/kg    eGFR-Cr 88 >=60 mL/min/1.73m2    AST 17 <34 U/L    ALT 19 10 - 49 U/L    Alkaline Phosphatase 99 45 - 117 U/L    Bilirubin, Total 0.5 0.2 - 1.1 mg/dL    Total Protein 7.2 5.7 - 8.2 g/dL    Albumin 4.7 3.2 - 4.8 g/dL    Globulin  2.5 2.0 - 3.5 g/dL    A/G Ratio 1.9 1.0 - 2.0    Patient Fasting for CMP? No    PSA - DIAGNOSTIC [E]    Collection Time: 01/20/25  2:57 PM   Result Value Ref Range    Total PSA  0.04 <=4.00 ng/mL       Impression/Plan    Recurrent prostate cancer: initially diagnosed in 2015, reportedly gleasons 3+4=7, low volume disease. Underwent high dose brachytherapy completed in 7/2015. Reportedly PSA began to rise in 2018 and Axumin PET revealed disease in pelvic nodes with no distant metastases. Underwent salvage EBRT to pelvis nodes. PSA again philipp in 2021, PSMA PET showed uptake in prostate, no disease elsewhere. Biopsy-- Oracio's 4+5=9 disease in right mid gland, right base and left base. Started enzalutamide in 9/2022. Received one dose of triptorelin 22.5mg in 9/2022, continue to hold per Dr. Nettles.     Planned Follow Up: 3 months labs; 6 months follow up with Dr. Nettles, labs    The 21st Century Cures Act makes medical notes like these available to patients in the interest of transparency. Please be advised this is a medical document. Medical documents are intended to carry relevant information, facts as evident, and the clinical opinion of the practitioner. The medical note is intended as peer to peer communication and may appear blunt or direct. It is written in medical language and may contain abbreviations or verbiage that are unfamiliar.     Electronically Signed by:    Dyan Longoria, CIERRA, APRN, NP-C, AOCNP  Nurse Practitioner  [facility]         [1]   Allergies  Allergen Reactions    Adhesive Tape OTHER (SEE COMMENTS)     Other reaction(s):  Blisters  Other reaction(s): Blisters    Aloysia Triphylla OTHER (SEE COMMENTS) and SWELLING     Lemon-verbena    Lemon-grass from tea patient gets mouth tingling/numbing    Cymbopogon TONGUE SWELLING, ANAPHYLAXIS and OTHER (SEE COMMENTS)    Mastisol Adhesive OTHER (SEE COMMENTS) and UNKNOWN     blisters    Terazosin OTHER (SEE COMMENTS) and SWELLING    Terconazole SWELLING     Nasal congestion, dizziness, edema    Terazosin Hcl      Other reaction(s): nasal congestion, dizzines,  edema    Other OTHER (SEE COMMENTS)     blister    Latex OTHER (SEE COMMENTS)     blister    Sulfa Antibiotics ITCHING and UNKNOWN     As a child

## 2025-01-20 NOTE — PROGRESS NOTES
Pt here for follow up.   Pt is taking xtandi daily.   No new complaints.      Outpatient Oncology Care Plan  Problem list:  knowledge deficit    Problems related to:    disease/disease progression    Interventions:  provided general teaching    Expected outcomes:  understands plan of care    Progress towards outcome:  making progress    Education Record    Learner:  Patient  Barriers / Limitations:  None  Method:  Brief focused  Outcome:  Shows understanding  Comments:

## 2025-03-10 DIAGNOSIS — I71.21 ANEURYSM OF ASCENDING AORTA WITHOUT RUPTURE (CMD): Primary | ICD-10-CM

## 2025-03-24 ENCOUNTER — HOSPITAL ENCOUNTER (OUTPATIENT)
Dept: CT IMAGING | Age: 80
Discharge: HOME | End: 2025-03-24
Attending: THORACIC SURGERY (CARDIOTHORACIC VASCULAR SURGERY)

## 2025-03-24 DIAGNOSIS — I71.21 ANEURYSM OF ASCENDING AORTA WITHOUT RUPTURE (CMD): ICD-10-CM

## 2025-03-24 PROCEDURE — 71250 CT THORAX DX C-: CPT

## 2025-04-15 ENCOUNTER — TELEPHONE (OUTPATIENT)
Age: 80
End: 2025-04-15

## 2025-04-15 DIAGNOSIS — C61 PROSTATE CANCER (HCC): ICD-10-CM

## 2025-04-15 DIAGNOSIS — C61 RECURRENT PROSTATE CANCER (HCC): Primary | ICD-10-CM

## 2025-04-28 ENCOUNTER — NURSE ONLY (OUTPATIENT)
Age: 80
End: 2025-04-28
Attending: INTERNAL MEDICINE
Payer: MEDICARE

## 2025-04-28 DIAGNOSIS — C61 RECURRENT PROSTATE CANCER (HCC): ICD-10-CM

## 2025-04-28 DIAGNOSIS — C61 PROSTATE CANCER (HCC): ICD-10-CM

## 2025-04-28 LAB
ALBUMIN SERPL-MCNC: 5.1 G/DL (ref 3.2–4.8)
ALBUMIN/GLOB SERPL: 2 {RATIO} (ref 1–2)
ALP LIVER SERPL-CCNC: 101 U/L (ref 45–117)
ALT SERPL-CCNC: 22 U/L (ref 10–49)
ANION GAP SERPL CALC-SCNC: 7 MMOL/L (ref 0–18)
AST SERPL-CCNC: 20 U/L (ref ?–34)
BASOPHILS # BLD AUTO: 0.04 X10(3) UL (ref 0–0.2)
BASOPHILS NFR BLD AUTO: 0.6 %
BILIRUB SERPL-MCNC: 0.7 MG/DL (ref 0.2–1.1)
BUN BLD-MCNC: 15 MG/DL (ref 9–23)
CALCIUM BLD-MCNC: 10.7 MG/DL (ref 8.7–10.6)
CHLORIDE SERPL-SCNC: 104 MMOL/L (ref 98–112)
CO2 SERPL-SCNC: 30 MMOL/L (ref 21–32)
CREAT BLD-MCNC: 0.97 MG/DL (ref 0.7–1.3)
EGFRCR SERPLBLD CKD-EPI 2021: 79 ML/MIN/1.73M2 (ref 60–?)
EOSINOPHIL # BLD AUTO: 0.48 X10(3) UL (ref 0–0.7)
EOSINOPHIL NFR BLD AUTO: 7.3 %
ERYTHROCYTE [DISTWIDTH] IN BLOOD BY AUTOMATED COUNT: 14.3 %
GLOBULIN PLAS-MCNC: 2.5 G/DL (ref 2–3.5)
GLUCOSE BLD-MCNC: 105 MG/DL (ref 70–99)
HCT VFR BLD AUTO: 41.4 % (ref 39–53)
HGB BLD-MCNC: 13.8 G/DL (ref 13–17.5)
IMM GRANULOCYTES # BLD AUTO: 0.01 X10(3) UL (ref 0–1)
IMM GRANULOCYTES NFR BLD: 0.2 %
LYMPHOCYTES # BLD AUTO: 1.26 X10(3) UL (ref 1–4)
LYMPHOCYTES NFR BLD AUTO: 19.1 %
MCH RBC QN AUTO: 30.4 PG (ref 26–34)
MCHC RBC AUTO-ENTMCNC: 33.3 G/DL (ref 31–37)
MCV RBC AUTO: 91.2 FL (ref 80–100)
MONOCYTES # BLD AUTO: 0.58 X10(3) UL (ref 0.1–1)
MONOCYTES NFR BLD AUTO: 8.8 %
NEUTROPHILS # BLD AUTO: 4.22 X10 (3) UL (ref 1.5–7.7)
NEUTROPHILS # BLD AUTO: 4.22 X10(3) UL (ref 1.5–7.7)
NEUTROPHILS NFR BLD AUTO: 64 %
OSMOLALITY SERPL CALC.SUM OF ELEC: 293 MOSM/KG (ref 275–295)
PLATELET # BLD AUTO: 226 10(3)UL (ref 150–450)
POTASSIUM SERPL-SCNC: 3.9 MMOL/L (ref 3.5–5.1)
PROT SERPL-MCNC: 7.6 G/DL (ref 5.7–8.2)
PSA SERPL-MCNC: 0.05 NG/ML (ref ?–4)
RBC # BLD AUTO: 4.54 X10(6)UL (ref 3.8–5.8)
SODIUM SERPL-SCNC: 141 MMOL/L (ref 136–145)
WBC # BLD AUTO: 6.6 X10(3) UL (ref 4–11)

## 2025-06-24 ENCOUNTER — TELEPHONE (OUTPATIENT)
Age: 80
End: 2025-06-24

## 2025-06-24 DIAGNOSIS — C61 PROSTATE CANCER (HCC): ICD-10-CM

## 2025-06-24 DIAGNOSIS — C61 RECURRENT PROSTATE CANCER (HCC): Primary | ICD-10-CM

## 2025-06-24 NOTE — TELEPHONE ENCOUNTER
Pt called he would like to speak with a nurse regarding lab orders and if the Dr want him to have labs        Please give the pt a call back he want to speak with a nurse      Thank you

## 2025-07-04 NOTE — PROGRESS NOTES
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    Following for:  1. 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 Urinary retention following biopsy on 7/18/22 which resolved following PFT in 2023  - Cysto 11/8/22: moderate BPH with moderate FOSTER. Moderate bladder trabeculation with small bladder diverticula noted  - UDS 11/8/22: normal sensation and good detrusor contraction  - was on flomax, now rapaflo  - failed VT x 3 and on CIC but urination is improving with PFT  - developed retention + BENNETT s/p biopsy in 2022  3. Intermittent gross hematuria with catheterization  4. H/o overflow incontinence despite CIC  - was > 600 mL out with caths  5. H/o kidney stones  - passed one ~ 1990    PCP - Mock   Onc - Tho  Prior Urologist - [place] (Aug 2022), [place]    LOV 1/13/2025    Presents for check-up.    Lost his wife in 2022.  He feels well. Appetite and energy are good. Golfs regularly.   He is taking rapaflo and extandi.     He last saw Dr Goldstein and long-term ADT recommended with CIC with re-eval at f/u. Prior to PFT was performing CIC 6 x per day with 300-600 mL out.  He completed PFT in [place] in Jan 2023, went down to BID CIC 12/28/22 and stopped CIC completely in Feb 2023.  Voids at least 5-6 time during the day. No interim CIC.  Constipation has resolved with miralax and  colace BID per PCP.    No flank pain, hematuria, dysuria.    AUA SS is 14/35 with 4 w, u; 3 n, f. Was 13/35 with 4 n, u; 2 w, f; 1 I. Was 18/35 with 5 w, f, ESTHELA; 2 n; 1 i. Mostly happy with LUTS.  Incontinence: occasionally dribbling. Using 1 PPD and damp. Not doing PFT exercises right now but not bothered. May go back to PFT in [place] later.  Penoscrotal LOV: no abnormalities  DESEAN LOV: ~ 40 g, somewhat firm, non-tender    UA is negative. Has shown large blood in the past.  PVR is 89 mL. Was 52, 22, 226 mL prior visits and last cath was 6:30 am this morning. Was 226, 322 mL prior checks    Gross hematuria: yes with catheterizations  Tobacco hx: 35 pack years, quit 2000   Fam h/o  malignancy: none    Poor potency. Has not had erections in years. Wants to try 20-30 mg cialis prn. Declines trimix teaching.    Drinks ~ 2.5 L water, rare coffee, soda with medium yellow urine.    Prior PSAs:  - 0.08 7/14/25  - 0.05 4/28/25  - 0.04 1/20/25  - 0.05 12/13/24  - < 0.01 9/18/23  - 0.01 4/24/23  - 0.06 12/19/22  - 0.30 10/17/22  - 6.72 9/19/22  - 5.38 9/6/22  - 07/18/2022 Uronav Bx: Oracio 5+4, 4+5  - 04/22/2022 MRI prostate: 37.4 cc, PI-RADS 4 at left posterior periph zone  - 1.34 4/14/2022   - 11/18/2021 PSMA PET-CT: uptake in prostate  - 1.07 11/01/21  - 0.43 4/21   - 0.19 10/20   - 0.09 5/20   - 0.07 10/19  - 0.04 7/19  - 0.02 4/19  - 0.01 1/19  - 0.03 10/18  - 07/02/2018 - 08/28/2018 salvage EBRT to pelvic nodes (prostate not re-treated)  - 04/11/2018 PSA 4.5 Lupron 45 mg  - 03/06/2018 MRI prostate: (-)  - 01/2018 Auxumin PET-CT: (+) left ext iliac nodes  - 5.01 1/18/18  - 3.99 10/17  - 3.01 7/17  - 1.80 1/17  - 1.00 7/16  - 07/09/2015, 07/23/2015 HDR brachytherapy, 27 Gy in 2 fractions  - PSA 5.0 2015    CTU 6/2/23: no hydro, simple b/l renal cysts, small liver cysts, diverticulosis, b/l adrenal adenomas    CTU 11/14/22: Report states left hydro with possible narrowing in proximal to mid ureter. I see good caliber  ureter down to level of bladder so this could be reflux.     UDS 11/8/22: first sensation 65 mL, first desire 111 mL, strong desire 251 mL, max bladder capacity of 310 mL. No leakage with valsalva. The patient was not able to void with max detrusor pressure of 45 cm H2O and 400 mL PVR.    He would prefer to continue rapaflo, stopped CIC, continue PFT exercises. Continue to drink > 40-60 oz water per day for UTI prevention. Continue ADT with Xtandi per Dr Nettles.  F/u with me in 6 mo with PVR. Consider TURP later if he retains again but doing great now following PFT.    Prior note:  We discussed that sensation appears normal and he has good detrusor contractions. He may benefit from palliative TURP as this may decrease or eliminate need for CIC. He already has pelvic floor weakness with incontinence so I would encourage him to complete PFT first. Discussed that incontinence may worsen following TURP.    Prior note:  We discussed options for urinary symptoms. Ideally he should be cathing with values < 500 mL so he will increase frequency of CIC.     We discussed Kegel exercises for incontinence. I provided and reviewed educational materials for this. We also discussed PFT for both bladder and bowel dysfunction and he is interested in doing this.    PSA seems to have responded favorably to ADT/abiraterone and he will continue this with Dr Nettles.    For retention and gross hematuria I would recommend CTU, UDS, office cysto. Discussed rationale for these tests and he is agreeable.    HISTORY:  Past Medical History:    Allergic rhinitis    Hay Fever    Cancer (HCC)    Prostate Cancer HDBracytherapy    Essential hypertension    Hyperlipidemia    Under Medical Control    Sleep apnea    Use CPAP      Past Surgical History:   Procedure Laterality Date    Colonoscopy  Since 1995    As needed    Hernia surgery  2007    Umbilical repair    Other      R knee scope    Other surgical history      basal cell carcinoma  9/15/2017 & 2016    Skin surgery  2017    Basal Cell - MOHS    Tonsillectomy      Vasectomy        Family History   Problem Relation Age of Onset    Cancer Father         Lung Cancer    Diabetes Father     Cancer Paternal Grandfather         Cancer    Cancer Sister         Breast then Ovarian Cancer    Stroke Maternal Grandmother         Survived and live 20 more yrs      Social History:   Social History     Socioeconomic History    Marital status:    Tobacco Use    Smoking status: Former     Current packs/day: 0.00     Average packs/day: 1 pack/day for 35.0 years (35.0 ttl pk-yrs)     Types: Cigarettes, Pipe, Cigars     Start date: 1965     Quit date: 2000     Years since quittin.5    Smokeless tobacco: Never    Tobacco comments:     20yrs cigarettes, 9 yrs pipe, 6 years cigars [place] golf [place]   Vaping Use    Vaping status: Never Used   Substance and Sexual Activity    Alcohol use: Yes     Alcohol/week: 3.0 standard drinks of alcohol     Types: 3 Standard drinks or equivalent per week     Comment: a Couple of Beers with Mx, Dejuan and Pizza    Drug use: No   Other Topics Concern    Caffeine Concern No    Exercise No    Seat Belt No    Special Diet No    Stress Concern No    Weight Concern No   Social History Narrative    Occupation: retired former pathologist/ genetist.     Where Employed: [employer].     Job Duration (yrs): years 30    Education: phd in science    Hobbies/ Interests: likes golf and runs a golf    Pets: none, grand-dog: bulldog.    Lives with: Erika, son in law, and grandson lives with him.     Lives at: home    Marital Status: , ab  in     Years : 55    Number of Children: 2 children, 3 grandchild    [employer]/ [employer]: [employer].     Other Social History Comments: none         DPOAHC: Daughter, Ashley and then kassy :         no long term ventilators.         Quality, better than quantity.  Full code:  No long  term feeding tubes.      Social Drivers of Health     Food Insecurity: Patient Declined (2/5/2025)    Received from [facility]    Hunger Vital Sign     Worried About Running Out of Food in the Last Year: Patient declined     Ran Out of Food in the Last Year: Patient declined   Transportation Needs: Patient Declined (2/5/2025)    Received from [facility]    PRAPARE - Transportation     Lack of Transportation (Medical): Patient declined     Lack of Transportation (Non-Medical): Patient declined   Stress: Patient Declined (2/5/2025)    Received from [facility] - Occupational Stress Questionnaire     Feeling of Stress : Patient declined   Housing Stability: Patient Declined (2/5/2025)    Received from [facility]    Housing Stability Vital Sign     Unable to Pay for Housing in the Last Year: Patient declined     Number of Times Moved in the Last Year: 0     Homeless in the Last Year: Patient declined        Medications (Active prior to today's visit):  Current Outpatient Medications   Medication Sig Dispense Refill    clotrimazole-betamethasone 1-0.05 % External Cream Apply 1 Application topically 2 (two) times daily. Apply to affected area for 10-14 days, then stop. Shower/clean area daily.  If disorder recurs in the future, please restart medication 45 g 5    XTANDI 40 MG Oral Tab TAKE 4 TABLETS (160 MG) BY MOUTH DAILY. 120 tablet 11    silodosin 8 MG Oral Cap Take 1 capsule (8 mg total) by mouth daily. 90 capsule 5    allopurinol 300 MG Oral Tab Take 1 tablet (300 mg total) by mouth daily. 90 tablet 0    atorvastatin 20 MG Oral Tab Take 1 tablet (20 mg total) by mouth every evening. 90 tablet 0    cloNIDine 0.1 MG Oral Tab Take 1 tablet (0.1 mg total) by mouth 2 (two) times daily. 270 tablet 0    cyanocobalamin 1000 MCG Oral Tab Take 1  tablet (1,000 mcg total) by mouth daily.      docusate sodium 100 MG Oral Cap Take 1 capsule (100 mg total) by mouth 2 (two) times daily as needed.      Polyethylene Glycol 3350 17 GM/SCOOP Oral Powder Take 17 g by mouth daily as needed.      Vitamin D3 2000 units Oral Cap Take 3 capsules (6,000 Units total) by mouth daily. 2000u three times a day      Coenzyme Q10 (CO Q 10) 100 MG Oral Cap Take 1 capsule by mouth in the morning and 1 capsule before bedtime.      Omega-3 Fatty Acids (OMEGA-3 FISH OIL) 300 MG Oral Cap Take 1 capsule by mouth 2 (two) times daily.         Allergies:  Allergies   Allergen Reactions    Adhesive Tape OTHER (SEE COMMENTS)     Other reaction(s): Blisters  Other reaction(s): Blisters    Aloysia Triphylla OTHER (SEE COMMENTS) and SWELLING     Lemon-verbena    Lemon-grass from tea patient gets mouth tingling/numbing    Cymbopogon TONGUE SWELLING, ANAPHYLAXIS and OTHER (SEE COMMENTS)    Mastisol Adhesive OTHER (SEE COMMENTS) and UNKNOWN     blisters    Terazosin OTHER (SEE COMMENTS) and SWELLING    Terconazole SWELLING     Nasal congestion, dizziness, edema    Terazosin Hcl      Other reaction(s): nasal congestion, dizzines,  edema    Other OTHER (SEE COMMENTS)     blister    Latex OTHER (SEE COMMENTS)     blister    Sulfa Antibiotics ITCHING and UNKNOWN     As a child         ROS:     A comprehensive 10 point review of systems was completed.  Pertinent positives and negatives noted in the the HPI.    PHYSICAL EXAM:     GENERAL APPEARANCE: well, developed, well nourished, in no acute distress  NEUROLOGIC: nonfocal, alert and oriented  HEAD: normocephalic, atraumatic  EYES: sclera non-icteric  EARS: hearing intact  ORAL CAVITY: mucosa moist  NECK/THYROID: no obvious goiter or masses  LUNGS: nonlabored breathing  ABDOMEN: soft, no obvious masses or tenderness  SKIN: no obvious rashes    : as noted above    ASSESSMENT/PLAN:   There are no diagnoses linked to this encounter.      - as noted  above.    Thanks again for this consult.    lCement Minaya MD, FACS  Urologist  [facility]  Office: [phone]

## 2025-07-14 ENCOUNTER — OFFICE VISIT (OUTPATIENT)
Dept: SURGERY | Facility: CLINIC | Age: 80
End: 2025-07-14
Payer: MEDICARE

## 2025-07-14 ENCOUNTER — NURSE ONLY (OUTPATIENT)
Age: 80
End: 2025-07-14
Attending: INTERNAL MEDICINE
Payer: MEDICARE

## 2025-07-14 DIAGNOSIS — N52.9 ERECTILE DYSFUNCTION, UNSPECIFIED ERECTILE DYSFUNCTION TYPE: ICD-10-CM

## 2025-07-14 DIAGNOSIS — C61 PROSTATE CANCER (HCC): ICD-10-CM

## 2025-07-14 DIAGNOSIS — K59.00 CONSTIPATION, UNSPECIFIED CONSTIPATION TYPE: ICD-10-CM

## 2025-07-14 DIAGNOSIS — R33.9 URINARY RETENTION: Primary | ICD-10-CM

## 2025-07-14 DIAGNOSIS — C61 RECURRENT PROSTATE CANCER (HCC): ICD-10-CM

## 2025-07-14 DIAGNOSIS — N39.490 OVERFLOW INCONTINENCE: ICD-10-CM

## 2025-07-14 DIAGNOSIS — R31.0 GROSS HEMATURIA: ICD-10-CM

## 2025-07-14 DIAGNOSIS — N13.30 HYDRONEPHROSIS OF LEFT KIDNEY: ICD-10-CM

## 2025-07-14 DIAGNOSIS — R39.12 WEAK URINARY STREAM: ICD-10-CM

## 2025-07-14 LAB
ALBUMIN SERPL-MCNC: 4.5 G/DL (ref 3.2–4.8)
ALBUMIN/GLOB SERPL: 1.7 {RATIO} (ref 1–2)
ALP LIVER SERPL-CCNC: 94 U/L (ref 45–117)
ALT SERPL-CCNC: 18 U/L (ref 10–49)
ANION GAP SERPL CALC-SCNC: 7 MMOL/L (ref 0–18)
APPEARANCE: CLEAR
AST SERPL-CCNC: 16 U/L (ref ?–34)
BASOPHILS # BLD AUTO: 0.04 X10(3) UL (ref 0–0.2)
BASOPHILS NFR BLD AUTO: 0.6 %
BILIRUB SERPL-MCNC: 0.5 MG/DL (ref 0.2–1.1)
BILIRUBIN: NEGATIVE
BUN BLD-MCNC: 15 MG/DL (ref 9–23)
CALCIUM BLD-MCNC: 9.8 MG/DL (ref 8.7–10.6)
CHLORIDE SERPL-SCNC: 103 MMOL/L (ref 98–112)
CO2 SERPL-SCNC: 32 MMOL/L (ref 21–32)
CREAT BLD-MCNC: 0.96 MG/DL (ref 0.7–1.3)
EGFRCR SERPLBLD CKD-EPI 2021: 80 ML/MIN/1.73M2 (ref 60–?)
EOSINOPHIL # BLD AUTO: 0.42 X10(3) UL (ref 0–0.7)
EOSINOPHIL NFR BLD AUTO: 6.6 %
ERYTHROCYTE [DISTWIDTH] IN BLOOD BY AUTOMATED COUNT: 13.9 %
GLOBULIN PLAS-MCNC: 2.6 G/DL (ref 2–3.5)
GLUCOSE (URINE DIPSTICK): NEGATIVE MG/DL
GLUCOSE BLD-MCNC: 101 MG/DL (ref 70–99)
HCT VFR BLD AUTO: 37.6 % (ref 39–53)
HGB BLD-MCNC: 12.7 G/DL (ref 13–17.5)
IMM GRANULOCYTES # BLD AUTO: 0.02 X10(3) UL (ref 0–1)
IMM GRANULOCYTES NFR BLD: 0.3 %
KETONES (URINE DIPSTICK): NEGATIVE MG/DL
LEUKOCYTES: NEGATIVE
LYMPHOCYTES # BLD AUTO: 1.2 X10(3) UL (ref 1–4)
LYMPHOCYTES NFR BLD AUTO: 18.8 %
MCH RBC QN AUTO: 31.2 PG (ref 26–34)
MCHC RBC AUTO-ENTMCNC: 33.8 G/DL (ref 31–37)
MCV RBC AUTO: 92.4 FL (ref 80–100)
MONOCYTES # BLD AUTO: 0.54 X10(3) UL (ref 0.1–1)
MONOCYTES NFR BLD AUTO: 8.5 %
MULTISTIX LOT#: NORMAL NUMERIC
NEUTROPHILS # BLD AUTO: 4.16 X10 (3) UL (ref 1.5–7.7)
NEUTROPHILS # BLD AUTO: 4.16 X10(3) UL (ref 1.5–7.7)
NEUTROPHILS NFR BLD AUTO: 65.2 %
NITRITE, URINE: NEGATIVE
OCCULT BLOOD: NEGATIVE
OSMOLALITY SERPL CALC.SUM OF ELEC: 295 MOSM/KG (ref 275–295)
PH, URINE: 6 (ref 4.5–8)
PLATELET # BLD AUTO: 214 10(3)UL (ref 150–450)
POTASSIUM SERPL-SCNC: 4.2 MMOL/L (ref 3.5–5.1)
PROT SERPL-MCNC: 7.1 G/DL (ref 5.7–8.2)
PROTEIN (URINE DIPSTICK): NEGATIVE MG/DL
PSA SERPL-MCNC: 0.08 NG/ML (ref ?–4)
RBC # BLD AUTO: 4.07 X10(6)UL (ref 3.8–5.8)
SODIUM SERPL-SCNC: 142 MMOL/L (ref 136–145)
SPECIFIC GRAVITY: 1.01 (ref 1–1.03)
URINE-COLOR: YELLOW
UROBILINOGEN,SEMI-QN: 0.2 MG/DL (ref 0–1.9)
WBC # BLD AUTO: 6.4 X10(3) UL (ref 4–11)

## 2025-07-14 PROCEDURE — G2211 COMPLEX E/M VISIT ADD ON: HCPCS | Performed by: UROLOGY

## 2025-07-14 PROCEDURE — 99214 OFFICE O/P EST MOD 30 MIN: CPT | Performed by: UROLOGY

## 2025-07-14 PROCEDURE — 81003 URINALYSIS AUTO W/O SCOPE: CPT | Performed by: UROLOGY

## 2025-07-14 RX ORDER — OLMESARTAN MEDOXOMIL, AMLODIPINE AND HYDROCHLOROTHIAZIDE TABLET 40/10/25 MG 40; 10; 25 MG/1; MG/1; MG/1
TABLET ORAL
COMMUNITY
Start: 2006-01-01

## 2025-07-14 RX ORDER — IRON POLYSACCHARIDE COMPLEX 150 MG
150 CAPSULE ORAL 2 TIMES DAILY
COMMUNITY

## 2025-07-14 RX ORDER — TADALAFIL 20 MG/1
20 TABLET ORAL
Qty: 30 TABLET | Refills: 5 | Status: SHIPPED | OUTPATIENT
Start: 2025-07-14

## 2025-07-14 RX ORDER — SILODOSIN 8 MG/1
8 CAPSULE ORAL DAILY
Qty: 90 CAPSULE | Refills: 5 | Status: SHIPPED | OUTPATIENT
Start: 2025-07-14

## 2025-07-21 ENCOUNTER — OFFICE VISIT (OUTPATIENT)
Age: 80
End: 2025-07-21
Attending: INTERNAL MEDICINE
Payer: MEDICARE

## 2025-07-21 VITALS
HEART RATE: 69 BPM | DIASTOLIC BLOOD PRESSURE: 79 MMHG | WEIGHT: 268.81 LBS | OXYGEN SATURATION: 97 % | RESPIRATION RATE: 18 BRPM | TEMPERATURE: 98 F | HEIGHT: 71.5 IN | BODY MASS INDEX: 36.81 KG/M2 | SYSTOLIC BLOOD PRESSURE: 127 MMHG

## 2025-07-21 DIAGNOSIS — Z51.11 ENCOUNTER FOR CHEMOTHERAPY MANAGEMENT: ICD-10-CM

## 2025-07-21 DIAGNOSIS — C61 RECURRENT PROSTATE CANCER (HCC): Primary | ICD-10-CM

## 2025-07-21 NOTE — PROGRESS NOTES
Cancer Center Progress Note  Patient Name: Brenden Damon   YOB: 1945   Medical Record Number: [id number]   [facility]: [id number]   Attending Physician: Jose Luis Nettles M.D.       Date of Visit: 7/21/2025      Chief Complaint:  No chief complaint on file.       Oncologic History:  Brenden Damon is a 79 year old male by Dr. Zepeda for evaluation of prostate cancer. He was initially diagnosed with prostate cancer in 2015, reportedly gleasons 3+4=7, low volume disease. He underwent High dose brachytherapy x2 doses completed in July, 2015. He reports that his PSA was found to be rising in 2018, and Auxamin PET revealed disease in his pelvic nodes, but no distant metastases. He reports that the prostate was negative at the time and he underwent salvage EBRT to the pelvic nodes. The prostate was not treated at the time. His PSA dipped to near zero.     When his PSa was found to be rising in late 2021, a PSMA PET was performed (11/18/21) revealing uptake in the prostate, but no evidence of disease elsewhere. MRI confirmed a lesion at the L posterior peripheral zone. He underwent a navigational biopsy. It revealed Gleasons 4+5=9 disease in the R mid gland, R base, and L base.     Unfortunately, that biopsy was complicated by urinary retention requiring an indwelling de paz. Multiple attempts ad alpha blockade and voiding trials have not been successful.    Initial opinions suggested local therapies or prostatectomy, but he sought an opinion at [facility], where Dr Goldstein recommended against such interventions. The high grade status of his disease makes distant micrometastatic disease very likely, and his previous radiation makes surgical intervention more difficult and more likely to result in complications. Medical oncology evaluation was recommended.    Other than his chronic urinary retention, the patient reports that he feels well. No pain. No F/C/NS.     History of Present Illness:  Pt is here for follow up. He is  tolerating Xtandi alone with no side effects.     Performance Status:  ECOG 0    Past Medical History:  Past Medical History:    Allergic rhinitis    Hay Fever    Cancer (HCC)    Prostate Cancer HDBracytherapy    Essential hypertension    Hyperlipidemia    Under Medical Control    Sleep apnea    Use CPAP       Past Surgical History:  Past Surgical History:   Procedure Laterality Date    Colonoscopy  Since     As needed    Hernia surgery      Umbilical repair    Other      R knee scope    Other surgical history      basal cell carcinoma 9/15/2017 & 2016    Skin surgery      Basal Cell - MOHS    Tonsillectomy      Vasectomy         Family History:  Family History   Problem Relation Age of Onset    Cancer Father         Lung Cancer    Diabetes Father     Cancer Paternal Grandfather         Cancer    Cancer Sister         Breast then Ovarian Cancer    Stroke Maternal Grandmother         Survived and live 20 more yrs       Social History:  Social History     Socioeconomic History    Marital status:      Spouse name: Not on file    Number of children: Not on file    Years of education: Not on file    Highest education level: Not on file   Occupational History    Not on file   Tobacco Use    Smoking status: Former     Current packs/day: 0.00     Average packs/day: 1 pack/day for 35.0 years (35.0 ttl pk-yrs)     Types: Cigarettes, Pipe, Cigars     Start date: 1965     Quit date: 2000     Years since quittin.5    Smokeless tobacco: Never    Tobacco comments:     20yrs cigarettes, 9 yrs pipe, 6 years cigars [place]   Vaping Use    Vaping status: Never Used   Substance and Sexual Activity    Alcohol use: Yes     Alcohol/week: 3.0 standard drinks of alcohol     Types: 3 Standard drinks or equivalent per week     Comment: a Couple of Beers with Mx, Guyanese and Pizza    Drug use: No    Sexual activity: Not on file   Other Topics Concern    Caffeine Concern No    Exercise No    Seat  Belt No    Special Diet No    Stress Concern No    Weight Concern No   Social History Narrative    Occupation: retired former pathologist/ genetist.     Where Employed: [employer]     Job Duration (yrs): years 30    Education: phd in science    Hobbies/ Interests: likes golf and runs a golf    Pets: none, grand-dog: bulldog.    Lives with: Erika, son in law, and grandson lives with him.     Lives at: home    Marital Status: , ab  in     Years : 55    Number of Children: 2 children, 3 grandchild    Rastafarian/ Religion: Faith Christian.     Other Social History Comments: none         DPOAHC: Daughter, Ashley and then kassy :         no long term ventilators.         Quality, better than quantity.  Full code:  No long term feeding tubes.      Social Drivers of Health     Food Insecurity: Patient Declined (2025)    Received [facility]    Hunger Vital Sign     Worried About Running Out of Food in the Last Year: Patient declined     Ran Out of Food in the Last Year: Patient declined   Transportation Needs: Patient Declined (2025)    [facility]    PRAPARE - Transportation     Lack of Transportation (Medical): Patient declined     Lack of Transportation (Non-Medical): Patient declined   Housing Stability: Patient Declined (2025)    [facility] from [facility]    Housing Stability Vital Sign     Unable to Pay for Housing in the Last Year: Patient declined     Number of Times Moved in the Last Year: 0     Homeless in the Last Year: Patient declined       Current Medications:    Current Outpatient Medications:     Iron polysacch cmplx 150 MG Oral Cap, Take 1 capsule (150 mg total) by mouth 2 (two) times daily., Disp: , Rfl:     Olmesartan-amLODIPine-HCTZ 40-10-25 MG Oral Tab, , Disp: , Rfl:     BABY ASPIRIN OR, , Disp: , Rfl:     Tadalafil  (CIALIS) 20 MG Oral Tab, Take 1 tablet (20 mg total) by mouth daily as needed for Erectile Dysfunction., Disp: 30 tablet, Rfl: 5    silodosin 8 MG Oral Cap, Take 1 capsule (8 mg total) by mouth daily., Disp: 90 capsule, Rfl: 5    XTANDI 40 MG Oral Tab, TAKE 4 TABLETS (160 MG) BY MOUTH DAILY., Disp: 120 tablet, Rfl: 11    allopurinol 300 MG Oral Tab, Take 1 tablet (300 mg total) by mouth daily., Disp: 90 tablet, Rfl: 0    atorvastatin 20 MG Oral Tab, Take 1 tablet (20 mg total) by mouth every evening., Disp: 90 tablet, Rfl: 0    cloNIDine 0.1 MG Oral Tab, Take 1 tablet (0.1 mg total) by mouth 2 (two) times daily., Disp: 270 tablet, Rfl: 0    cyanocobalamin 1000 MCG Oral Tab, Take 1 tablet (1,000 mcg total) by mouth daily., Disp: , Rfl:     Polyethylene Glycol 3350 17 GM/SCOOP Oral Powder, Take 17 g by mouth daily as needed., Disp: , Rfl:     Vitamin D3 2000 units Oral Cap, Take 3 capsules (6,000 Units total) by mouth daily. 2000u three times a day, Disp: , Rfl:     Coenzyme Q10 (CO Q 10) 100 MG Oral Cap, Take 1 capsule by mouth in the morning and 1 capsule before bedtime., Disp: , Rfl:     Omega-3 Fatty Acids (OMEGA-3 FISH OIL) 300 MG Oral Cap, Take 1 capsule by mouth 2 (two) times daily., Disp: , Rfl:     Allergies:  Allergies   Allergen Reactions    Adhesive Tape OTHER (SEE COMMENTS)     Other reaction(s): Blisters  Other reaction(s): Blisters    Aloysia Triphylla OTHER (SEE COMMENTS) and SWELLING     Lemon-verbena    Lemon-grass from tea patient gets mouth tingling/numbing    Cymbopogon TONGUE SWELLING, ANAPHYLAXIS and OTHER (SEE COMMENTS)    Mastisol Adhesive OTHER (SEE COMMENTS) and UNKNOWN     blisters    Terazosin OTHER (SEE COMMENTS) and SWELLING    Terconazole SWELLING     Nasal congestion, dizziness, edema    Terazosin Hcl      Other reaction(s): nasal congestion, dizzines,  edema    Other OTHER (SEE COMMENTS)     blister    Latex OTHER (SEE COMMENTS)     blister    Sulfa Antibiotics ITCHING and UNKNOWN      As a child        Review of Systems:    Constitutional No fevers, chills, night sweats, excessive fatigue or weight loss.   Eyes No significant visual difficulties. No diplopia. No yellowing of the eyes.   Hematologic/Lymphatic No easy bruising or bleeding.  No any tender or palpable lymph nodes.   Respiratory No dyspnea, Pleuritic chest pain, cough or hemoptysis.   Cardiovascular No anginal chest pain, palpitations or orthopnea.   Gastrointestinal No nausea, vomiting, diarrhea, GI bleeding, or constipation.   Genitorurinary (M) No hematuria, dysuria, or incontinence. No abnormal bleeding.   Integumentary No rashes or yellowing of the skin   Neurologic No headache, blurred vision, and no areas of focal weakness. Normal gait.   Psychiatric No insomnia, depression, renetta or mood swings.         Vital Signs:  /79 (BP Location: Left arm, Patient Position: Sitting, Cuff Size: large)   Pulse 69   Temp 98 °F (36.7 °C) (Temporal)   Resp 18   Ht 1.816 m (5' 11.5\")   Wt 121.9 kg (268 lb 12.8 oz)   SpO2 97%   BMI 36.97 kg/m²     Physical Examination:    Constitutional Normal - Mood and affect appropriate. Appears close to chronological age. Well nourished. Well developed.   Eyes Normal - Conjunctivae and sclerae are clear and without icterus. Pupils are reactive and equal.   Hematologic/Lymphatic Normal - No petechiae or purpura.  No tender or palpable lymph nodes in the cervical, supraclavicular, axillary or inguinal area.   Respiratory Normal - Lungs are clear to auscultation, no rhonchi or wheezing.   Cardiovascular Normal - Regular rate and rhythm, no murmurs, gallops or rubs.   Abdomen Normal - Non-tender, non-distended, no masses, ascites or hepatosplenomegaly.    Extremities Normal - No visible deformities, no cyanosis, clubbing or edema.    Integumentary Normal - No rashes, No Jaundice   Neurologic Normal - No sensory or motor deficits, normal cerebellar function, normal gait, cranial nerves intact.    Psychiatric Normal - Alert and oriented times three. Coherent speech. Verbalizes understanding of our discussions today.         Labs   Latest Reference Range & Units 07/14/25 13:36   Sodium 136 - 145 mmol/L 142   Potassium 3.5 - 5.1 mmol/L 4.2   Chloride 98 - 112 mmol/L 103   Carbon Dioxide, Total 21.0 - 32.0 mmol/L 32.0   BUN 9 - 23 mg/dL 15   CREATININE 0.70 - 1.30 mg/dL 0.96   CALCIUM 8.7 - 10.6 mg/dL 9.8   EGFR >=60 mL/min/1.73m2 80   ANION GAP 0 - 18 mmol/L 7   CALCULATED OSMOLALITY 275 - 295 mOsm/kg 295   ALKALINE PHOSPHATASE 45 - 117 U/L 94   AST (SGOT) <34 U/L 16   ALT (SGPT) 10 - 49 U/L 18   Total Bilirubin 0.2 - 1.1 mg/dL 0.5   Globulin 2.0 - 3.5 g/dL 2.6      Latest Reference Range & Units 07/14/25 13:36   A/G Ratio 1.0 - 2.0  1.7   PROTEIN, TOTAL 5.7 - 8.2 g/dL 7.1   Albumin 3.2 - 4.8 g/dL 4.5      Latest Reference Range & Units 07/14/25 13:36   WBC 4.0 - 11.0 x10(3) uL 6.4   Hemoglobin 13.0 - 17.5 g/dL 12.7 (L)   Hematocrit 39.0 - 53.0 % 37.6 (L)   Platelet Count 150.0 - 450.0 10(3)uL 214.0   RBC 3.80 - 5.80 x10(6)uL 4.07   MCH 26.0 - 34.0 pg 31.2   MCHC 31.0 - 37.0 g/dL 33.8   MCV 80.0 - 100.0 fL 92.4   RDW % 13.9   Prelim Neutrophil Abs 1.50 - 7.70 x10 (3) uL 4.16   Neutrophils Absolute 1.50 - 7.70 x10(3) uL 4.16   Lymphocytes Absolute 1.00 - 4.00 x10(3) uL 1.20   Monocytes Absolute 0.10 - 1.00 x10(3) uL 0.54   Eosinophils Absolute 0.00 - 0.70 x10(3) uL 0.42   Basophils Absolute 0.00 - 0.20 x10(3) uL 0.04   Immature Granulocyte Absolute 0.00 - 1.00 x10(3) uL 0.02   Neutrophils % % 65.2   Lymphocytes % % 18.8   Monocytes % % 8.5   Eosinophils % % 6.6   Basophils % % 0.6   (L): Data is abnormally low   Latest Reference Range & Units 01/20/25 14:57 04/28/25 10:56 07/14/25 13:36   TOTAL PSA <=4.00 ng/mL 0.04 0.05 0.08     Radiology:      Pathology:  7/18/22   Pathologic Diagnosis    [facility]   A. Prostate, right posterior medial, MRI fusion biopsy with ultrasound:  - Prostatic  adenocarcinoma, Oracio grade 4+5 = 9/10, grade group 5, involving 1 of 1 cores, 20% of examined tissue  - HMWK 34BE12 and cytokeratin AE1/AE3 immunohistochemical stains support the diagnosis  - See comment     B. Prostate, right posterior lateral, MRI fusion biopsy with ultrasound:  - Benign stromal tissue     C. Prostate, right base, MRI fusion biopsy with ultrasound:  - Prostatic adenocarcinoma, Oracio grade 5+4 = 9/10, grade group 5,  involving 1 of 1 cores, 80% of examined tissue  - HMWK 34BE12 and cytokeratin AE1/AE3 immunohistochemical stains support the diagnosis  - See comment     D. Prostate, right anterior medial, MRI fusion biopsy with ultrasound:  - Benign stromal tissue     E.  Prostate, right anterior lateral, MRI fusion biopsy with ultrasound:  - Benign fibromuscular tissue     F. Prostate, left posterior medial, MRI fusion biopsy with ultrasound:  - Atypical small acinar proliferation (ASAP)     G. Prostate, left posterior lateral, MRI fusion biopsy with ultrasound:  - Benign fibromuscular tissue     H. Prostate, left base, MRI fusion biopsy with ultrasound:  - Prostatic adenocarcinoma, Oracio grade 4+5 = 9/10, grade group 5,  involving 1 of 2 cores, 60% of examined tissue  - [id number] [id number] immunohistochemical stain supports the diagnosis     I. Prostate, left anterior medial, MRI fusion biopsy with ultrasound:  - Benign stromal tissue     J. Prostate, left anterior lateral, MRI fusion biopsy with ultrasound:  - Benign prostatic glands and stroma  - HMWK 34BE12 immunohistochemical stain supports the diagnosis     K. Prostate, region of interest left base, MRI fusion biopsy with ultrasound:  - Benign prostatic glands and stroma  - HMWK [id number] immunohistochemical stain supports the diagnosis             Impression and Plan:  Recurrent Prostate cancer: PSMA PET demonstrates diffuse isaiah involvement. Surgical resection would be complicated and is not likely to result in a cure. I recommended that we  initiate ADT and Xtandi. He has not received an injection in a year, but has continued the Xtandi. His PSA is slowly rising, but the doubling time is <6 months. At this point, we will proceed with intermittent antiandrogen therapy and continue the Xtandi.     Planned Follow Up:  3 months        Electronically Signed by:    Jose Luis Nettles M.D.  [facility]

## 2025-07-21 NOTE — PROGRESS NOTES
Pt here for follow up.   He is taking xtandi.   He is going to PT for low back and sciatic pain.      Outpatient Oncology Care Plan  Problem list:  knowledge deficit    Problems related to:    disease/disease progression    Interventions:  provided general teaching    Expected outcomes:  understands plan of care    Progress towards outcome:  making progress    Education Record    Learner:  Patient  Barriers / Limitations:  None  Method:  Brief focused  Outcome:  Shows understanding  Comments:

## (undated) DIAGNOSIS — E78.00 PURE HYPERCHOLESTEROLEMIA: ICD-10-CM

## (undated) DEVICE — CONTAINER SPEC 4OZ STOOL SCR ON LID LEAK RST STRL PP LF

## (undated) DEVICE — ULTRSOUND URONAV 10917-T2

## (undated) DEVICE — GEL US AQSN 100 .25L PH 6.5-6.95 TRNMS AQ DSPNSR CLR BLUE

## (undated) DEVICE — SOLUTION IRR 500ML 0.9% NACL PLASTIC POUR BTL ISTNC N-PYRG

## (undated) DEVICE — DRESSING TRANS 4.75X4IN ADH HPOAL WTPRF TEGADERM PU STD STRL

## (undated) DEVICE — GUIDE NDL PRCS PT TRANSPERINEAL

## (undated) DEVICE — 2% CHLORHEXIDINE SKIN PREP ORANGE 26ML

## (undated) DEVICE — DRAPE ANTIMICROBIAL INCS 13X13IN SURG IOBN2 STRL

## (undated) DEVICE — URONAV MRI FUSION SYSTEM 10880-T2

## (undated) DEVICE — DRESSING WND 8X3IN ABS PAD NADH TELFA POLY CTN STRL LF

## (undated) DEVICE — COVER TRNDCR US BIPLANE 8848 EA4015

## (undated) DEVICE — INSTRUMENT BIOPSY 20CM 18GA BARD MNPTY ANG 22MM ERG HNDL

## (undated) NOTE — Clinical Note
I hope you can hep him get to the point of not needing to self catherize. He has had outlet obstruction, after biopsy. Has recurrent prostrate cancer, and doing well with [facility].   Thanks,

## (undated) NOTE — LETTER
02/17/20          [address] [address]     Dear  Yeimy Card ,      We have attempted to schedule you for follow up for your sleep disorders. Please call our office at [phone] to schedule this visit.   We will be

## (undated) NOTE — LETTER
06/03/20         [employer]      Dear Topher Foy,    [address] records indicate that you have outstanding lab work and or testing that was ordered for you and has not yet been completed:  Orders Placed This Encounter

## (undated) NOTE — LETTER
10/01/18        Eldon Tam  [address] [address]      Dear Helen Mederos,    [facility] records indicate that you have outstanding lab work and or testing that was ordered for you and has not yet been completed:  Orders Placed This Encounter

## (undated) NOTE — LETTER
02/09/21        [address] [facility] [address]      Dear Yeimy Card,    Our records indicate that you have outstanding lab work and or testing that was ordered for you and has not yet been completed:  Orders Placed This Encounter